# Patient Record
Sex: FEMALE | Race: WHITE | Employment: OTHER | URBAN - METROPOLITAN AREA
[De-identification: names, ages, dates, MRNs, and addresses within clinical notes are randomized per-mention and may not be internally consistent; named-entity substitution may affect disease eponyms.]

---

## 2017-02-08 PROBLEM — R19.7 DIARRHEA: Status: ACTIVE | Noted: 2017-02-08

## 2017-02-08 PROBLEM — K21.9 CHRONIC GERD: Status: ACTIVE | Noted: 2017-02-08

## 2017-06-21 PROBLEM — R19.7 DIARRHEA: Status: ACTIVE | Noted: 2017-06-21

## 2018-03-11 ENCOUNTER — OFFICE VISIT (OUTPATIENT)
Dept: FAMILY MEDICINE CLINIC | Age: 58
End: 2018-03-11
Payer: COMMERCIAL

## 2018-03-11 VITALS
HEART RATE: 63 BPM | TEMPERATURE: 98.1 F | DIASTOLIC BLOOD PRESSURE: 82 MMHG | SYSTOLIC BLOOD PRESSURE: 144 MMHG | BODY MASS INDEX: 26.22 KG/M2 | HEIGHT: 63 IN | WEIGHT: 148 LBS

## 2018-03-11 DIAGNOSIS — J32.9 SINOBRONCHITIS: Primary | ICD-10-CM

## 2018-03-11 DIAGNOSIS — J40 SINOBRONCHITIS: Primary | ICD-10-CM

## 2018-03-11 PROCEDURE — 99213 OFFICE O/P EST LOW 20 MIN: CPT | Performed by: NURSE PRACTITIONER

## 2018-03-11 RX ORDER — DOXYCYCLINE HYCLATE 100 MG
100 TABLET ORAL 2 TIMES DAILY
Qty: 20 TABLET | Refills: 0 | Status: SHIPPED | OUTPATIENT
Start: 2018-03-11 | End: 2018-03-21

## 2018-03-11 RX ORDER — PREDNISONE 20 MG/1
20 TABLET ORAL 2 TIMES DAILY
Qty: 14 TABLET | Refills: 0 | Status: SHIPPED | OUTPATIENT
Start: 2018-03-11 | End: 2018-03-18

## 2018-03-11 RX ORDER — FLUTICASONE PROPIONATE 50 MCG
1 SPRAY, SUSPENSION (ML) NASAL DAILY
Qty: 1 BOTTLE | Refills: 3 | Status: SHIPPED | OUTPATIENT
Start: 2018-03-11 | End: 2019-04-05

## 2018-04-10 RX ORDER — ATORVASTATIN CALCIUM 10 MG/1
10 TABLET, FILM COATED ORAL DAILY
Qty: 30 TABLET | Refills: 12 | Status: SHIPPED | OUTPATIENT
Start: 2018-04-10 | End: 2019-04-05 | Stop reason: SDUPTHER

## 2018-04-10 RX ORDER — HYDROCHLOROTHIAZIDE 12.5 MG/1
CAPSULE, GELATIN COATED ORAL
Qty: 30 CAPSULE | Refills: 11 | Status: SHIPPED | OUTPATIENT
Start: 2018-04-10 | End: 2019-04-05 | Stop reason: SDUPTHER

## 2018-04-17 ENCOUNTER — HOSPITAL ENCOUNTER (OUTPATIENT)
Dept: GENERAL RADIOLOGY | Age: 58
Discharge: HOME | End: 2018-04-19
Payer: COMMERCIAL

## 2018-04-17 ENCOUNTER — OFFICE VISIT (OUTPATIENT)
Dept: FAMILY MEDICINE CLINIC | Age: 58
End: 2018-04-17
Payer: COMMERCIAL

## 2018-04-17 ENCOUNTER — HOSPITAL ENCOUNTER (OUTPATIENT)
Age: 58
Discharge: HOME | End: 2018-04-19
Payer: COMMERCIAL

## 2018-04-17 VITALS
OXYGEN SATURATION: 97 % | TEMPERATURE: 98.5 F | HEIGHT: 63 IN | WEIGHT: 150 LBS | HEART RATE: 64 BPM | BODY MASS INDEX: 26.58 KG/M2 | RESPIRATION RATE: 16 BRPM | DIASTOLIC BLOOD PRESSURE: 80 MMHG | SYSTOLIC BLOOD PRESSURE: 124 MMHG

## 2018-04-17 DIAGNOSIS — J20.9 ACUTE BRONCHITIS, UNSPECIFIED ORGANISM: Primary | ICD-10-CM

## 2018-04-17 PROCEDURE — 4004F PT TOBACCO SCREEN RCVD TLK: CPT | Performed by: PHYSICIAN ASSISTANT

## 2018-04-17 PROCEDURE — 3014F SCREEN MAMMO DOC REV: CPT | Performed by: PHYSICIAN ASSISTANT

## 2018-04-17 PROCEDURE — G8419 CALC BMI OUT NRM PARAM NOF/U: HCPCS | Performed by: PHYSICIAN ASSISTANT

## 2018-04-17 PROCEDURE — G8427 DOCREV CUR MEDS BY ELIG CLIN: HCPCS | Performed by: PHYSICIAN ASSISTANT

## 2018-04-17 PROCEDURE — 71046 X-RAY EXAM CHEST 2 VIEWS: CPT

## 2018-04-17 PROCEDURE — 3017F COLORECTAL CA SCREEN DOC REV: CPT | Performed by: PHYSICIAN ASSISTANT

## 2018-04-17 PROCEDURE — 94640 AIRWAY INHALATION TREATMENT: CPT | Performed by: PHYSICIAN ASSISTANT

## 2018-04-17 PROCEDURE — 99213 OFFICE O/P EST LOW 20 MIN: CPT | Performed by: PHYSICIAN ASSISTANT

## 2018-04-17 RX ORDER — DOXYCYCLINE HYCLATE 100 MG
100 TABLET ORAL 2 TIMES DAILY
Qty: 20 TABLET | Refills: 0 | Status: SHIPPED | OUTPATIENT
Start: 2018-04-17 | End: 2018-04-27

## 2018-04-17 RX ORDER — PREDNISONE 10 MG/1
TABLET ORAL
Qty: 20 TABLET | Refills: 0 | Status: SHIPPED | OUTPATIENT
Start: 2018-04-17 | End: 2018-04-27

## 2018-04-17 RX ORDER — DEXAMETHASONE SODIUM PHOSPHATE 100 MG/10ML
10 INJECTION INTRAMUSCULAR; INTRAVENOUS ONCE
Status: COMPLETED | OUTPATIENT
Start: 2018-04-17 | End: 2018-04-17

## 2018-04-17 RX ORDER — ALBUTEROL SULFATE 90 MCG
2 HFA AEROSOL WITH ADAPTER (GRAM) INHALATION EVERY 6 HOURS PRN
Qty: 1 INHALER | Refills: 0 | Status: SHIPPED | OUTPATIENT
Start: 2018-04-17 | End: 2019-04-05

## 2018-04-17 RX ORDER — IPRATROPIUM BROMIDE AND ALBUTEROL SULFATE 2.5; .5 MG/3ML; MG/3ML
1 SOLUTION RESPIRATORY (INHALATION) ONCE
Status: COMPLETED | OUTPATIENT
Start: 2018-04-17 | End: 2018-04-17

## 2018-04-17 RX ORDER — BROMPHENIRAMINE MALEATE, PSEUDOEPHEDRINE HYDROCHLORIDE, AND DEXTROMETHORPHAN HYDROBROMIDE 2; 30; 10 MG/5ML; MG/5ML; MG/5ML
5 SYRUP ORAL 4 TIMES DAILY PRN
Qty: 100 ML | Refills: 0 | Status: SHIPPED | OUTPATIENT
Start: 2018-04-17 | End: 2019-04-05 | Stop reason: ALTCHOICE

## 2018-04-17 RX ORDER — AZITHROMYCIN 250 MG/1
TABLET, FILM COATED ORAL
Qty: 1 PACKET | Refills: 0 | Status: CANCELLED | OUTPATIENT
Start: 2018-04-17 | End: 2018-04-27

## 2018-04-17 RX ADMIN — DEXAMETHASONE SODIUM PHOSPHATE 10 MG: 100 INJECTION INTRAMUSCULAR; INTRAVENOUS at 17:11

## 2018-04-17 RX ADMIN — IPRATROPIUM BROMIDE AND ALBUTEROL SULFATE 1 AMPULE: 2.5; .5 SOLUTION RESPIRATORY (INHALATION) at 17:12

## 2018-09-04 RX ORDER — OMEPRAZOLE 40 MG/1
CAPSULE, DELAYED RELEASE ORAL
Qty: 30 CAPSULE | Refills: 5 | Status: SHIPPED | OUTPATIENT
Start: 2018-09-04 | End: 2019-03-07 | Stop reason: SDUPTHER

## 2019-02-06 ENCOUNTER — TELEPHONE (OUTPATIENT)
Dept: FAMILY MEDICINE CLINIC | Age: 59
End: 2019-02-06

## 2019-02-07 ENCOUNTER — TELEPHONE (OUTPATIENT)
Dept: ADMINISTRATIVE | Age: 59
End: 2019-02-07

## 2019-03-07 RX ORDER — OMEPRAZOLE 40 MG/1
CAPSULE, DELAYED RELEASE ORAL
Qty: 30 CAPSULE | Refills: 5 | Status: SHIPPED | OUTPATIENT
Start: 2019-03-07 | End: 2019-04-05 | Stop reason: SDUPTHER

## 2019-04-05 ENCOUNTER — OFFICE VISIT (OUTPATIENT)
Dept: FAMILY MEDICINE CLINIC | Age: 59
End: 2019-04-05
Payer: COMMERCIAL

## 2019-04-05 ENCOUNTER — HOSPITAL ENCOUNTER (OUTPATIENT)
Age: 59
Discharge: HOME | End: 2019-04-07
Payer: COMMERCIAL

## 2019-04-05 VITALS
OXYGEN SATURATION: 96 % | HEIGHT: 63 IN | SYSTOLIC BLOOD PRESSURE: 126 MMHG | WEIGHT: 158 LBS | RESPIRATION RATE: 18 BRPM | BODY MASS INDEX: 28 KG/M2 | DIASTOLIC BLOOD PRESSURE: 75 MMHG | HEART RATE: 67 BPM | TEMPERATURE: 99 F

## 2019-04-05 DIAGNOSIS — F33.9 RECURRENT MAJOR DEPRESSIVE DISORDER, REMISSION STATUS UNSPECIFIED (HCC): ICD-10-CM

## 2019-04-05 DIAGNOSIS — Z13.31 POSITIVE DEPRESSION SCREENING: ICD-10-CM

## 2019-04-05 DIAGNOSIS — E78.2 MIXED HYPERLIPIDEMIA: ICD-10-CM

## 2019-04-05 DIAGNOSIS — M25.511 CHRONIC RIGHT SHOULDER PAIN: ICD-10-CM

## 2019-04-05 DIAGNOSIS — I10 ESSENTIAL HYPERTENSION: ICD-10-CM

## 2019-04-05 DIAGNOSIS — G89.29 CHRONIC RIGHT SHOULDER PAIN: ICD-10-CM

## 2019-04-05 DIAGNOSIS — K21.9 CHRONIC GERD: ICD-10-CM

## 2019-04-05 PROCEDURE — G8419 CALC BMI OUT NRM PARAM NOF/U: HCPCS | Performed by: STUDENT IN AN ORGANIZED HEALTH CARE EDUCATION/TRAINING PROGRAM

## 2019-04-05 PROCEDURE — 4004F PT TOBACCO SCREEN RCVD TLK: CPT | Performed by: STUDENT IN AN ORGANIZED HEALTH CARE EDUCATION/TRAINING PROGRAM

## 2019-04-05 PROCEDURE — 3017F COLORECTAL CA SCREEN DOC REV: CPT | Performed by: STUDENT IN AN ORGANIZED HEALTH CARE EDUCATION/TRAINING PROGRAM

## 2019-04-05 PROCEDURE — 36415 COLL VENOUS BLD VENIPUNCTURE: CPT | Performed by: FAMILY MEDICINE

## 2019-04-05 PROCEDURE — G8431 POS CLIN DEPRES SCRN F/U DOC: HCPCS | Performed by: STUDENT IN AN ORGANIZED HEALTH CARE EDUCATION/TRAINING PROGRAM

## 2019-04-05 PROCEDURE — G8427 DOCREV CUR MEDS BY ELIG CLIN: HCPCS | Performed by: STUDENT IN AN ORGANIZED HEALTH CARE EDUCATION/TRAINING PROGRAM

## 2019-04-05 PROCEDURE — 99213 OFFICE O/P EST LOW 20 MIN: CPT | Performed by: STUDENT IN AN ORGANIZED HEALTH CARE EDUCATION/TRAINING PROGRAM

## 2019-04-05 PROCEDURE — 80053 COMPREHEN METABOLIC PANEL: CPT

## 2019-04-05 PROCEDURE — 99212 OFFICE O/P EST SF 10 MIN: CPT | Performed by: STUDENT IN AN ORGANIZED HEALTH CARE EDUCATION/TRAINING PROGRAM

## 2019-04-05 PROCEDURE — 36415 COLL VENOUS BLD VENIPUNCTURE: CPT

## 2019-04-05 RX ORDER — RANITIDINE HCL 75 MG
75 TABLET ORAL 2 TIMES DAILY
Qty: 60 TABLET | Refills: 3 | Status: SHIPPED | OUTPATIENT
Start: 2019-04-05 | End: 2019-09-16 | Stop reason: SDUPTHER

## 2019-04-05 RX ORDER — HYDROCHLOROTHIAZIDE 12.5 MG/1
CAPSULE, GELATIN COATED ORAL
Qty: 30 CAPSULE | Refills: 11 | Status: SHIPPED | OUTPATIENT
Start: 2019-04-05 | End: 2019-09-16 | Stop reason: SDUPTHER

## 2019-04-05 RX ORDER — OMEPRAZOLE 40 MG/1
40 CAPSULE, DELAYED RELEASE ORAL DAILY
Qty: 30 CAPSULE | Refills: 5 | Status: SHIPPED | OUTPATIENT
Start: 2019-04-05 | End: 2019-04-09

## 2019-04-05 RX ORDER — IBUPROFEN 800 MG/1
800 TABLET ORAL EVERY 6 HOURS PRN
Qty: 90 TABLET | Refills: 3 | Status: SHIPPED | OUTPATIENT
Start: 2019-04-05 | End: 2019-09-16 | Stop reason: ALTCHOICE

## 2019-04-05 RX ORDER — ATORVASTATIN CALCIUM 10 MG/1
10 TABLET, FILM COATED ORAL DAILY
Qty: 30 TABLET | Refills: 12 | Status: SHIPPED | OUTPATIENT
Start: 2019-04-05 | End: 2019-09-16 | Stop reason: SDUPTHER

## 2019-04-05 RX ORDER — SERTRALINE HYDROCHLORIDE 100 MG/1
TABLET, FILM COATED ORAL
Qty: 30 TABLET | Refills: 3 | Status: SHIPPED | OUTPATIENT
Start: 2019-04-05 | End: 2019-09-16 | Stop reason: SDUPTHER

## 2019-04-05 ASSESSMENT — PATIENT HEALTH QUESTIONNAIRE - PHQ9
3. TROUBLE FALLING OR STAYING ASLEEP: 0
10. IF YOU CHECKED OFF ANY PROBLEMS, HOW DIFFICULT HAVE THESE PROBLEMS MADE IT FOR YOU TO DO YOUR WORK, TAKE CARE OF THINGS AT HOME, OR GET ALONG WITH OTHER PEOPLE: 2
4. FEELING TIRED OR HAVING LITTLE ENERGY: 3
1. LITTLE INTEREST OR PLEASURE IN DOING THINGS: 3
5. POOR APPETITE OR OVEREATING: 3
SUM OF ALL RESPONSES TO PHQ9 QUESTIONS 1 & 2: 4
9. THOUGHTS THAT YOU WOULD BE BETTER OFF DEAD, OR OF HURTING YOURSELF: 0
6. FEELING BAD ABOUT YOURSELF - OR THAT YOU ARE A FAILURE OR HAVE LET YOURSELF OR YOUR FAMILY DOWN: 0
2. FEELING DOWN, DEPRESSED OR HOPELESS: 1
8. MOVING OR SPEAKING SO SLOWLY THAT OTHER PEOPLE COULD HAVE NOTICED. OR THE OPPOSITE, BEING SO FIGETY OR RESTLESS THAT YOU HAVE BEEN MOVING AROUND A LOT MORE THAN USUAL: 1
SUM OF ALL RESPONSES TO PHQ QUESTIONS 1-9: 12
7. TROUBLE CONCENTRATING ON THINGS, SUCH AS READING THE NEWSPAPER OR WATCHING TELEVISION: 1
SUM OF ALL RESPONSES TO PHQ QUESTIONS 1-9: 12

## 2019-04-05 NOTE — PROGRESS NOTES
Chief Complaint: Chronic right shoulder pain    HPI: Chronic right shoulder pain for many years. Centered right below right scapular spine, seems to come and go. Radiates into neck and shoulder at times. Not taking much for the pain.     Past Medical History:   Diagnosis Date    [facility]coholic ([facility]     Anxiety     Convulsions/seizures ([facility] 9/16/2011    Depression     GERD (gastroesophageal reflux disease)     Headache     HTN (hypertension) 6/16/2015    Mixed hyperlipidemia 2/17/2016    Osteoarthritis     Psychiatric problem     [facility]     methadone       Past Surgical History:   Procedure Laterality Date    BREAST SURGERY  1988    augmentation silicone    ECTOPIC PREGNANCY SURGERY      TUBAL LIGATION  01/01/1993       Family History   Problem Relation Age of Onset    Diabetes Mother     Kidney Disease Mother     Stroke Mother     High Cholesterol Mother     Arthritis Mother     High Blood Pressure Mother     Obesity Mother     Diabetes Father     Arthritis Father     High Blood Pressure Father     High Cholesterol Father     Obesity Father     Other Father     Substance Abuse Son        Social History     Socioeconomic History    Marital status:      Spouse name: Not on file    Number of children: Not on file    Years of education: Not on file    Highest education level: Not on file   Occupational History    Not on file   Social Needs    Financial resource strain: Not on file    Food insecurity:     Worry: Not on file     Inability: Not on file    Transportation needs:     Medical: Not on file     Non-medical: Not on file   Tobacco Use    Smoking status: Current Every Day Smoker     Packs/day: 0.50     Years: 15.00     Pack years: 7.50     Types: Cigarettes    Smokeless tobacco: Never Used   Substance and Sexual Activity    Alcohol use: No     Alcohol/week: 0.0 oz     Comment: recovering alcoholic    Drug use: No     Comment: recovering addict    Sexual activity: Not Currently   Lifestyle    Physical activity:     Days per week: Not on file     Minutes per session: Not on file    Stress: Not on file   Relationships    Social connections:     Talks on phone: Not on file     Gets together: Not on file     Attends Muslim service: Not on file     Active member of club or organization: Not on file     Attends meetings of clubs or organizations: Not on file     Relationship status: Not on file    Intimate partner violence:     Fear of current or ex partner: Not on file     Emotionally abused: Not on file     Physically abused: Not on file     Forced sexual activity: Not on file   Other Topics Concern    Not on file   Social History Narrative    Not on file       Current Outpatient Medications   Medication Sig Dispense Refill    omeprazole (PRILOSEC) 40 MG delayed release capsule TAKE ONE CAPSULE BY MOUTH DAILY WITH BREAKFAST 30 capsule 5    sertraline (ZOLOFT) 100 MG tablet TAKE 1 TABLET BY MOUTH ONCE EVERY DAY 30 tablet 7    atorvastatin (LIPITOR) 10 MG tablet Take 1 tablet by mouth daily 30 tablet 12    hydrochlorothiazide (MICROZIDE) 12.5 MG capsule TAKE ONE CAPSULE BY MOUTH ONCE A  DAY 30 capsule 11    methadone (DOLOPHINE) 10 MG/ML solution Take 80 mg by mouth daily       ibuprofen (ADVIL;MOTRIN) 800 MG tablet Take 1 tablet by mouth every 6 hours as needed for Pain 90 tablet 11     No current facility-administered medications for this visit. Allergies: Patient has no known allergies. Review of Systems   Constitutional: Positive for fever (intermittent, mild). Negative for chills, diaphoresis, fatigue and unexpected weight change. Musculoskeletal: Positive for arthralgias. Negative for back pain, gait problem, joint swelling, myalgias, neck pain and neck stiffness. Skin: Negative for rash.        /75 (Site: Right Upper Arm, Position: Sitting, Cuff Size: Medium Adult)   Pulse 67   Temp 99 °F (37.2 °C) (Oral)   Resp 18   Ht 5' 3\" (1.6 m)   Wt 158 lb (71.7 kg)   LMP 09/08/2011   SpO2 96%   BMI 27.99 kg/m²     Physical Exam   Constitutional: She appears well-developed and well-nourished. No distress. Cardiovascular: Normal rate, regular rhythm, normal heart sounds and intact distal pulses. Exam reveals no gallop and no friction rub. No murmur heard. Pulmonary/Chest: Effort normal and breath sounds normal. No stridor. She has no wheezes. Musculoskeletal:   Point area of tenderness and muscle spasm just below medial portion of right scapular spine. No cervical spinal tenderness. Full range of motion right shoulder. Neer's and Pauline Maddi' tests negative. No pain to palpation of right shoulder joint. Skin: She is not diaphoretic. Assessment and Plan:  Saray Schulz was seen today for shoulder pain. Diagnoses and all orders for this visit:    Chronic right shoulder pain  Appears related to trigger point below right scapular spine. Will trial NSAID therapy and heat to area, may consider trigger point injection in future if needed. -     ibuprofen (ADVIL;MOTRIN) 800 MG tablet; Take 1 tablet by mouth every 6 hours as needed for Pain    Mixed hyperlipidemia  Stable, continue present management. -     atorvastatin (LIPITOR) 10 MG tablet; Take 1 tablet by mouth daily    Essential hypertension  Stable, continue present management. Will get CMP for medication monitoring.  -     hydrochlorothiazide (MICROZIDE) 12.5 MG capsule; TAKE ONE CAPSULE BY MOUTH ONCE A  DAY  -     Comprehensive Metabolic Panel; Future    Positive depression screening  Patient already on therapy, patient believes this is helping tremendously, does not feel need to adjust at this time. Will monitor, repeat depression screen at next visit.   -     sertraline (ZOLOFT) 100 MG tablet; TAKE 1 TABLET BY MOUTH ONCE EVERY DAY  -     Positive Screen for Clinical Depression with a Documented Follow-up Plan     Recurrent major depressive disorder, remission status

## 2019-04-06 LAB
ALBUMIN SERPL-MCNC: 4.1 G/DL (ref 3.5–5.2)
ALP BLD-CCNC: 78 U/L (ref 35–104)
ALT SERPL-CCNC: 18 U/L (ref 0–32)
ANION GAP SERPL CALCULATED.3IONS-SCNC: 13 MMOL/L (ref 7–16)
AST SERPL-CCNC: 20 U/L (ref 0–31)
BILIRUB SERPL-MCNC: <0.2 MG/DL (ref 0–1.2)
BUN BLDV-MCNC: 9 MG/DL (ref 6–20)
CALCIUM SERPL-MCNC: 8.9 MG/DL (ref 8.6–10.2)
CHLORIDE BLD-SCNC: 100 MMOL/L (ref 98–107)
CO2: 28 MMOL/L (ref 22–29)
CREAT SERPL-MCNC: 0.7 MG/DL (ref 0.5–1)
GFR AFRICAN AMERICAN: >60
GFR NON-AFRICAN AMERICAN: >60 ML/MIN/1.73
GLUCOSE BLD-MCNC: 84 MG/DL (ref 74–99)
POTASSIUM SERPL-SCNC: 3.9 MMOL/L (ref 3.5–5)
SODIUM BLD-SCNC: 141 MMOL/L (ref 132–146)
TOTAL PROTEIN: 7.1 G/DL (ref 6.4–8.3)

## 2019-04-09 ENCOUNTER — HOSPITAL ENCOUNTER (EMERGENCY)
Age: 59
Discharge: HOME | End: 2019-04-09
Attending: EMERGENCY MEDICINE
Payer: COMMERCIAL

## 2019-04-09 ENCOUNTER — APPOINTMENT (OUTPATIENT)
Dept: GENERAL RADIOLOGY | Age: 59
End: 2019-04-09
Payer: COMMERCIAL

## 2019-04-09 VITALS
HEART RATE: 67 BPM | DIASTOLIC BLOOD PRESSURE: 66 MMHG | OXYGEN SATURATION: 99 % | WEIGHT: 150 LBS | BODY MASS INDEX: 26.58 KG/M2 | SYSTOLIC BLOOD PRESSURE: 116 MMHG | TEMPERATURE: 98.9 F | RESPIRATION RATE: 16 BRPM | HEIGHT: 63 IN

## 2019-04-09 DIAGNOSIS — R50.9 FUO (FEVER OF UNKNOWN ORIGIN): Primary | ICD-10-CM

## 2019-04-09 LAB
ALBUMIN SERPL-MCNC: 4 G/DL (ref 3.5–5.2)
ALP BLD-CCNC: 77 U/L (ref 35–104)
ALT SERPL-CCNC: 9 U/L (ref 0–32)
ANION GAP SERPL CALCULATED.3IONS-SCNC: 16 MMOL/L (ref 7–16)
AST SERPL-CCNC: 19 U/L (ref 0–31)
BASOPHILS ABSOLUTE: 0.02 E9/L (ref 0–0.2)
BASOPHILS RELATIVE PERCENT: 0.4 % (ref 0–2)
BILIRUB SERPL-MCNC: <0.2 MG/DL (ref 0–1.2)
BILIRUBIN URINE: NEGATIVE
BLOOD, URINE: NEGATIVE
BUN BLDV-MCNC: 6 MG/DL (ref 6–20)
CALCIUM SERPL-MCNC: 8.4 MG/DL (ref 8.6–10.2)
CHLORIDE BLD-SCNC: 100 MMOL/L (ref 98–107)
CLARITY: CLEAR
CO2: 24 MMOL/L (ref 22–29)
COLOR: NORMAL
CREAT SERPL-MCNC: 0.7 MG/DL (ref 0.5–1)
EOSINOPHILS ABSOLUTE: 0.06 E9/L (ref 0.05–0.5)
EOSINOPHILS RELATIVE PERCENT: 1.2 % (ref 0–6)
GFR AFRICAN AMERICAN: >60
GFR NON-AFRICAN AMERICAN: >60 ML/MIN/1.73
GLUCOSE BLD-MCNC: 98 MG/DL (ref 74–99)
GLUCOSE URINE: NEGATIVE MG/DL
HCT VFR BLD CALC: 38.6 % (ref 34–48)
HEMOGLOBIN: 12.8 G/DL (ref 11.5–15.5)
IMMATURE GRANULOCYTES #: 0.04 E9/L
IMMATURE GRANULOCYTES %: 0.8 % (ref 0–5)
KETONES, URINE: NEGATIVE MG/DL
LEUKOCYTE ESTERASE, URINE: NEGATIVE
LYMPHOCYTES ABSOLUTE: 1.4 E9/L (ref 1.5–4)
LYMPHOCYTES RELATIVE PERCENT: 28.1 % (ref 20–42)
MCH RBC QN AUTO: 33.1 PG (ref 26–35)
MCHC RBC AUTO-ENTMCNC: 33.2 % (ref 32–34.5)
MCV RBC AUTO: 99.7 FL (ref 80–99.9)
MONOCYTES ABSOLUTE: 0.55 E9/L (ref 0.1–0.95)
MONOCYTES RELATIVE PERCENT: 11 % (ref 2–12)
NEUTROPHILS ABSOLUTE: 2.92 E9/L (ref 1.8–7.3)
NEUTROPHILS RELATIVE PERCENT: 58.5 % (ref 43–80)
NITRITE, URINE: NEGATIVE
PDW BLD-RTO: 11.9 FL (ref 11.5–15)
PH UA: 6 (ref 5–9)
PLATELET # BLD: 219 E9/L (ref 130–450)
PMV BLD AUTO: 9.5 FL (ref 7–12)
POTASSIUM SERPL-SCNC: 3.3 MMOL/L (ref 3.5–5)
PROTEIN UA: NEGATIVE MG/DL
RBC # BLD: 3.87 E12/L (ref 3.5–5.5)
SEDIMENTATION RATE, ERYTHROCYTE: 46 MM/HR (ref 0–20)
SODIUM BLD-SCNC: 140 MMOL/L (ref 132–146)
SPECIFIC GRAVITY UA: <=1.005 (ref 1–1.03)
TOTAL PROTEIN: 7.2 G/DL (ref 6.4–8.3)
UROBILINOGEN, URINE: 0.2 E.U./DL
WBC # BLD: 5 E9/L (ref 4.5–11.5)

## 2019-04-09 PROCEDURE — 36415 COLL VENOUS BLD VENIPUNCTURE: CPT

## 2019-04-09 PROCEDURE — 80053 COMPREHEN METABOLIC PANEL: CPT

## 2019-04-09 PROCEDURE — 85025 COMPLETE CBC W/AUTO DIFF WBC: CPT

## 2019-04-09 PROCEDURE — 85651 RBC SED RATE NONAUTOMATED: CPT

## 2019-04-09 PROCEDURE — 87088 URINE BACTERIA CULTURE: CPT

## 2019-04-09 PROCEDURE — 86140 C-REACTIVE PROTEIN: CPT

## 2019-04-09 PROCEDURE — 87040 BLOOD CULTURE FOR BACTERIA: CPT

## 2019-04-09 PROCEDURE — 99283 EMERGENCY DEPT VISIT LOW MDM: CPT

## 2019-04-09 PROCEDURE — 81003 URINALYSIS AUTO W/O SCOPE: CPT

## 2019-04-09 PROCEDURE — 71046 X-RAY EXAM CHEST 2 VIEWS: CPT

## 2019-04-09 NOTE — ED PROVIDER NOTES
Patient was taken over from Dr. Sung Grace. Discussed results with the patient. CXR was ordered. Discussed results with the patient. Patient will be discharged home and is to follow up with her PCP    4/9/19, 7:52 PM.    This note is prepared by Paul Rossi, acting as Scribe for Fidel Ross DO. Fidel Ross DO:  The scribe's documentation has been prepared under my direction and personally reviewed by me in its entirety. I confirm that the note above accurately reflects all work, treatment, procedures, and medical decision making performed by me.

## 2019-04-09 NOTE — ED PROVIDER NOTES
HPI:  4/9/19,   Time: 5:26 PM         Celia Rey is a 63 y.o. female presenting to the ED for a persistent fever, beginning over one month ago. The complaint has been intermittent, moderate in severity, and worsened by nothing. The patient has had a chronic smoker's cough but she states it is nonproductive and she denies nausea or vomiting or urinary symptoms. She does have diarrhea but she states it is chronic from her Crohn's disease. She complains of generalized weakness . There has been no headache or rash. She saw her physician 2 days ago and had basic labs drawn which showed no leukocytosis but she is noticed to have chronic macrocytic indices on her red blood cell count. The patient has a history of alcoholism but she denies any history of IV drug abuse and she has not used alcohol for the last 2 years    ROS:   Pertinent positives and negatives are stated within HPI, all other systems reviewed and are negative.  --------------------------------------------- PAST HISTORY ---------------------------------------------  Past Medical History:  has a past medical history of Alcoholic ([facility] Anxiety, [facility] Depression, GERD (gastroesophageal reflux disease), Headache, HTN (hypertension), Mixed hyperlipidemia, Osteoarthritis, Psychiatric problem, and [facility] Past Surgical History:  has a past surgical history that includes Ectopic pregnancy surgery; Breast surgery (1988); and Tubal ligation (01/01/1993). Social History:  reports that she has been smoking cigarettes. She has a 7.50 pack-year smoking history. She has never used smokeless tobacco. She reports that she does not drink alcohol or use drugs.     Family History: family history includes Arthritis in her father and mother; Diabetes in her father and mother; High Blood Pressure in her father and mother; High Cholesterol in her father and mother; Kidney Disease in her mother; Obesity in her father and mother; Other in her father; Stroke in her mother; Substance Abuse in her son. The patients home medications have been reviewed. Allergies: Patient has no known allergies. -------------------------------------------------- RESULTS -------------------------------------------------  All laboratory and radiology results have been personally reviewed by myself   LABS:  No results found for this visit on 19. RADIOLOGY:  Interpreted by Radiologist.  No orders to display       ------------------------- NURSING NOTES AND VITALS REVIEWED ---------------------------   The nursing notes within the ED encounter and vital signs as below have been reviewed. /82   Pulse 80   Temp 98.4 °F (36.9 °C) (Oral)   Resp 16   Ht 5' 3\" (1.6 m)   Wt 150 lb (68 kg)   LMP 2011   SpO2 95%   BMI 26.57 kg/m²   Oxygen Saturation Interpretation: Normal      ---------------------------------------------------PHYSICAL EXAM--------------------------------------      Constitutional/General: Alert and oriented x3, well appearing, non toxic in NAD  Head: NC/AT  Eyes: PERRL, EOMI  Mouth: Oropharynx clear, handling secretions, no trismus  Neck: Supple, full ROM, no meningeal signs  Pulmonary: Lungs clear to auscultation bilaterally, no wheezes, rales, or rhonchi. Not in respiratory distress  Cardiovascular:  Regular rate and rhythm, no murmurs, gallops, or rubs. 2+ distal pulses  Abdomen: Soft, non tender, non distended,   Extremities: Moves all extremities x 4. Warm and well perfused  Skin: warm and dry without rash  Neurologic: GCS 15,  Psych: Normal Affect      ------------------------------ ED COURSE/MEDICAL DECISION MAKING----------------------  Medications - No data to display      Medical Decision Makin blood cultures basic labs will be repeated a chest x-ray urinalysis and urine cultures will be obtained and patient is to follow-up with family practice in 3 days' time.   A C-reactive protein and a sedimentation rate will also be obtained    Counseling: The emergency provider has spoken with the patient and family member patient and daughter and discussed todays results, in addition to providing specific details for the plan of care and counseling regarding the diagnosis and prognosis. Questions are answered at this time and they are agreeable with the plan.      --------------------------------- IMPRESSION AND DISPOSITION ---------------------------------    IMPRESSION  1. Fever, unspecified fever cause    2.  FUO (fever of unknown origin)        DISPOSITION  Disposition: Discharge to home  Patient condition is stable                  Alba Haddad MD  04/12/19 0081

## 2019-04-10 LAB — C-REACTIVE PROTEIN: 5.8 MG/DL (ref 0–0.4)

## 2019-04-12 ENCOUNTER — OFFICE VISIT (OUTPATIENT)
Dept: FAMILY MEDICINE CLINIC | Age: 59
End: 2019-04-12
Payer: COMMERCIAL

## 2019-04-12 VITALS
DIASTOLIC BLOOD PRESSURE: 73 MMHG | OXYGEN SATURATION: 96 % | TEMPERATURE: 98.4 F | HEART RATE: 65 BPM | SYSTOLIC BLOOD PRESSURE: 124 MMHG | BODY MASS INDEX: 27.64 KG/M2 | HEIGHT: 63 IN | WEIGHT: 156 LBS

## 2019-04-12 DIAGNOSIS — R50.9 INTERMITTENT FEVER OF UNKNOWN ORIGIN: ICD-10-CM

## 2019-04-12 DIAGNOSIS — M25.50 ARTHRALGIA OF MULTIPLE SITES, BILATERAL: ICD-10-CM

## 2019-04-12 DIAGNOSIS — R79.82 ELEVATED C-REACTIVE PROTEIN (CRP): ICD-10-CM

## 2019-04-12 DIAGNOSIS — R70.0 ELEVATED ERYTHROCYTE SEDIMENTATION RATE: ICD-10-CM

## 2019-04-12 LAB — URINE CULTURE, ROUTINE: NORMAL

## 2019-04-12 PROCEDURE — 4004F PT TOBACCO SCREEN RCVD TLK: CPT | Performed by: STUDENT IN AN ORGANIZED HEALTH CARE EDUCATION/TRAINING PROGRAM

## 2019-04-12 PROCEDURE — G8427 DOCREV CUR MEDS BY ELIG CLIN: HCPCS | Performed by: STUDENT IN AN ORGANIZED HEALTH CARE EDUCATION/TRAINING PROGRAM

## 2019-04-12 PROCEDURE — 3017F COLORECTAL CA SCREEN DOC REV: CPT | Performed by: STUDENT IN AN ORGANIZED HEALTH CARE EDUCATION/TRAINING PROGRAM

## 2019-04-12 PROCEDURE — 99212 OFFICE O/P EST SF 10 MIN: CPT | Performed by: STUDENT IN AN ORGANIZED HEALTH CARE EDUCATION/TRAINING PROGRAM

## 2019-04-12 PROCEDURE — 36415 COLL VENOUS BLD VENIPUNCTURE: CPT | Performed by: FAMILY MEDICINE

## 2019-04-12 PROCEDURE — G8419 CALC BMI OUT NRM PARAM NOF/U: HCPCS | Performed by: STUDENT IN AN ORGANIZED HEALTH CARE EDUCATION/TRAINING PROGRAM

## 2019-04-12 PROCEDURE — 99213 OFFICE O/P EST LOW 20 MIN: CPT | Performed by: STUDENT IN AN ORGANIZED HEALTH CARE EDUCATION/TRAINING PROGRAM

## 2019-04-12 ASSESSMENT — ENCOUNTER SYMPTOMS: BACK PAIN: 0

## 2019-04-12 NOTE — PROGRESS NOTES
Attending Physician Statement    S:   Chief Complaint   Patient presents with    Follow-Up from Hospital      Patient is a 63year old female here for ER follow up . Was in the ER for intermittent fevers of 102 for 5- 10 min. Patient has had this on and off for months. CBC was checked, urine , chest xray , blood cultures. All were negative. Patient did had elevated CRP and ESR. Joint pain is worse in the morning. Lasts for a while but gets better throughout the day. Primarily in knee, elbows , shoulders. Denies any hip pain. Last fever was Fri/Saturday. No rashes. O: Blood pressure 124/73, pulse 65, temperature 98.4 °F (36.9 °C), temperature source Oral, height 5' 3\" (1.6 m), weight 156 lb (70.8 kg), last menstrual period 09/08/2011, SpO2 96 %, not currently breastfeeding. Exam:   Heart - RRR   Lungs - clear   Abd- BS+, soft, NT/ND  A: intermittent fever , joint pain   P:  No signs of infection. Concern for possible vasculitis, autoimmune disorder such as lupus or rheumatoid arthritis , or less likely hematologic malignancy    Check labs - ADONIS, RF, peripheral smear, SPEP  May consider CT chest ,abdomen, pelvis    Check mammogram    Follow-up as ordered    Attending Attestation   I have discussed the case, including pertinent history and exam findings with the resident. I agree with the documented assessment and plan.

## 2019-04-12 NOTE — PROGRESS NOTES
Chief Complaint: ED Follow up fevers    HPI: Recurrent fevers of unknown origin, ED follow up. Patient states that she has periodic fevers up to 102, lasting 5 to 10 minutes at a time, which have been intermittently present for several months. Lab work in ED was negative, but patient did have elevated CRP and ESR. Continues to have joint pains as described at her recent office visit, worse in mornings, goes away as day goes on, predominantly in knees, elbows, and shoulders without hip, ankle, or hand pain. Denies rashes.     Past Medical History:   Diagnosis Date    [facility] ([facility]     Anxiety     Convulsions[facility]seizures ([facility] 9/16/2011    Depression     GERD (gastroesophageal reflux disease)     Headache     HTN (hypertension) 6/16/2015    Mixed hyperlipidemia 2/17/2016    Osteoarthritis     Psychiatric problem     [facility]     [facility]adone       Past Surgical History:   Procedure Laterality Date    BREAST SURGERY  1988    augmentation silicone    ECTOPIC PREGNANCY SURGERY      TUBAL LIGATION  01/01/1993       Family History   Problem Relation Age of Onset    Diabetes Mother     Kidney Disease Mother     Stroke Mother     High Cholesterol Mother     Arthritis Mother     High Blood Pressure Mother     Obesity Mother     Diabetes Father     Arthritis Father     High Blood Pressure Father     High Cholesterol Father     Obesity Father     Other Father     Substance Abuse Son        Social History     Socioeconomic History    Marital status:      Spouse name: Not on file    Number of children: Not on file    Years of education: Not on file    Highest education level: Not on file   Occupational History    Not on file   Social Needs    Financial resource strain: Not on file    Food insecurity:     Worry: Not on file     Inability: Not on file    Transportation needs:     Medical: Not on file     Non-medical: Not on file   Tobacco Use    Smoking status: Current Every Day Smoker     Packs/day: 0.50     Years: 15.00     Pack years: 7.50     Types: Cigarettes    Smokeless tobacco: Never Used   Substance and Sexual Activity    Alcohol use: No     Alcohol/week: 0.0 oz     Comment: recovering alcoholic    Drug use: No     Comment: recovering addict    Sexual activity: Not Currently   Lifestyle    Physical activity:     Days per week: Not on file     Minutes per session: Not on file    Stress: Not on file   Relationships    Social connections:     Talks on phone: Not on file     Gets together: Not on file     Attends Voodoo service: Not on file     Active member of club or organization: Not on file     Attends meetings of clubs or organizations: Not on file     Relationship status: Not on file    Intimate partner violence:     Fear of current or ex partner: Not on file     Emotionally abused: Not on file     Physically abused: Not on file     Forced sexual activity: Not on file   Other Topics Concern    Not on file   Social History Narrative    Not on file       Current Outpatient Medications   Medication Sig Dispense Refill    sertraline (ZOLOFT) 100 MG tablet TAKE 1 TABLET BY MOUTH ONCE EVERY DAY 30 tablet 3    atorvastatin (LIPITOR) 10 MG tablet Take 1 tablet by mouth daily 30 tablet 12    hydrochlorothiazide (MICROZIDE) 12.5 MG capsule TAKE ONE CAPSULE BY MOUTH ONCE A  DAY 30 capsule 11    ibuprofen (ADVIL;MOTRIN) 800 MG tablet Take 1 tablet by mouth every 6 hours as needed for Pain 90 tablet 3    ranitidine (ZANTAC) 75 MG tablet Take 1 tablet by mouth 2 times daily 60 tablet 3    methadone (DOLOPHINE) 10 MG/ML solution Take 65 mg by mouth daily. No current facility-administered medications for this visit. Allergies: Patient has no known allergies. Review of Systems   Constitutional: Positive for diaphoresis and fever. Negative for activity change, appetite change, chills, fatigue and unexpected weight change.    Respiratory: Negative for cough and shortness of breath. Cardiovascular: Negative for chest pain and palpitations. Gastrointestinal: Negative for constipation, diarrhea, nausea and vomiting. Endocrine: Negative for cold intolerance, heat intolerance, polydipsia and polyuria. Musculoskeletal: Positive for arthralgias. Negative for joint swelling and myalgias. Skin: Negative for color change and rash. Neurological: Negative for dizziness, syncope and light-headedness. Hematological: Negative for adenopathy. Psychiatric/Behavioral: The patient is not nervous/anxious. /73 (Site: Right Upper Arm, Position: Sitting, Cuff Size: Medium Adult)   Pulse 65   Temp 98.4 °F (36.9 °C) (Oral)   Ht 5' 3\" (1.6 m)   Wt 156 lb (70.8 kg)   LMP 09/08/2011   SpO2 96%   BMI 27.63 kg/m²     Physical Exam   Constitutional: She appears well-developed and well-nourished. No distress. Cardiovascular: Normal rate, regular rhythm, normal heart sounds and intact distal pulses. Exam reveals no gallop and no friction rub. No murmur heard. Pulmonary/Chest: Effort normal and breath sounds normal. She has no wheezes. She has no rales. Abdominal: Soft. Bowel sounds are normal. She exhibits no distension and no mass. There is no tenderness. Musculoskeletal: She exhibits no edema. Skin: She is not diaphoretic. Assessment and Plan:  Jhoan Coe was seen today for follow-up from hospital.    Diagnoses and all orders for this visit:    Intermittent fever of unknown origin  -     RHEUMATOID FACTOR; Future  -     ADONIS; Future  -     PROTEIN ELECTROPHORESIS, SERUM; Future  -     PERIPHERAL BLOOD SMEAR, PATH REVIEW; Future    Arthralgia of multiple sites, bilateral  Elevated erythrocyte sedimentation rate  Elevated C-reactive protein (CRP)  Concern for rheumatologic etiology versus vasculitis versus malignancy. Will check ADONIS and RF, will obtain peripheral smear, and will obtain serum protein electrophoresis.   May consider CT of chest and abdomen if blood work negative.  -     RHEUMATOID FACTOR; Future  -     ADONIS; Future  -     PROTEIN ELECTROPHORESIS, SERUM; Future  -     PERIPHERAL BLOOD SMEAR, PATH REVIEW; Future      Call or go to ED immediately if symptoms worsen or persist.  Return in about 2 weeks (around 4/26/2019) for Exam following recurrent fevers and arthralgias. , or sooner if necessary. All questions answered. Shashank Troy MD  Family Medicine Resident, PGY-3

## 2019-04-14 ENCOUNTER — HOSPITAL ENCOUNTER (OUTPATIENT)
Age: 59
Discharge: HOME | End: 2019-04-14
Payer: COMMERCIAL

## 2019-04-14 DIAGNOSIS — R70.0 ELEVATED ERYTHROCYTE SEDIMENTATION RATE: ICD-10-CM

## 2019-04-14 DIAGNOSIS — M25.50 ARTHRALGIA OF MULTIPLE SITES, BILATERAL: ICD-10-CM

## 2019-04-14 DIAGNOSIS — R50.9 INTERMITTENT FEVER OF UNKNOWN ORIGIN: ICD-10-CM

## 2019-04-14 DIAGNOSIS — R79.82 ELEVATED C-REACTIVE PROTEIN (CRP): ICD-10-CM

## 2019-04-14 LAB — RHEUMATOID FACTOR: 10 IU/ML (ref 0–13)

## 2019-04-14 PROCEDURE — 86038 ANTINUCLEAR ANTIBODIES: CPT

## 2019-04-14 PROCEDURE — 86431 RHEUMATOID FACTOR QUANT: CPT

## 2019-04-14 PROCEDURE — 36415 COLL VENOUS BLD VENIPUNCTURE: CPT

## 2019-04-14 PROCEDURE — 84165 PROTEIN E-PHORESIS SERUM: CPT

## 2019-04-15 ENCOUNTER — HOSPITAL ENCOUNTER (EMERGENCY)
Age: 59
Discharge: HOME | End: 2019-04-15
Attending: EMERGENCY MEDICINE
Payer: COMMERCIAL

## 2019-04-15 VITALS
TEMPERATURE: 98.8 F | RESPIRATION RATE: 14 BRPM | BODY MASS INDEX: 26.58 KG/M2 | HEIGHT: 63 IN | SYSTOLIC BLOOD PRESSURE: 140 MMHG | DIASTOLIC BLOOD PRESSURE: 88 MMHG | WEIGHT: 150 LBS | OXYGEN SATURATION: 94 % | HEART RATE: 80 BPM

## 2019-04-15 DIAGNOSIS — K04.7 DENTAL ABSCESS: Primary | ICD-10-CM

## 2019-04-15 LAB
ALBUMIN SERPL-MCNC: 3.4 G/DL (ref 3.5–4.7)
ALPHA-1-GLOBULIN: 0.3 G/DL (ref 0.2–0.4)
ALPHA-2-GLOBULIN: 0.9 G/FL (ref 0.5–1)
ANTI-NUCLEAR ANTIBODY (ANA): NEGATIVE
BETA GLOBULIN: 1.1 G/DL (ref 0.8–1.3)
BLOOD CULTURE, ROUTINE: NORMAL
CULTURE, BLOOD 2: NORMAL
ELECTROPHORESIS: ABNORMAL
GAMMA GLOBULIN: 1.2 G/DL (ref 0.7–1.6)
TOTAL PROTEIN: 7 G/DL (ref 6.4–8.3)

## 2019-04-15 PROCEDURE — 96375 TX/PRO/DX INJ NEW DRUG ADDON: CPT

## 2019-04-15 PROCEDURE — 99282 EMERGENCY DEPT VISIT SF MDM: CPT

## 2019-04-15 PROCEDURE — 6360000002 HC RX W HCPCS: Performed by: EMERGENCY MEDICINE

## 2019-04-15 PROCEDURE — 96365 THER/PROPH/DIAG IV INF INIT: CPT

## 2019-04-15 PROCEDURE — 2580000003 HC RX 258: Performed by: EMERGENCY MEDICINE

## 2019-04-15 RX ORDER — KETOROLAC TROMETHAMINE 10 MG/1
10 TABLET, FILM COATED ORAL EVERY 6 HOURS PRN
Qty: 20 TABLET | Refills: 0 | Status: SHIPPED | OUTPATIENT
Start: 2019-04-15 | End: 2019-09-16 | Stop reason: ALTCHOICE

## 2019-04-15 RX ORDER — AMOXICILLIN AND CLAVULANATE POTASSIUM 875; 125 MG/1; MG/1
1 TABLET, FILM COATED ORAL 2 TIMES DAILY
Qty: 20 TABLET | Refills: 0 | Status: SHIPPED | OUTPATIENT
Start: 2019-04-15 | End: 2019-04-25

## 2019-04-15 RX ORDER — KETOROLAC TROMETHAMINE 30 MG/ML
30 INJECTION, SOLUTION INTRAMUSCULAR; INTRAVENOUS EVERY 6 HOURS PRN
Status: DISCONTINUED | OUTPATIENT
Start: 2019-04-15 | End: 2019-04-15 | Stop reason: HOSPADM

## 2019-04-15 RX ADMIN — KETOROLAC TROMETHAMINE 30 MG: 30 INJECTION, SOLUTION INTRAMUSCULAR; INTRAVENOUS at 17:20

## 2019-04-15 RX ADMIN — AMPICILLIN AND SULBACTAM 3 G: 2; 1 INJECTION, POWDER, FOR SOLUTION INTRAVENOUS at 17:24

## 2019-04-15 ASSESSMENT — PAIN DESCRIPTION - DESCRIPTORS
DESCRIPTORS: PRESSURE
DESCRIPTORS: THROBBING

## 2019-04-15 ASSESSMENT — PAIN SCALES - GENERAL
PAINLEVEL_OUTOF10: 10
PAINLEVEL_OUTOF10: 3
PAINLEVEL_OUTOF10: 10

## 2019-04-15 ASSESSMENT — PAIN DESCRIPTION - FREQUENCY: FREQUENCY: CONTINUOUS

## 2019-04-15 ASSESSMENT — PAIN DESCRIPTION - LOCATION
LOCATION: FACE;TEETH
LOCATION: FACE;TEETH

## 2019-04-15 ASSESSMENT — PAIN DESCRIPTION - ORIENTATION
ORIENTATION: LEFT
ORIENTATION: UPPER

## 2019-04-15 NOTE — ED NOTES
Swelling and redness and warmth left face from lower orbit to jaw line.      Ivanna Parham RN  04/15/19 [id number]

## 2019-04-15 NOTE — ED PROVIDER NOTES
HPI:  4/15/19,   Time: 4:58 PM         Debbie Putnam is a 63 y.o. female presenting to the ED for  Left-sided cheek swelling from  An abscessed tooth, beginning   when she woke up this morning several hours   ago. The complaint has been constant, severe in severity, and worsened by nothing. The patient states that she has several carious teeth    ROS:   Pertinent positives and negatives are stated within HPI, all other systems reviewed and are negative.  --------------------------------------------- PAST HISTORY ---------------------------------------------  Past Medical History:  has a past medical history of Alcoholic ([facility] Anxiety, Convulsions/seizures ([facility] Depression, GERD (gastroesophageal reflux disease), Headache, HTN (hypertension), Mixed hyperlipidemia, Osteoarthritis, Psychiatric problem, and Substance abuse ([facility] Past Surgical History:  has a past surgical history that includes Ectopic pregnancy surgery; Breast surgery (1988); and Tubal ligation (01/01/1993). Social History:  reports that she has been smoking cigarettes. She has a 7.50 pack-year smoking history. She has never used smokeless tobacco. She reports that she does not drink alcohol or use drugs. Family History: family history includes Arthritis in her father and mother; Diabetes in her father and mother; High Blood Pressure in her father and mother; High Cholesterol in her father and mother; Kidney Disease in her mother; Obesity in her father and mother; Other in her father; Stroke in her mother; Substance Abuse in her son. The patients home medications have been reviewed. Allergies: Patient has no known allergies. -------------------------------------------------- RESULTS -------------------------------------------------  All laboratory and radiology results have been personally reviewed by myself   LABS:  No results found for this visit on 04/15/19.     RADIOLOGY:  Interpreted by Radiologist.  No orders to display ------------------------- NURSING NOTES AND VITALS REVIEWED ---------------------------   The nursing notes within the ED encounter and vital signs as below have been reviewed. BP (!) 140/88   Pulse 80   Temp 98.8 °F (37.1 °C) (Oral)   Resp 14   Ht 5' 3\" (1.6 m)   Wt 150 lb (68 kg)   LMP 09/08/2011   SpO2 94%   BMI 26.57 kg/m²   Oxygen Saturation Interpretation: Normal      ---------------------------------------------------PHYSICAL EXAM--------------------------------------      Constitutional/General: Alert and oriented x3, well appearing, non toxic in NAD  Head: NC/AT  Eyes: PERRL, EOMI  Mouth:  #13 and 15 are both carious all the way down to the gumline and there is significant swelling with tenderness of the left cheek. There is no pointing or drainable abscess is palpable inside the mouth  Neck: Supple, full ROM, no meningeal signs  Pulmonary: Lungs clear to auscultation bilaterally, no wheezes, rales, or rhonchi. Not in respiratory distress  Cardiovascular:  Regular rate and rhythm, no murmurs, gallops, or rubs. 2+ distal pulses    Extremities: Moves all extremities x 4. Warm and well perfused  Skin: warm and dry without rash  Neurologic: GCS 15,  Psych: Normal Affect      ------------------------------ ED COURSE/MEDICAL DECISION MAKING----------------------  Medications   ampicillin-sulbactam (UNASYN) 3 g ivpb minibag (has no administration in time range)   ketorolac (TORADOL) injection 30 mg (has no administration in time range)         Medical Decision Making:          Counseling: The emergency provider has spoken with the patient and spouse/SO and discussed todays results, in addition to providing specific details for the plan of care and counseling regarding the diagnosis and prognosis. Questions are answered at this time and they are agreeable with the plan.      --------------------------------- IMPRESSION AND DISPOSITION ---------------------------------    IMPRESSION  1.  Dental abscess        DISPOSITION  Disposition: Discharge to home  Patient condition is stable                  Peyman Adan MD  04/15/19 [id number]

## 2019-04-16 LAB — PATHOLOGIST REVIEW: NORMAL

## 2019-04-17 ASSESSMENT — ENCOUNTER SYMPTOMS
SHORTNESS OF BREATH: 0
COLOR CHANGE: 0
VOMITING: 0
NAUSEA: 0
COUGH: 0
CONSTIPATION: 0
DIARRHEA: 0

## 2019-04-18 ENCOUNTER — CARE COORDINATION (OUTPATIENT)
Dept: CARE COORDINATION | Age: 59
End: 2019-04-18

## 2019-05-02 ENCOUNTER — TELEPHONE (OUTPATIENT)
Dept: ADMINISTRATIVE | Age: 59
End: 2019-05-02

## 2019-05-02 NOTE — TELEPHONE ENCOUNTER
Cancelled because she isn't feeling well and don't want to leave the house. She will call back to reschedule.

## 2019-06-19 ENCOUNTER — APPOINTMENT (OUTPATIENT)
Dept: GENERAL RADIOLOGY | Age: 59
End: 2019-06-19
Payer: COMMERCIAL

## 2019-06-19 ENCOUNTER — HOSPITAL ENCOUNTER (EMERGENCY)
Age: 59
Discharge: HOME | End: 2019-06-19
Payer: COMMERCIAL

## 2019-06-19 VITALS
SYSTOLIC BLOOD PRESSURE: 128 MMHG | TEMPERATURE: 99.3 F | OXYGEN SATURATION: 99 % | DIASTOLIC BLOOD PRESSURE: 78 MMHG | WEIGHT: 145 LBS | BODY MASS INDEX: 25.69 KG/M2 | HEIGHT: 63 IN | RESPIRATION RATE: 16 BRPM | HEART RATE: 68 BPM

## 2019-06-19 DIAGNOSIS — S90.31XA CONTUSION OF RIGHT FOOT, INITIAL ENCOUNTER: Primary | ICD-10-CM

## 2019-06-19 PROCEDURE — 73630 X-RAY EXAM OF FOOT: CPT

## 2019-06-19 PROCEDURE — 73610 X-RAY EXAM OF ANKLE: CPT

## 2019-06-19 PROCEDURE — 99283 EMERGENCY DEPT VISIT LOW MDM: CPT

## 2019-06-19 RX ORDER — IBUPROFEN 800 MG/1
800 TABLET ORAL EVERY 6 HOURS PRN
Qty: 16 TABLET | Refills: 0 | Status: SHIPPED | OUTPATIENT
Start: 2019-06-19 | End: 2019-09-16 | Stop reason: ALTCHOICE

## 2019-06-19 RX ORDER — OMEPRAZOLE 20 MG/1
20 CAPSULE, DELAYED RELEASE ORAL DAILY
COMMUNITY
End: 2020-05-27 | Stop reason: SDUPTHER

## 2019-06-19 ASSESSMENT — PAIN DESCRIPTION - ORIENTATION: ORIENTATION: RIGHT

## 2019-06-19 ASSESSMENT — PAIN DESCRIPTION - PAIN TYPE: TYPE: ACUTE PAIN

## 2019-06-19 ASSESSMENT — PAIN DESCRIPTION - FREQUENCY: FREQUENCY: CONTINUOUS

## 2019-06-19 ASSESSMENT — PAIN DESCRIPTION - DESCRIPTORS: DESCRIPTORS: DISCOMFORT

## 2019-06-19 ASSESSMENT — PAIN SCALES - GENERAL: PAINLEVEL_OUTOF10: 7

## 2019-06-19 ASSESSMENT — PAIN DESCRIPTION - LOCATION: LOCATION: FOOT

## 2019-06-19 NOTE — ED PROVIDER NOTES
06/19/19 1136   128/78 99.3 °F (37.4 °C) Oral 68 16 99 % 5' 3\" (1.6 m) 145 lb (65.8 kg)      Oxygen Saturation Interpretation: Normal.    Constitutional:  Alert, development consistent with age. Neck:  Normal ROM. Supple. Foot: Right dorsal 2nd, 3rd, 4th metatarsal(s). Tenderness:  mild. Swelling: Mild. Deformity: no.              ROM: full range with pain. Skin:  erythema measuring 4 cm by 4 cm, tenderness and swelling. Neurovascular: Motor deficit: none. Sensory deficit:   none. Pulse deficit: none. Capillary refill: normal.  Ankle:               Tenderness:  none. Swelling: None. Deformity: no.             ROM: full range of motion. Skin:  no erythema, rash or wounds noted. Gait:  normal.  Lymphatics: No lymphangitis or adenopathy noted. Neurological:  Oriented. Motor functions intact. Lab / Imaging Results   (All laboratory and radiology results have been personally reviewed by myself)  Labs:  No results found for this visit on 06/19/19. Imaging: All Radiology results interpreted by Radiologist unless otherwise noted. XR FOOT RIGHT (MIN 3 VIEWS)   Final Result   Negative. XR ANKLE RIGHT (MIN 3 VIEWS)   Final Result   Negative. ED Course / Medical Decision Making   Medications - No data to display     Consult(s):   none. Procedure(s):   none    Medical Decision Making:    Films were obtained based on moderate  suspicion for bony injury as per history/physical findings . Plan is subsequently for symptom control, limited use and  immobilization with appropriate outpatient follow-up. Marcia Huynh Counseling: The emergency provider has spoken with the patient and discussed todays results, in addition to providing specific details for the plan of care and counseling regarding the diagnosis and prognosis.   Questions are answered at this time and they are agreeable with the plan. Assessment      1. Contusion of right foot, initial encounter      Plan   Discharge to home  Patient condition is good    New Medications     Discharge Medication List as of 6/19/2019  1:00 PM        Electronically signed by NAYLA Grijalva CNP   DD: 6/19/19  **This report was transcribed using voice recognition software. Every effort was made to ensure accuracy; however, inadvertent computerized transcription errors may be present.   END OF ED PROVIDER NOTE        NAYLA Grijalva CNP  06/19/19 2995

## 2019-06-19 NOTE — ED NOTES
Discharge instructions given, patient verbalized their understanding, no other noted or stated problems at this time. Patient will follow up with primary doctor for care.      Marcelina eHredia RN  06/19/19 9748

## 2019-06-21 ENCOUNTER — CARE COORDINATION (OUTPATIENT)
Dept: CARE COORDINATION | Age: 59
End: 2019-06-21

## 2019-06-21 NOTE — CARE COORDINATION
Ambulatory Care Coordination ED Follow up Call  Left voice message for patient at [phone] (H)(M) received a recorded message stating the mail box is full unable to leave a message.    Reason for ED Visit:  Foot Pain  Diagnosis:  Contusion of right foot, initial encounter  Care Management Risk Score:  8

## 2019-09-16 ENCOUNTER — OFFICE VISIT (OUTPATIENT)
Dept: FAMILY MEDICINE CLINIC | Age: 59
End: 2019-09-16
Payer: COMMERCIAL

## 2019-09-16 ENCOUNTER — HOSPITAL ENCOUNTER (OUTPATIENT)
Dept: GENERAL RADIOLOGY | Age: 59
Discharge: HOME | End: 2019-09-18
Payer: COMMERCIAL

## 2019-09-16 ENCOUNTER — HOSPITAL ENCOUNTER (OUTPATIENT)
Age: 59
Discharge: HOME | End: 2019-09-18
Payer: COMMERCIAL

## 2019-09-16 VITALS
BODY MASS INDEX: 26.93 KG/M2 | OXYGEN SATURATION: 98 % | DIASTOLIC BLOOD PRESSURE: 91 MMHG | HEART RATE: 70 BPM | SYSTOLIC BLOOD PRESSURE: 160 MMHG | HEIGHT: 63 IN | WEIGHT: 152 LBS

## 2019-09-16 DIAGNOSIS — G89.29 CHRONIC RIGHT SHOULDER PAIN: Primary | ICD-10-CM

## 2019-09-16 DIAGNOSIS — G89.29 CHRONIC RIGHT SHOULDER PAIN: ICD-10-CM

## 2019-09-16 DIAGNOSIS — M25.511 CHRONIC RIGHT SHOULDER PAIN: Primary | ICD-10-CM

## 2019-09-16 DIAGNOSIS — E78.2 MIXED HYPERLIPIDEMIA: ICD-10-CM

## 2019-09-16 DIAGNOSIS — F33.9 RECURRENT MAJOR DEPRESSIVE DISORDER, REMISSION STATUS UNSPECIFIED (HCC): ICD-10-CM

## 2019-09-16 DIAGNOSIS — K21.9 CHRONIC GERD: ICD-10-CM

## 2019-09-16 DIAGNOSIS — Z13.31 POSITIVE DEPRESSION SCREENING: ICD-10-CM

## 2019-09-16 DIAGNOSIS — M25.511 CHRONIC RIGHT SHOULDER PAIN: ICD-10-CM

## 2019-09-16 DIAGNOSIS — I10 ESSENTIAL HYPERTENSION: ICD-10-CM

## 2019-09-16 PROCEDURE — 73030 X-RAY EXAM OF SHOULDER: CPT

## 2019-09-16 PROCEDURE — 99213 OFFICE O/P EST LOW 20 MIN: CPT | Performed by: STUDENT IN AN ORGANIZED HEALTH CARE EDUCATION/TRAINING PROGRAM

## 2019-09-16 PROCEDURE — 99212 OFFICE O/P EST SF 10 MIN: CPT | Performed by: STUDENT IN AN ORGANIZED HEALTH CARE EDUCATION/TRAINING PROGRAM

## 2019-09-16 RX ORDER — IBUPROFEN 800 MG/1
800 TABLET ORAL EVERY 6 HOURS PRN
Qty: 16 TABLET | Refills: 0 | Status: SHIPPED | OUTPATIENT
Start: 2019-09-16 | End: 2020-01-23 | Stop reason: SDUPTHER

## 2019-09-16 RX ORDER — ATORVASTATIN CALCIUM 10 MG/1
10 TABLET, FILM COATED ORAL DAILY
Qty: 30 TABLET | Refills: 12 | Status: SHIPPED
Start: 2019-09-16 | End: 2020-10-06 | Stop reason: SDUPTHER

## 2019-09-16 RX ORDER — HYDROCHLOROTHIAZIDE 12.5 MG/1
CAPSULE, GELATIN COATED ORAL
Qty: 30 CAPSULE | Refills: 11 | Status: SHIPPED
Start: 2019-09-16 | End: 2020-10-06 | Stop reason: SDUPTHER

## 2019-09-16 RX ORDER — RANITIDINE HCL 75 MG
75 TABLET ORAL 2 TIMES DAILY
Qty: 60 TABLET | Refills: 3 | Status: SHIPPED | OUTPATIENT
Start: 2019-09-16 | End: 2020-01-23 | Stop reason: ALTCHOICE

## 2019-09-16 RX ORDER — SERTRALINE HYDROCHLORIDE 100 MG/1
TABLET, FILM COATED ORAL
Qty: 30 TABLET | Refills: 3 | Status: SHIPPED | OUTPATIENT
Start: 2019-09-16 | End: 2020-02-04 | Stop reason: SDUPTHER

## 2019-09-16 ASSESSMENT — ENCOUNTER SYMPTOMS
SHORTNESS OF BREATH: 0
CONSTIPATION: 0
COUGH: 0
VOMITING: 0
ABDOMINAL DISTENTION: 0
ABDOMINAL PAIN: 0
NAUSEA: 0
DIARRHEA: 0

## 2019-09-16 NOTE — PROGRESS NOTES
S: 63 y.o. female presents today for Shoulder Pain (right)    R shoulder pain: chronic for years. Never got xray in past. Getting worse. ibuprofen does help at time. Wondering about steroid injection. No decreased strength. Hurts with above head. No paresthesia. O: VS: BP (!) 160/91 (Site: Right Upper Arm, Position: Sitting, Cuff Size: Medium Adult)   Pulse 70   Ht 5' 3\" (1.6 m)   Wt 152 lb (68.9 kg)   LMP 09/08/2011   SpO2 98%   BMI 26.93 kg/m²   AAO/NAD, appropriate affect for mood  CV:  RRR, no murmur  Resp: CTAB  MSK: neers, drop arm neg; painful with ROM above head - R side; no external deformity; no palpable tenderness; neg empty can;   Neck Spurling negative    Assessment/Plan:   1) R sided Shoulder impingement syndrome v OA - xray. Continue NSAIDs prn. F/u 21 month for possible steroid injection  2) Medication Refill  RTO: 1 month    Attending Physician Statement  I have discussed the case, including pertinent history and exam findings with the resident. I agree with the documented assessment and plan.       Electronically signed by Sarahi Naylor MD on 9/16/2019 at 1:23 PM

## 2019-09-16 NOTE — PROGRESS NOTES
DATE OF VISIT : 2019    Patient : Elina Millan   Sex : female   Age : 63 y.o.  : 1960   MRN : [id number]       Chief Complaint   Patient presents with    Shoulder Pain     right     Present Illness : Here for chronic right shoulder pain that has been getting worse over the last month or 2. She has been taking ibuprofen or Toradol different days to help the pain. States that she has been told in the past that she has osteoarthritis. States that it is just painful to move her shoulder around but has not noticed any decreased strength. Denies any paresthesias or other issues. Denies any neck pain. Denies any issues with her . Is agreeable to a steroid injection. Is also here for refill on her chronic medications. Denies any chest pain, shortness of breath, fevers, chills, nausea, vomiting, abdominal pain. Denies any SI/HI. Her depression is well controlled and it has been quite sometime since she states since she has had depression symptoms.       Past Medical History:   Diagnosis Date    Alcoholic ([address]     Anxiety     Convulsions/seizures ([address] 2011    Depression     GERD (gastroesophageal reflux disease)     Headache     HTN (hypertension) 2015    Mixed hyperlipidemia 2016    Osteoarthritis     Psychiatric problem     Substance abuse ([facility]     methadone     Family History   Problem Relation Age of Onset    Diabetes Mother     Kidney Disease Mother     Stroke Mother     High Cholesterol Mother     Arthritis Mother     High Blood Pressure Mother     Obesity Mother     Diabetes Father     Arthritis Father     High Blood Pressure Father     High Cholesterol Father     Obesity Father     Other Father     Substance Abuse Son      Social History     Tobacco Use   Smoking Status Current Every Day Smoker    Packs/day: 0.50    Years: 15.00    Pack years: 7.50    Types: Cigarettes   Smokeless Tobacco Never Used     Review of Systems :  Review of DAY    Positive depression screening  -     sertraline (ZOLOFT) 100 MG tablet; TAKE 1 TABLET BY MOUTH ONCE EVERY DAY    Chronic GERD  -     ranitidine (ZANTAC) 75 MG tablet; Take 1 tablet by mouth 2 times daily    PLAN: Will refill her meds as above. Likely has shoulder impingement syndrome and/or OA. Advised patient to take ibuprofen in combination with Tylenol for pain. Will consider a steroid injection next month if the pain has not helped and after she gets x-rays done on her shoulder. Counseled regarding the possible side effects, risks, benefits and alternatives to treatment; patient and/or guardian verbalizes understanding, agrees, feels comfortable with and wishes to proceed with above treatment plan. Advised patient to call with any new medication issues, and read all Rx info from pharmacy to assure aware of all possible risks and side effects of medication before taking. Patient and/or guardian verbalizes understanding and agrees with above counseling, assessment and plan. All questions answered. Call or go to ED immediately if symptoms worsen or persist.  Return in about 4 weeks (around 10/14/2019). , or sooner if necessary  ----------------------------------------------------------------  Donny Capone M.D.      Discussed with: Dr. Avril Zarco

## 2019-09-26 RX ORDER — OMEPRAZOLE 20 MG/1
20 CAPSULE, DELAYED RELEASE ORAL DAILY
Qty: 30 CAPSULE | Refills: 3 | OUTPATIENT
Start: 2019-09-26

## 2020-01-23 ENCOUNTER — OFFICE VISIT (OUTPATIENT)
Dept: FAMILY MEDICINE CLINIC | Age: 60
End: 2020-01-23
Payer: COMMERCIAL

## 2020-01-23 VITALS
HEIGHT: 63 IN | DIASTOLIC BLOOD PRESSURE: 70 MMHG | WEIGHT: 153 LBS | RESPIRATION RATE: 16 BRPM | HEART RATE: 70 BPM | SYSTOLIC BLOOD PRESSURE: 106 MMHG | TEMPERATURE: 98.8 F | OXYGEN SATURATION: 98 % | BODY MASS INDEX: 27.11 KG/M2

## 2020-01-23 PROCEDURE — 99212 OFFICE O/P EST SF 10 MIN: CPT | Performed by: STUDENT IN AN ORGANIZED HEALTH CARE EDUCATION/TRAINING PROGRAM

## 2020-01-23 PROCEDURE — 3017F COLORECTAL CA SCREEN DOC REV: CPT | Performed by: FAMILY MEDICINE

## 2020-01-23 PROCEDURE — 99214 OFFICE O/P EST MOD 30 MIN: CPT | Performed by: STUDENT IN AN ORGANIZED HEALTH CARE EDUCATION/TRAINING PROGRAM

## 2020-01-23 PROCEDURE — G8427 DOCREV CUR MEDS BY ELIG CLIN: HCPCS | Performed by: FAMILY MEDICINE

## 2020-01-23 PROCEDURE — G8484 FLU IMMUNIZE NO ADMIN: HCPCS | Performed by: FAMILY MEDICINE

## 2020-01-23 PROCEDURE — 20610 DRAIN/INJ JOINT/BURSA W/O US: CPT | Performed by: STUDENT IN AN ORGANIZED HEALTH CARE EDUCATION/TRAINING PROGRAM

## 2020-01-23 PROCEDURE — 2500000003 HC RX 250 WO HCPCS

## 2020-01-23 PROCEDURE — 4004F PT TOBACCO SCREEN RCVD TLK: CPT | Performed by: FAMILY MEDICINE

## 2020-01-23 PROCEDURE — 6360000002 HC RX W HCPCS

## 2020-01-23 PROCEDURE — 20605 DRAIN/INJ JOINT/BURSA W/O US: CPT | Performed by: STUDENT IN AN ORGANIZED HEALTH CARE EDUCATION/TRAINING PROGRAM

## 2020-01-23 PROCEDURE — G8419 CALC BMI OUT NRM PARAM NOF/U: HCPCS | Performed by: FAMILY MEDICINE

## 2020-01-23 RX ORDER — IBUPROFEN 800 MG/1
800 TABLET ORAL EVERY 6 HOURS PRN
Qty: 60 TABLET | Refills: 3 | Status: SHIPPED
Start: 2020-01-23 | End: 2020-12-28

## 2020-01-23 RX ORDER — LIDOCAINE HYDROCHLORIDE 10 MG/ML
6 INJECTION, SOLUTION INFILTRATION; PERINEURAL ONCE
Status: COMPLETED | OUTPATIENT
Start: 2020-01-23 | End: 2020-01-23

## 2020-01-23 RX ORDER — FLUTICASONE PROPIONATE 50 MCG
2 SPRAY, SUSPENSION (ML) NASAL DAILY
Qty: 1 BOTTLE | Refills: 5 | Status: SHIPPED
Start: 2020-01-23 | End: 2020-12-28

## 2020-01-23 RX ORDER — TRIAMCINOLONE ACETONIDE 40 MG/ML
40 INJECTION, SUSPENSION INTRA-ARTICULAR; INTRAMUSCULAR ONCE
Status: COMPLETED | OUTPATIENT
Start: 2020-01-23 | End: 2020-01-23

## 2020-01-23 RX ADMIN — TRIAMCINOLONE ACETONIDE 40 MG: 40 INJECTION, SUSPENSION INTRA-ARTICULAR; INTRAMUSCULAR at 11:48

## 2020-01-23 RX ADMIN — LIDOCAINE HYDROCHLORIDE 6 ML: 10 INJECTION, SOLUTION INFILTRATION; PERINEURAL at 11:46

## 2020-01-23 ASSESSMENT — ENCOUNTER SYMPTOMS
VOMITING: 0
NAUSEA: 0
ABDOMINAL PAIN: 0
SORE THROAT: 0
STRIDOR: 0
ABDOMINAL DISTENTION: 0
DIARRHEA: 0
TROUBLE SWALLOWING: 0
COUGH: 0
SINUS PRESSURE: 1
VOICE CHANGE: 0
SHORTNESS OF BREATH: 0
RHINORRHEA: 1
SINUS PAIN: 0
CONSTIPATION: 0

## 2020-01-23 ASSESSMENT — PATIENT HEALTH QUESTIONNAIRE - PHQ9
1. LITTLE INTEREST OR PLEASURE IN DOING THINGS: 0
SUM OF ALL RESPONSES TO PHQ9 QUESTIONS 1 & 2: 0
SUM OF ALL RESPONSES TO PHQ QUESTIONS 1-9: 0
SUM OF ALL RESPONSES TO PHQ QUESTIONS 1-9: 0
2. FEELING DOWN, DEPRESSED OR HOPELESS: 0

## 2020-01-23 NOTE — PROGRESS NOTES
hyperlipidemia 2/17/2016    Osteoarthritis     Psychiatric problem     Substance abuse ([facility]     methadone     Family History   Problem Relation Age of Onset    Diabetes Mother     Kidney Disease Mother     Stroke Mother     High Cholesterol Mother     Arthritis Mother     High Blood Pressure Mother     Obesity Mother     Diabetes Father     Arthritis Father     High Blood Pressure Father     High Cholesterol Father     Obesity Father     Other Father     Substance Abuse Son      Social History     Tobacco Use   Smoking Status Current Every Day Smoker    Packs/day: 0.50    Years: 15.00    Pack years: 7.50    Types: Cigarettes   Smokeless Tobacco Never Used     Review of Systems :  Review of Systems   Constitutional: Negative for activity change, appetite change, chills, fatigue and fever. HENT: Positive for postnasal drip, rhinorrhea and sinus pressure. Negative for congestion, sinus pain, sore throat, trouble swallowing and voice change. Respiratory: Negative for cough, shortness of breath and stridor. Cardiovascular: Negative for chest pain, palpitations and leg swelling. Gastrointestinal: Negative for abdominal distention, abdominal pain, constipation, diarrhea, nausea and vomiting. Endocrine: Negative for polydipsia, polyphagia and polyuria. Musculoskeletal: Negative for neck pain and neck stiffness. Skin: Negative for rash and wound. Neurological: Positive for numbness. Negative for dizziness, seizures and facial asymmetry.      Physical Exam :    VITAL SIGNS :   Vitals:    01/23/20 1054   BP: 106/70   Pulse: 70   Resp: 16   Temp: 98.8 °F (37.1 °C)   TempSrc: Oral   SpO2: 98%   Weight: 153 lb (69.4 kg)   Height: 5' 3\" (1.6 m)     General Appearance: alert and oriented to person, place and time and in no acute distress  Skin: warm and dry  Head: normocephalic and atraumatic  Nose: No congestion, normal turbinates  Eyes: pupils equal, round, and reactive to light, extraocular eye movements intact, conjunctivae normal  Neck: neck supple and non tender without mass   Extremities: no cyanosis, no clubbing and no edema   Shoulder Exam:  On evaluation of her bilaterally upper extremities, her bilateral shoulder has no deformity. There is tenderness upon palpation of the [facility] [place] joint on R shoulder. None on L shoulder. There is not evidence of scapular dyskinesis. There is not muscle atrophy in shoulder girdle. The range of motion for the Right Shoulder is normal with pain and for the Left shoulder is normal without pain. Right shoulder Motor strength is 5/5 in the supraspinatus, 5/5 internal rotation and 5/5 in external rotation, and Left shoulder motor strength 5/5 in supraspinatus, 5/5 in internal rotation, 5/5 in external rotation. Right shoulder:  positive Impingement , positive Villegas ,negative  Speeds,negative   Negative belly push     Left shoulder:  negative Impingement , negative Villegas ,negative  Speeds,negative  Negative belly push     Bilateral Hands: Negative Grimsley's bilaterally, positive Phalen's bilaterally on wrist, negative Phalen's at medial cubital fossa, 5 out of 5 strength in wrist strength and metacarpals, full ROM active and passive of the hands bilaterally    Assessment & Plan :    Jessenia Adkins was seen today for shoulder pain, wrist pain, allergies and health maintenance. Diagnoses and all orders for this visit:    Chronic right shoulder pain  -     ibuprofen (ADVIL;MOTRIN) 800 MG tablet; Take 1 tablet by mouth every 6 hours as needed for Pain  -     lidocaine 1 % injection 6 mL  -     triamcinolone acetonide (KENALOG-40) injection 40 mg    Seasonal allergic rhinitis, unspecified trigger  -     fluticasone (FLONASE) 50 MCG/ACT nasal spray; 2 sprays by Each Nostril route daily    Bilateral carpal tunnel syndrome    Plan: Steroid injection. Procedure note below. Patient advised to stop taking Afrin completely. We will try Flonase. Will assess response.

## 2020-01-23 NOTE — PATIENT INSTRUCTIONS
1. Shoulder and chest stretch  2. While sitting, relax your upper body so you slump slightly in your chair. 3. As you breathe in, straighten your back and open your arms out to the sides. 4. Gently pull your shoulder blades back and downward. 5. Hold for 15 to 30 seconds as your breathe normally. 6. Repeat 2 to 4 times. Overhead stretch   1. Reach up over your head with both arms. 2. Hold for 15 to 30 seconds. 3. Repeat 2 to 4 times. Follow-up care is a key part of your treatment and safety. Be sure to make and go to all appointments, and call your doctor if you are having problems. It's also a good idea to know your test results and keep a list of the medicines you take. Where can you learn more? Go to https://Applimationrae.[email or web address] org and sign in to your [employer] account. Enter S254 in the TriCipher box to learn more about \"Shoulder Stretches: Exercises. \"     If you do not have an account, please click on the \"Sign Up Now\" link. Current as of: June 26, 2019  Content Version: 12.3  © 6870-3321 Healthwise, Incorporated. Care instructions adapted under license by [facility] ([facility]). If you have questions about a medical condition or this instruction, always ask your healthcare professional. [address] any warranty or liability for your use of this information. Patient Education        Neck Spasm: Exercises  Introduction  Here are some examples of exercises for you to try. The exercises may be suggested for a condition or for rehabilitation. Start each exercise slowly. Ease off the exercises if you start to have pain. You will be told when to start these exercises and which ones will work best for you. How to do the exercises  Levator scapula stretch   1. Sit in a firm chair, or stand up straight. 2. Gently tilt your head toward your left shoulder. 3. Turn your head to look down into your armpit, bending your head slightly forward.  Let the weight of your head stretch your neck muscles. 4. Hold for 15 to 30 seconds. 5. Return to your starting position. 6. Follow the same instructions above, but tilt your head toward your right shoulder. 7. Repeat 2 to 4 times toward each shoulder. Upper trapezius stretch   1. Sit in a firm chair, or stand up straight. 2. This stretch works best if you keep your shoulder down as you lean away from it. To help you remember to do this, start by relaxing your shoulders and lightly holding on to your thighs or your chair. 3. Tilt your head toward your shoulder and hold for 15 to 30 seconds. Let the weight of your head stretch your muscles. 4. If you would like a little added stretch, place your arm behind your back. Use the arm opposite of the direction you are tilting your head. For example, if you are tilting your head to the left, place your right arm behind your back. 5. Repeat 2 to 4 times toward each shoulder. Neck rotation   1. Sit in a firm chair, or stand up straight. 2. Keeping your chin level, turn your head to the right, and hold for 15 to 30 seconds. 3. Turn your head to the left, and hold for 15 to 30 seconds. 4. Repeat 2 to 4 times to each side. Chin tuck   1. Lie on the floor with a rolled-up towel under your neck. Your head should be touching the floor. 2. Slowly bring your chin toward the front of your neck. 3. Hold for a count of 6, and then relax for up to 10 seconds. 4. Repeat 8 to 12 times. Forward neck flexion   1. Sit in a firm chair, or stand up straight. 2. Bend your head forward. 3. Hold for 15 to 30 seconds, then return to your starting position. 4. Repeat 2 to 4 times. Follow-up care is a key part of your treatment and safety. Be sure to make and go to all appointments, and call your doctor if you are having problems. It's also a good idea to know your test results and keep a list of the medicines you take. Where can you learn more?   Go to https://chpepiceweb.[email or web address] org and sign in to your [employer] account. Enter P962 in the [employer] box to learn more about \"Neck Spasm: Exercises. \"     If you do not have an account, please click on the \"Sign Up Now\" link. Current as of: June 26, 2019  Content Version: 12.3  © 8195-0733 Healthwise, [employer]. Care instructions adapted under license by [facility] ([facility]). If you have questions about a medical condition or this instruction, always ask your healthcare professional. [address] any warranty or liability for your use of this information.

## 2020-01-23 NOTE — PROGRESS NOTES
Camilla Home 63 y.o. female  here for E/M right shoulder pain, bilateral numbness of hands, requests for allergy work up  Shoulder pain: right side. Longstanding, worse at night, ROM is painful. No weakness or paresthesias, no neck pain. Trial of NSAIDS unsuccessful  Numbness of hands: bilateral, occurs only with writing. No pain, no nighttime symptoms  Chronic congestion, itchy eyes/nose/palate. Had been using Afrin regularly for 1 year and stopped 2 weeks ago with no symptom relief. This is only medication trial  O: VS: /70   Pulse 70   Temp 98.8 °F (37.1 °C) (Oral)   Resp 16   Ht 5' 3\" (1.6 m)   Wt 153 lb (69.4 kg)   LMP 09/08/2011   SpO2 98%   BMI 27.10 kg/m²    General: NAD              HEENT: unremarakble    Ext:  no C/C/E. Bilateral wrists: Phalen's posotove; Tinel's negative. Strength intact              Right Shoulder:  ROM painful but complete. Villegas' positive; Neer's negative; drop arm negative. Strength and sensation intact    Assessment / Plan:      Jarrod Carvalho was seen today for shoulder pain, wrist pain, allergies and health maintenance. Diagnoses and all orders for this visit:    Chronic right shoulder pain  -     ibuprofen (ADVIL;MOTRIN) 800 MG tablet; Take 1 tablet by mouth every 6 hours as needed for Pain  -     lidocaine 1 % injection 6 mL  -     triamcinolone acetonide (KENALOG-40) injection 40 mg     Seasonal allergic rhinitis, unspecified trigger  -     fluticasone (FLONASE) 50 MCG/ACT nasal spray; 2 sprays by Each Nostril route daily     Bilateral carpal tunnel syndrome     Plan: Steroid injection. Procedure note below.     Patient advised to stop taking Afrin completely. We will try Flonase. Will assess response. Most likely has seasonal allergic rhinitis or rebound rhinitis from chronic Afrin use.     Patient likely has bilateral carpal tunnel syndrome. Advised to use wrist splints. Patient is not in pain at this time.   Would advise physical therapy at next visit if does not improve.     Patient likely has right shoulder impingement and this was discussed. Steroid injection as mentioned. Home exercises at this time. Patient is reluctant to do physical therapy. Will follow-up with patient in 1 month to assess response to the aforementioned.     Procedure Note Cortisone Injection to Shoulder     The right shoulder was identified as the injection site. The risk and benefits of a cortisone injection were explained and the patient consented to the injection. Under sterile conditions, the shoulder  was injected with a mixture of 40mg of Kenelog and 6 mL of 1% Lidocaine without complication. A sterile bandage was applied. Dr. Letty Vanegas was present for the entire procedure. Chronic shoulder pain: DDx include impingement. IA injection today  Bilateral wrist numbness: DDx includes temporary entrapment  Allergic Rhinitis with element of [employer]amentosum: trial Flonase for symptom relief         No follow-ups on file. FU 4 weeks    Attending Physician Statement  I have discussed the case, including pertinent history and exam findings with the resident. I also have seen the patient and performed key portions of the examination. I agree with the documented assessment and plan.          Dashawn Nevarez MD

## 2020-02-04 RX ORDER — SERTRALINE HYDROCHLORIDE 100 MG/1
TABLET, FILM COATED ORAL
Qty: 30 TABLET | Refills: 3 | Status: SHIPPED
Start: 2020-02-04 | End: 2020-06-29

## 2020-02-04 NOTE — TELEPHONE ENCOUNTER
Last Appointment:  12/27/2017  Future Appointments   Date Time Provider Dandre Onofre   2/25/2020  1:20 PM Lamont Lopes, MD [facility]

## 2020-03-25 PROBLEM — R19.7 DIARRHEA: Status: RESOLVED | Noted: 2017-02-08 | Resolved: 2020-03-24

## 2020-04-23 ENCOUNTER — TELEPHONE (OUTPATIENT)
Dept: ADMINISTRATIVE | Age: 60
End: 2020-04-23

## 2020-05-27 RX ORDER — OMEPRAZOLE 20 MG/1
20 CAPSULE, DELAYED RELEASE ORAL DAILY
Qty: 30 CAPSULE | Refills: 3 | Status: SHIPPED
Start: 2020-05-27 | End: 2020-10-14 | Stop reason: SDUPTHER

## 2020-06-16 ENCOUNTER — APPOINTMENT (OUTPATIENT)
Dept: GENERAL RADIOLOGY | Age: 60
End: 2020-06-16
Payer: COMMERCIAL

## 2020-06-16 ENCOUNTER — HOSPITAL ENCOUNTER (EMERGENCY)
Age: 60
Discharge: HOME | End: 2020-06-16
Attending: EMERGENCY MEDICINE
Payer: COMMERCIAL

## 2020-06-16 ENCOUNTER — APPOINTMENT (OUTPATIENT)
Dept: CT IMAGING | Age: 60
End: 2020-06-16
Payer: COMMERCIAL

## 2020-06-16 VITALS
BODY MASS INDEX: 25.61 KG/M2 | WEIGHT: 150 LBS | RESPIRATION RATE: 16 BRPM | DIASTOLIC BLOOD PRESSURE: 69 MMHG | OXYGEN SATURATION: 98 % | TEMPERATURE: 98 F | HEIGHT: 64 IN | SYSTOLIC BLOOD PRESSURE: 131 MMHG | HEART RATE: 74 BPM

## 2020-06-16 PROCEDURE — 96374 THER/PROPH/DIAG INJ IV PUSH: CPT

## 2020-06-16 PROCEDURE — 73610 X-RAY EXAM OF ANKLE: CPT

## 2020-06-16 PROCEDURE — 70450 CT HEAD/BRAIN W/O DYE: CPT

## 2020-06-16 PROCEDURE — 6360000002 HC RX W HCPCS: Performed by: EMERGENCY MEDICINE

## 2020-06-16 PROCEDURE — 99284 EMERGENCY DEPT VISIT MOD MDM: CPT

## 2020-06-16 PROCEDURE — 72125 CT NECK SPINE W/O DYE: CPT

## 2020-06-16 PROCEDURE — 73080 X-RAY EXAM OF ELBOW: CPT

## 2020-06-16 PROCEDURE — 73562 X-RAY EXAM OF KNEE 3: CPT

## 2020-06-16 PROCEDURE — 6370000000 HC RX 637 (ALT 250 FOR IP): Performed by: EMERGENCY MEDICINE

## 2020-06-16 RX ORDER — KETOROLAC TROMETHAMINE 30 MG/ML
30 INJECTION, SOLUTION INTRAMUSCULAR; INTRAVENOUS ONCE
Status: COMPLETED | OUTPATIENT
Start: 2020-06-16 | End: 2020-06-16

## 2020-06-16 RX ORDER — DIAPER,BRIEF,INFANT-TODD,DISP
EACH MISCELLANEOUS ONCE
Status: COMPLETED | OUTPATIENT
Start: 2020-06-16 | End: 2020-06-16

## 2020-06-16 RX ADMIN — KETOROLAC TROMETHAMINE 30 MG: 30 INJECTION, SOLUTION INTRAMUSCULAR at 17:31

## 2020-06-16 RX ADMIN — BACITRACIN ZINC: 500 OINTMENT TOPICAL at 17:37

## 2020-06-16 ASSESSMENT — ENCOUNTER SYMPTOMS
CONSTIPATION: 0
EYE REDNESS: 0
SHORTNESS OF BREATH: 0
ABDOMINAL DISTENTION: 0
FACIAL SWELLING: 0
RHINORRHEA: 0
EYE PAIN: 0
NAUSEA: 0
DIARRHEA: 0
PHOTOPHOBIA: 0
COUGH: 0
COLOR CHANGE: 0
CHEST TIGHTNESS: 0

## 2020-06-16 ASSESSMENT — PAIN SCALES - GENERAL
PAINLEVEL_OUTOF10: 6
PAINLEVEL_OUTOF10: 9

## 2020-06-16 ASSESSMENT — PAIN DESCRIPTION - PAIN TYPE: TYPE: ACUTE PAIN

## 2020-06-16 ASSESSMENT — PAIN DESCRIPTION - ORIENTATION: ORIENTATION: LEFT;RIGHT
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
## 2020-06-16 NOTE — ED NOTES
Bed: 01  Expected date:   Expected time:   Means of arrival:   Comments:  lanes Verdis Adam, RN  06/16/20 8166

## 2020-06-29 RX ORDER — SERTRALINE HYDROCHLORIDE 100 MG/1
TABLET, FILM COATED ORAL
Qty: 30 TABLET | Refills: 3 | Status: SHIPPED
Start: 2020-06-29 | End: 2020-11-06 | Stop reason: SDUPTHER

## 2020-10-06 RX ORDER — ATORVASTATIN CALCIUM 10 MG/1
10 TABLET, FILM COATED ORAL DAILY
Qty: 30 TABLET | Refills: 12 | Status: SHIPPED | OUTPATIENT
Start: 2020-10-06 | End: 2021-10-11

## 2020-10-06 RX ORDER — HYDROCHLOROTHIAZIDE 12.5 MG/1
CAPSULE, GELATIN COATED ORAL
Qty: 30 CAPSULE | Refills: 11 | Status: SHIPPED | OUTPATIENT
Start: 2020-10-06 | End: 2021-08-10

## 2020-10-14 RX ORDER — OMEPRAZOLE 20 MG/1
20 CAPSULE, DELAYED RELEASE ORAL DAILY
Qty: 30 CAPSULE | Refills: 3 | Status: SHIPPED
Start: 2020-10-14 | End: 2021-02-19 | Stop reason: SDUPTHER

## 2020-11-06 RX ORDER — SERTRALINE HYDROCHLORIDE 100 MG/1
TABLET, FILM COATED ORAL
Qty: 30 TABLET | Refills: 3 | Status: SHIPPED
Start: 2020-11-06 | End: 2020-12-02 | Stop reason: SDUPTHER

## 2020-11-06 NOTE — TELEPHONE ENCOUNTER
Last Appointment:  3/13/2019  Future Appointments   Date Time Provider Dandre Onofre   11/12/2020  9:40 AM MD Patti Mishra [facility]

## 2020-12-02 RX ORDER — SERTRALINE HYDROCHLORIDE 100 MG/1
TABLET, FILM COATED ORAL
Qty: 60 TABLET | Refills: 3 | OUTPATIENT
Start: 2020-12-02

## 2020-12-02 RX ORDER — SERTRALINE HYDROCHLORIDE 100 MG/1
TABLET, FILM COATED ORAL
Qty: 30 TABLET | Refills: 3 | Status: SHIPPED
Start: 2020-12-02 | End: 2021-03-04 | Stop reason: SDUPTHER

## 2020-12-14 ENCOUNTER — OFFICE VISIT (OUTPATIENT)
Dept: FAMILY MEDICINE CLINIC | Age: 60
End: 2020-12-14
Payer: COMMERCIAL

## 2020-12-14 VITALS
SYSTOLIC BLOOD PRESSURE: 133 MMHG | RESPIRATION RATE: 16 BRPM | TEMPERATURE: 98.5 F | HEIGHT: 63 IN | HEART RATE: 76 BPM | DIASTOLIC BLOOD PRESSURE: 83 MMHG | WEIGHT: 157 LBS | BODY MASS INDEX: 27.82 KG/M2 | OXYGEN SATURATION: 98 %

## 2020-12-14 DIAGNOSIS — I10 HYPERTENSION, UNSPECIFIED TYPE: ICD-10-CM

## 2020-12-14 PROBLEM — F32.A DEPRESSION: Status: ACTIVE | Noted: 2020-12-14

## 2020-12-14 PROBLEM — M25.511 RIGHT SHOULDER PAIN: Status: ACTIVE | Noted: 2020-12-14

## 2020-12-14 LAB
ALBUMIN SERPL-MCNC: 4.8 G/DL (ref 3.5–5.2)
ALP BLD-CCNC: 113 U/L (ref 35–104)
ALT SERPL-CCNC: 15 U/L (ref 0–32)
ANION GAP SERPL CALCULATED.3IONS-SCNC: 15 MMOL/L (ref 7–16)
AST SERPL-CCNC: 19 U/L (ref 0–31)
BILIRUB SERPL-MCNC: 0.3 MG/DL (ref 0–1.2)
BUN BLDV-MCNC: 16 MG/DL (ref 6–20)
CALCIUM SERPL-MCNC: 9.9 MG/DL (ref 8.6–10.2)
CHLORIDE BLD-SCNC: 101 MMOL/L (ref 98–107)
CHOLESTEROL, TOTAL: 216 MG/DL (ref 0–199)
CO2: 23 MMOL/L (ref 22–29)
CREAT SERPL-MCNC: 0.6 MG/DL (ref 0.5–1)
GFR AFRICAN AMERICAN: >60
GFR NON-AFRICAN AMERICAN: >60 ML/MIN/1.73
GLUCOSE BLD-MCNC: 94 MG/DL (ref 74–99)
HDLC SERPL-MCNC: 62 MG/DL
LDL CHOLESTEROL CALCULATED: 117 MG/DL (ref 0–99)
POTASSIUM SERPL-SCNC: 3.5 MMOL/L (ref 3.5–5)
SODIUM BLD-SCNC: 139 MMOL/L (ref 132–146)
TOTAL PROTEIN: 7.8 G/DL (ref 6.4–8.3)
TRIGL SERPL-MCNC: 187 MG/DL (ref 0–149)
VLDLC SERPL CALC-MCNC: 37 MG/DL

## 2020-12-14 PROCEDURE — 36415 COLL VENOUS BLD VENIPUNCTURE: CPT | Performed by: FAMILY MEDICINE

## 2020-12-14 PROCEDURE — 6360000002 HC RX W HCPCS

## 2020-12-14 PROCEDURE — G8427 DOCREV CUR MEDS BY ELIG CLIN: HCPCS | Performed by: STUDENT IN AN ORGANIZED HEALTH CARE EDUCATION/TRAINING PROGRAM

## 2020-12-14 PROCEDURE — G0008 ADMIN INFLUENZA VIRUS VAC: HCPCS

## 2020-12-14 PROCEDURE — G8482 FLU IMMUNIZE ORDER/ADMIN: HCPCS | Performed by: STUDENT IN AN ORGANIZED HEALTH CARE EDUCATION/TRAINING PROGRAM

## 2020-12-14 PROCEDURE — 99213 OFFICE O/P EST LOW 20 MIN: CPT | Performed by: STUDENT IN AN ORGANIZED HEALTH CARE EDUCATION/TRAINING PROGRAM

## 2020-12-14 PROCEDURE — 2500000003 HC RX 250 WO HCPCS

## 2020-12-14 PROCEDURE — 90686 IIV4 VACC NO PRSV 0.5 ML IM: CPT

## 2020-12-14 PROCEDURE — 4004F PT TOBACCO SCREEN RCVD TLK: CPT | Performed by: STUDENT IN AN ORGANIZED HEALTH CARE EDUCATION/TRAINING PROGRAM

## 2020-12-14 PROCEDURE — G8419 CALC BMI OUT NRM PARAM NOF/U: HCPCS | Performed by: STUDENT IN AN ORGANIZED HEALTH CARE EDUCATION/TRAINING PROGRAM

## 2020-12-14 PROCEDURE — 20610 DRAIN/INJ JOINT/BURSA W/O US: CPT | Performed by: STUDENT IN AN ORGANIZED HEALTH CARE EDUCATION/TRAINING PROGRAM

## 2020-12-14 PROCEDURE — 99212 OFFICE O/P EST SF 10 MIN: CPT | Performed by: STUDENT IN AN ORGANIZED HEALTH CARE EDUCATION/TRAINING PROGRAM

## 2020-12-14 PROCEDURE — 3017F COLORECTAL CA SCREEN DOC REV: CPT | Performed by: STUDENT IN AN ORGANIZED HEALTH CARE EDUCATION/TRAINING PROGRAM

## 2020-12-14 RX ORDER — SERTRALINE HYDROCHLORIDE 100 MG/1
TABLET, FILM COATED ORAL
Qty: 90 TABLET | Refills: 3 | Status: CANCELLED | OUTPATIENT
Start: 2020-12-14

## 2020-12-14 RX ORDER — LIDOCAINE HYDROCHLORIDE 10 MG/ML
4 INJECTION, SOLUTION INFILTRATION; PERINEURAL ONCE
Status: COMPLETED | OUTPATIENT
Start: 2020-12-14 | End: 2020-12-14

## 2020-12-14 RX ORDER — TRIAMCINOLONE ACETONIDE 40 MG/ML
40 INJECTION, SUSPENSION INTRA-ARTICULAR; INTRAMUSCULAR ONCE
Status: COMPLETED | OUTPATIENT
Start: 2020-12-14 | End: 2020-12-14

## 2020-12-14 RX ADMIN — LIDOCAINE HYDROCHLORIDE 4 ML: 10 INJECTION, SOLUTION INFILTRATION; PERINEURAL at 12:04

## 2020-12-14 RX ADMIN — TRIAMCINOLONE ACETONIDE 40 MG: 40 INJECTION, SUSPENSION INTRA-ARTICULAR; INTRAMUSCULAR at 12:06

## 2020-12-14 ASSESSMENT — ENCOUNTER SYMPTOMS
SORE THROAT: 0
BACK PAIN: 0
SHORTNESS OF BREATH: 0
ABDOMINAL PAIN: 0
DIARRHEA: 0
VOMITING: 0
EYE PAIN: 0
CHEST TIGHTNESS: 0
EYE ITCHING: 0

## 2020-12-14 ASSESSMENT — PATIENT HEALTH QUESTIONNAIRE - PHQ9
SUM OF ALL RESPONSES TO PHQ QUESTIONS 1-9: 0
1. LITTLE INTEREST OR PLEASURE IN DOING THINGS: 0
SUM OF ALL RESPONSES TO PHQ QUESTIONS 1-9: 0
SUM OF ALL RESPONSES TO PHQ QUESTIONS 1-9: 0
SUM OF ALL RESPONSES TO PHQ9 QUESTIONS 1 & 2: 0
2. FEELING DOWN, DEPRESSED OR HOPELESS: 0

## 2020-12-14 NOTE — PATIENT INSTRUCTIONS
Patient Education        Learning About Benefits From Quitting Smoking  How does quitting smoking make you healthier? If you're thinking about quitting smoking, you may have a few reasons to be smoke-free. Your health may be one of them. · When you quit smoking, you lower your risks for cancer, lung disease, heart attack, stroke, blood vessel disease, and blindness from macular degeneration. · When you're smoke-free, you get sick less often, and you heal faster. You are less likely to get colds, flu, bronchitis, and pneumonia. · As a nonsmoker, you may find that your mood is better and you are less stressed. When and how will you feel healthier? Quitting has real health benefits that start from day 1 of being smoke-free. And the longer you stay smoke-free, the healthier you get and the better you feel. The first hours  · After just 20 minutes, your blood pressure and heart rate go down. That means there's less stress on your heart and blood vessels. · Within 12 hours, the level of carbon monoxide in your blood drops back to normal. That makes room for more oxygen. With more oxygen in your body, you may notice that you have more energy than when you smoked. After 2 weeks  · Your lungs start to work better. · Your risk of heart attack starts to drop. After 1 month  · When your lungs are clear, you cough less and breathe deeper, so it's easier to be active. · Your sense of taste and smell return. That means you can enjoy food more than you have since you started smoking. Over the years  · Over the years, your risks of heart disease, heart attack, and stroke are lower. · After 10 years, your risk of dying from lung cancer is cut by about half. And your risk for many other types of cancer is lower too. How would quitting help others in your life? When you quit smoking, you improve the health of everyone who now breathes in your smoke. · Their heart, lung, and cancer risks drop, much like yours. · They are sick less. For babies and small children, living smoke-free means they're less likely to have ear infections, pneumonia, and bronchitis. · If you're a woman who is or will be pregnant someday, quitting smoking means a healthier . · Children who are close to you are less likely to become adult smokers. Where can you learn more? Go to https://chpepiceweb.DentLight. org and sign in to your [employer] account. Enter [id number] in the [facility] box to learn more about \"Learning About Benefits From Quitting Smoking. \"     If you do not have an account, please click on the \"Sign Up Now\" link. Current as of: 2020               Content Version: 12.6  © 2839-5517 [employer]. Care instructions adapted under license by [facility] ([facility]). If you have questions about a medical condition or this instruction, always ask your healthcare professional. [place] [address] any warranty or liability for your use of this information. Patient Education        Rotator Cuff: Exercises  Introduction  Here are some examples of exercises for you to try. The exercises may be suggested for a condition or for rehabilitation. Start each exercise slowly. Ease off the exercises if you start to have pain. You will be told when to start these exercises and which ones will work best for you. How to do the exercises  Pendulum swing   If you have pain in your back, do not do this exercise. 1. Hold on to a table or the back of a chair with your good arm. Then bend forward a little and let your sore arm hang straight down. This exercise does not use the arm muscles. Rather, use your legs and your hips to create movement that makes your arm swing freely. 2. Use the movement from your hips and legs to guide the slightly swinging arm back and forth like a pendulum (or elephant trunk). Then guide it in circles that start small (about the size of a dinner plate). Make the circles a bit larger each day, as your pain allows. 3. Do this exercise for 5 minutes, 5 to 7 times each day. 4. As you have less pain, try bending over a little farther to do this exercise. This will increase the amount of movement at your shoulder. Posterior stretching exercise   1. Hold the elbow of your injured arm with your other hand. 2. Use your hand to pull your injured arm gently up and across your body. You will feel a gentle stretch across the back of your injured shoulder. 3. Hold for at least 15 to 30 seconds. Then slowly lower your arm. 4. Repeat 2 to 4 times. Up-the-back stretch   Your doctor or physical therapist may want you to wait to do this stretch until you have regained most of your range of motion and strength. You can do this stretch in different ways. Hold any of these stretches for at least 15 to 30 seconds. Repeat them 2 to 4 times. 1. Light stretch: Put your hand in your back pocket. Let it rest there to stretch your shoulder. 2. Moderate stretch: With your other hand, hold your injured arm (palm outward) behind your back by the wrist. Pull your arm up gently to stretch your shoulder. 3. Advanced stretch: Put a towel over your other shoulder. Put the hand of your injured arm behind your back. Now hold the back end of the towel. With the other hand, hold the front end of the towel in front of your body. Pull gently on the front end of the towel. This will bring your hand farther up your back to stretch your shoulder. Overhead stretch   1. Standing about an arm's length away, grasp onto a solid surface. You could use a countertop, a doorknob, or the back of a sturdy chair. 5. Repeat at least 2 to 4 times. Try to reach higher each time. Wall climbing (to the front)   During this stretching exercise, be careful not to arch your back. 1. Face a wall, and stand so your fingers can just touch it. 2. Keeping your shoulder down, walk the fingers of your injured arm up the wall as high as pain permits. (Don't shrug your shoulder up toward your ear.)  3. Hold your arm in that position for at least 15 to 30 seconds. 4. Slowly walk your fingers back down to where you started. 5. Repeat at least 2 to 4 times. Try to reach higher each time. Shoulder blade squeeze   1. Stand with your arms at your sides, and squeeze your shoulder blades together. Do not raise your shoulders up as you squeeze. 2. Hold 6 seconds. 3. Repeat 8 to 12 times. Scapular exercise: Arm reach   1. Lie flat on your back. This exercise is a very slight motion that starts with your arms raised (elbows straight, arms straight). 2. From this position, reach higher toward the gil or ceiling. Keep your elbows straight. All motion should be from your shoulder blade only. 3. Relax your arms back to where you started. 4. Repeat 8 to 12 times. Arm raise to the side   During this strengthening exercise, your arm should stay about 30 degrees to the front of your side. 1. Slowly raise your injured arm to the side, with your thumb facing up. Raise your arm 60 degrees at the most (shoulder level is 90 degrees). 2. Hold the position for 3 to 5 seconds. Then lower your arm back to your side. If you need to, bring your \"good\" arm across your body and place it under the elbow as you lower your injured arm. Use your good arm to keep your injured arm from dropping down too fast.  3. Repeat 8 to 12 times. 4. When you first start out, don't hold any extra weight in your hand. As you get stronger, you may use a 1-pound to 2-pound dumbbell or a small can of food.     Shoulder flexor and extensor exercise These are isometric exercises. That means you contract your muscles without actually moving. 1. Push forward (flex): Stand facing a wall or doorjamb, about 6 inches or less back. Hold your injured arm against your body. Make a closed fist with your thumb on top. Then gently push your hand forward into the wall with about 25% to 50% of your strength. Don't let your body move backward as you push. Hold for about 6 seconds. Relax for a few seconds. Repeat 8 to 12 times. 2. Push backward (extend): Stand with your back flat against a wall. Your upper arm should be against the wall, with your elbow bent 90 degrees (your hand straight ahead). Push your elbow gently back against the wall with about 25% to 50% of your strength. Don't let your body move forward as you push. Hold for about 6 seconds. Relax for a few seconds. Repeat 8 to 12 times. Scapular exercise: Wall push-ups   This exercise is best done with your fingers somewhat turned out, rather than straight up and down. 1. Stand facing a wall, about 12 inches to 18 inches away. 2. Place your hands on the wall at shoulder height. 3. Slowly bend your elbows and bring your face to the wall. Keep your back and hips straight. 4. Push back to where you started. 5. Repeat 8 to 12 times. 6. When you can do this exercise against a wall comfortably, you can try it against a counter. You can then slowly progress to the end of a couch, then to a sturdy chair, and finally to the floor. Scapular exercise: Retraction   For this exercise, you will need elastic exercise material, such as surgical tubing or Thera-Band. 1. Put the band around a solid object at about waist level. (A bedpost will work well.) Each hand should hold an end of the band. 2. With your elbows at your sides and bent to 90 degrees, pull the band back. Your shoulder blades should move toward each other. Then move your arms back where you started. 3. Repeat 8 to 12 times. 4. If you have good range of motion in your shoulders, try this exercise with your arms lifted out to the sides. Keep your elbows at a 90-degree angle. Raise the elastic band up to about shoulder level. Pull the band back to move your shoulder blades toward each other. Then move your arms back where you started. Internal rotator strengthening exercise   1. Start by tying a piece of elastic exercise material to a doorknob. You can use surgical tubing or Thera-Band. 2. Stand or sit with your shoulder relaxed and your elbow bent 90 degrees. Your upper arm should rest comfortably against your side. Squeeze a rolled towel between your elbow and your body for comfort. This will help keep your arm at your side. 3. Hold one end of the elastic band in the hand of the painful arm. 4. Slowly rotate your forearm toward your body until it touches your belly. Slowly move it back to where you started. 5. Keep your elbow and upper arm firmly tucked against the towel roll or at your side. 6. Repeat 8 to 12 times. External rotator strengthening exercise   1. Start by tying a piece of elastic exercise material to a doorknob. You can use surgical tubing or Thera-Band. (You may also hold one end of the band in each hand.)  2. Stand or sit with your shoulder relaxed and your elbow bent 90 degrees. Your upper arm should rest comfortably against your side. Squeeze a rolled towel between your elbow and your body for comfort. This will help keep your arm at your side. 3. Hold one end of the elastic band with the hand of the painful arm. 4. Start with your forearm across your belly. Slowly rotate the forearm out away from your body. Keep your elbow and upper arm tucked against the towel roll or the side of your body until you begin to feel tightness in your shoulder. Slowly move your arm back to where you started. 5. Repeat 8 to 12 times. Follow-up care is a key part of your treatment and safety. Be sure to make and go to all appointments, and call your doctor if you are having problems. It's also a good idea to know your test results and keep a list of the medicines you take. Where can you learn more? Go to https://chpepiceweb.Careerise. org and sign in to your Resultly account. Enter Carlos Lori in the [employer] box to learn more about \"Rotator Cuff: Exercises. \"     If you do not have an account, please click on the \"Sign Up Now\" link. Current as of: March 2, 2020               Content Version: 12.6  © 2834-0737 [employer]. Care instructions adapted under license by [facility] ([facility]). If you have questions about a medical condition or this instruction, always ask your healthcare professional. [address] any warranty or liability for your use of this information. Patient Education        Shoulder Stretches: Exercises  Introduction  Here are some examples of exercises for you to try. The exercises may be suggested for a condition or for rehabilitation. Start each exercise slowly. Ease off the exercises if you start to have pain. You will be told when to start these exercises and which ones will work best for you. How to do the exercises  Shoulder stretch   1.  a doorway and place one arm against the door frame. Your elbow should be a little higher than your shoulder. 2. Relax your shoulders as you lean forward, allowing your chest and shoulder muscles to stretch. You can also turn your body slightly away from your arm to stretch the muscles even more. 3. Hold for 15 to 30 seconds. 4. Repeat 2 to 4 times with each arm. Shoulder and chest stretch   1. Shoulder and chest stretch  2. While sitting, relax your upper body so you slump slightly in your chair. 3. As you breathe in, straighten your back and open your arms out to the sides. 4. Gently pull your shoulder blades back and downward. 5. Hold for 15 to 30 seconds as your breathe normally. 6. Repeat 2 to 4 times. Overhead stretch   1. Reach up over your head with both arms. 2. Hold for 15 to 30 seconds. 3. Repeat 2 to 4 times. Follow-up care is a key part of your treatment and safety. Be sure to make and go to all appointments, and call your doctor if you are having problems. It's also a good idea to know your test results and keep a list of the medicines you take. Where can you learn more? Go to https://chpepiceweb.[email or web address]. org and sign in to your [employer] account. Enter S254 in the [employer] box to learn more about \"Shoulder Stretches: Exercises. \"     If you do not have an account, please click on the \"Sign Up Now\" link. Current as of: March 2, 2020               Content Version: 12.6  © 2006-2020 [employer]. Care instructions adapted under license by [facility] ([facility]). If you have questions about a medical condition or this instruction, always ask your healthcare professional. [place] [address] any warranty or liability for your use of this information. Patient Education        Frozen Shoulder: Exercises  Introduction  Here are some examples of exercises for you to try. The exercises may be suggested for a condition or for rehabilitation. Start each exercise slowly. Ease off the exercises if you start to have pain. You will be told when to start these exercises and which ones will work best for you. How to do the exercises  Neck stretches   1. Look straight ahead, and tip your right ear to your right shoulder. Do not let your left shoulder rise up as you tip your head to the right. 2. Hold 15 to 30 seconds. 3. Tilt your head to the left. Do not let your right shoulder rise up as you tip your head to the left. 4. Hold for 15 to 30 seconds. 5. Repeat 2 to 4 times to each side.     Shoulder rolls 1. Sit comfortably with your feet shoulder-width apart. You can also do this exercise while standing. 2. Roll your shoulders up, then back, and then down in a smooth, circular motion. 3. Repeat 2 to 4 times. Shoulder flexion (lying down)   For this exercise, you will need a wand. To make a wand, use a piece of PVC pipe or a broom handle with the broom removed. Make the wand about a foot wider than your shoulders. 1. Lie on your back, holding a wand with your hands. Your palms should face down as you hold the wand. Place your hands slightly wider than your shoulders. 2. Keeping your elbows straight, slowly raise your arms over your head until you feel a stretch in your shoulders, upper back, and chest.  3. Hold 15 to 30 seconds. 4. Repeat 2 to 4 times. Shoulder rotation (lying down)   To make a wand, use a piece of PVC pipe or a broom handle with the broom removed. Make the wand about a foot wider than your shoulders. 1. Lie on your back and hold a wand in both hands with your elbows bent and your palms up. 2. Keeping your elbows close to your body, move the wand across your body toward the arm that has pain. 3. Hold for 15 to 30 seconds. 4. Repeat 2 to 4 times. Shoulder internal rotation with towel   1. Roll up a towel lengthwise. Hold the towel above and behind your head with the arm that is not sore. 2. With your sore arm, reach behind your back and grasp the towel. 3. Using the arm above your head, pull the towel upward until you feel a stretch on the front and outside of your sore shoulder. 4. Hold 15 to 30 seconds. 5. Relax and move the towel back down to the starting position. 6. Repeat 2 to 4 times. Shoulder blade squeeze   1. While standing with your arms at your sides, squeeze your shoulder blades together. Do not raise your shoulders up as you are squeezing. 2. Hold for 6 seconds. 3. Repeat 8 to 12 times.

## 2020-12-14 NOTE — PROGRESS NOTES
S: 62 y.o. female presents today for Shoulder Pain (right chronic ) and Drug / Alcohol Assessment (score is 8 in a program already )    R should pain f/u:  Also has l should pain  Pain in the R shoulder is chronic, achy pain  Failed NSAIDs and tylenol in the past  Xray in past overall normal  Had a steroid injection 1/20 with improvement in symptoms for 5 months  Pain 2/10 at rest; 8/10 with arm elevation or cross body  1 month ago with numbness and tingling in R hand  Interested in stretches and exercises but not currently due to PT  No swelling or redness; trauma or inciting event    L shoulder pain  Started 3 months ago  Achy pain; much better than R shoulder  No decreased ROM or limitations to activities from the L shoulder    Depression: on zoloft; controlled; PHQ 2; would like to cut back on zoloft    O: VS: /83   Pulse 76   Temp 98.5 °F (36.9 °C) (Temporal)   Resp 16   Ht 5' 3\" (1.6 m)   Wt 157 lb (71.2 kg)   LMP 09/08/2011   SpO2 98%   BMI 27.81 kg/m²   AAO/NAD, appropriate affect for mood  CV:  RRR, no murmur  Resp: CTAB  MSK: R shoulder with +cassidy and neers; exam on the R limited by pain; L shoulder exam normal;  Neg tinels    R shoulder injection performed today. I was present during entire procedure. Assessment/Plan:   1) Chronic R shoulder pain - joint injection today; shoulder exercises  2) L shoulder pain - shoulder exercises   3) Depression - reassess weaning zoloft in future. 4) HM - as ordered; labs as ordered    RTO: Return in about 2 weeks (around 12/28/2020) for well woman exam.    Attending Physician Statement  I have discussed the case, including pertinent history and exam findings with the resident. I also have seen the patient and performed key portions of the examination. I agree with the documented assessment and plan.       Electronically signed by Christiane Mariee MD on 12/15/2020 at 8:24 AM

## 2020-12-14 NOTE — PROGRESS NOTES
CC: Shoulder pain. HPI:  62 y.o. woman presents for shoulder pain. Right Shoulder Pain-chronic issue. Worsening over the last 5 months. 2/10 at rest. Achy. With movement pain increases to 8/10. Evading movements include stirring a pot with right hand, or lifting right arm over her head to style her hair. 1 month ago noticed numbness/tingling on the lateral aspect of her right arm when she sleeps. Shaking the hand makes it feel better. Writes often. Is right-handed. Trial of acetaminophen, ibuprofen in the past.  No relief. External rotation like hand on head styling it, and also with stirring a pot. Associated symptoms decreased ROM, stiffness. Denies fevers, inciting event, trauma. Last seen January 2020 for right-sided shoulder pain, was given a Kenalog shot at this time and this lasted for approximately 5 months with full relief of shoulder pain. She is willing to go to physical therapy, but at this time due to COVID-19 is not going to go, willing to do home stretches. Left shoulder pain3 months onset. Nature of pain achy, similar to right shoulder. Feels ROM is good in left shoulder, pain is more bearable and less. She is able to move her left arm with ease and without pain during the day. No trauma, falls, inciting event    She has history of major depressive disorder, and is compliant with taking Zoloft daily. Requests that if she can stop taking Zoloft today, is willing to rediscuss this at a future visit. Did go to counseling in the past, is not willing to try counseling again.       Patient Active Problem List    Diagnosis Date Noted    Diarrhea 06/21/2017    Antral gastritis     Diarrhea due to drug     Chronic GERD 02/08/2017    Mixed hyperlipidemia 02/17/2016    HTN (hypertension) 06/16/2015    Smoking 06/16/2015    Seizure ([facility] [address] 09/18/2011    Hypokalemia 09/18/2011    Common bile duct dilatation 09/18/2011    Seizures ([facility] [address] 09/17/2011 Comments: Right shoulderpositive Villegas, positive Neer. Unable to perform MARTHA test due to pain. Left shoulder-negative Villegas, Neer, MARTHA, no tenderness to palpation, ROM normal.    Bilateral handsnegative Tinel sign, negative Phalen sign.  strength normal   Neurological: She is alert. Skin: Skin is warm and dry. No rash noted. Psychiatric: She has a normal mood and affect. Her behavior is normal.   Vitals reviewed. Injection Procedure Note    Pre-procedure Diagnosis: Right shoulder pain, likely rotator cuff related. Post-procedure Diagnosis: same    Indications: symptomatic relief    Anesthesia: Topical cooling spray    Procedure Details     Informed consent was obtained for the procedure. The joint was prepped with alcohol swabs, iodine . A 25 gauge needle was inserted 4  ml of 2 % lidocaine injected and 1 ml of triamcinolone (KENALOG) 40mg/ml was then injected into the joint through the same needle. The needle was removed and the area cleansed and dressed. Complications:  None; patient tolerated the procedure well. Sweetie Palacio  12/14/2020        Assessment:     Diagnosis Orders   1. Chronic right shoulder pain  LIPID PANEL    COMPREHENSIVE METABOLIC PANEL    lidocaine 1 % injection 4 mL    triamcinolone acetonide (KENALOG-40) injection 40 mg   2. Left shoulder pain, unspecified chronicity   shoulder stretches, she is advised to also do the same stretches given on the handout for her right shoulder pain. Ice, elevation   3. Needs flu shot  INFLUENZA, QUADV, 3 YRS AND OLDER, IM PF, PREFILL SYR OR SDV, 0.5ML (AFLURIA QUADV, PF)   4. Encounter for screening for malignant neoplasm of breast, unspecified screening modality  [facility] CAD SCREENING         Plans:  As above. RTO in 2 weeks for cervical cancer screening, discuss Pneumococcal shot. Please see Patient Instructions for further counseling and information given. Advised to please be adherent to the treatment plans discussed today, and please call with any questions or concerns, letting the office know of any reasons that the plans may not be followed. The risks of untreated conditions include worsening illness, injury, disability, and possibly, death. Please call if symptoms change in any way, worsen, or fail to completely resolve, as this could necessitate a change to treatment plans. Patient and/or caregiver expressed understanding. Indications and proper use of medication(s) reviewed. Potential side-effects and risks of medication(s) also explained. Patient and/or caregiver was instructed to call if any new symptoms develop prior to next visit. Health risk factors discussed and addressed.

## 2020-12-14 NOTE — PROGRESS NOTES
Vaccine Information Sheet, \"Influenza - Inactivated\"  given to Gillian Molina, or parent/legal guardian of  Gillian Molian and verbalized understanding. Patient responses:    Have you ever had a reaction to a flu vaccine? No  Do you have any current illness? No  Have you ever had Guillian Topping Syndrome? No  Do you have a serious allergy to any of the follow: Neomycin, Polymyxin, Thimerosal, eggs or egg products? No    Flu vaccine given per order. Please see immunization tab. Risks and benefits explained. Current VIS given.

## 2020-12-28 ENCOUNTER — OFFICE VISIT (OUTPATIENT)
Dept: FAMILY MEDICINE CLINIC | Age: 60
End: 2020-12-28
Payer: COMMERCIAL

## 2020-12-28 VITALS
DIASTOLIC BLOOD PRESSURE: 68 MMHG | BODY MASS INDEX: 27.11 KG/M2 | RESPIRATION RATE: 12 BRPM | HEART RATE: 77 BPM | WEIGHT: 153 LBS | OXYGEN SATURATION: 98 % | HEIGHT: 63 IN | TEMPERATURE: 98 F | SYSTOLIC BLOOD PRESSURE: 139 MMHG

## 2020-12-28 PROCEDURE — 99386 PREV VISIT NEW AGE 40-64: CPT | Performed by: STUDENT IN AN ORGANIZED HEALTH CARE EDUCATION/TRAINING PROGRAM

## 2020-12-28 PROCEDURE — 99213 OFFICE O/P EST LOW 20 MIN: CPT | Performed by: STUDENT IN AN ORGANIZED HEALTH CARE EDUCATION/TRAINING PROGRAM

## 2020-12-28 PROCEDURE — G8482 FLU IMMUNIZE ORDER/ADMIN: HCPCS | Performed by: STUDENT IN AN ORGANIZED HEALTH CARE EDUCATION/TRAINING PROGRAM

## 2020-12-28 NOTE — PROGRESS NOTES
pCC:  Well Woman Exam    HPI:  62 y.o. female presents for well woman exam    Age of menarche 16years old  Duration of menses was 5 days  No menses currently, stopped around age 62  No history of heavy bleeds, prolonged bleeds  She has no spotting, vaginal discharge, pelvic pain currently. Vaginal dryness, tries a lubricant OTC does help  +decreased libido    2 live births. Ectopic pregnancy . Surgicaltubal ligation    Surgicalbreast implants [id number]    Sexually active with one partner, does not use condoms. Patient's . Remote history of chlamydia in the 1980s. Mammogram more than 3 years ago. Concerned about getting it done again, feels it may alter the shape of her implants. No known family history of breast cancer, cervical cancer or ovarian cancer.     Patient Active Problem List    Diagnosis Date Noted    Right shoulder pain 2020    Depression 2020    Diarrhea 2017    Antral gastritis     Diarrhea due to drug     Chronic GERD 2017    Mixed hyperlipidemia 2016    HTN (hypertension) 2015    Smoking 2015    Seizure ([address] 2011    Hypokalemia 2011    Common bile duct dilatation 2011    Seizures ([address] 2011    Convulsions/seizures ([address] 2011    Alcohol withdrawal ([address] 2011    Alcoholism ([address] 2011       Past Medical History:   Diagnosis Date    Alcoholic ([address]     Anxiety     Chronic right shoulder pain     Convulsions/seizures ([address] 2011    Depression     Depression 2020    GERD (gastroesophageal reflux disease)     Headache     HTN (hypertension) 2015    Mixed hyperlipidemia 2016    Osteoarthritis     Psychiatric problem     Substance abuse ([address]     methadone       Current Outpatient Medications on File Prior to Visit   Medication Sig Dispense Refill    sertraline (ZOLOFT) 100 MG tablet TAKE 1 TABLET BY MOUTH ONCE EVERY DAY 30 tablet 3  omeprazole (PRILOSEC) 20 MG delayed release capsule Take 1 capsule by mouth daily 30 capsule 3    atorvastatin (LIPITOR) 10 MG tablet Take 1 tablet by mouth daily 30 tablet 12    hydroCHLOROthiazide (MICROZIDE) 12.5 MG capsule TAKE ONE CAPSULE BY MOUTH ONCE A  DAY 30 capsule 11     No current facility-administered medications on file prior to visit. No Known Allergies    Family History   Problem Relation Age of Onset    Diabetes Mother     Kidney Disease Mother     Stroke Mother     High Cholesterol Mother     Arthritis Mother     High Blood Pressure Mother     Obesity Mother     Diabetes Father     Arthritis Father     High Blood Pressure Father     High Cholesterol Father     Obesity Father     Other Father     Substance Abuse Son        Past Surgical History:   Procedure Laterality Date    BREAST SURGERY  1988    augmentation silicone    ECTOPIC PREGNANCY SURGERY      TUBAL LIGATION  01/01/1993       Social History     Tobacco Use    Smoking status: Current Every Day Smoker     Packs/day: 0.50     Years: 15.00     Pack years: 7.50     Types: Cigarettes     Start date: 1991    Smokeless tobacco: Never Used   Substance Use Topics    Alcohol use: No     Alcohol/week: 0.0 standard drinks     Comment: recovering alcoholic    Drug use: No     Comment: recovering addict       ROS:    Review of Systems   Constitutional: Negative for activity change, appetite change, chills and fatigue. HENT: Negative for congestion and sore throat. Respiratory: Negative for choking and chest tightness. Cardiovascular: Negative for chest pain, palpitations and leg swelling. Gastrointestinal: Negative for abdominal distention and abdominal pain. Genitourinary: Negative for difficulty urinating and dysuria. Musculoskeletal: Negative for arthralgias and back pain. Skin: Negative for color change and pallor. Neurological: Negative for facial asymmetry and headaches. Psychiatric/Behavioral: Negative for behavioral problems. The patient is not nervous/anxious. Objective:    VS:  Blood pressure 139/68, pulse 77, temperature 98 °F (36.7 °C), temperature source Temporal, resp. rate 12, height 5' 3\" (1.6 m), weight 153 lb (69.4 kg), last menstrual period 09/08/2011, SpO2 98 %, not currently breastfeeding. Physical Exam   Constitutional: She is oriented to person, place, and time. She appears well-developed and well-nourished. Cardiovascular: Normal rate and regular rhythm. Exam reveals no friction rub. No murmur heard. Pulmonary/Chest: Effort normal and breath sounds normal. She has no wheezes. She has no rales. No breast swelling, tenderness, discharge or bleeding. Abdominal: Soft. Bowel sounds are normal. She exhibits no distension. There is no abdominal tenderness. Genitourinary:    Uterus normal.   There is no rash, tenderness or lesion on the right labia. There is no rash, tenderness or lesion on the left labia. Uterus is not fixed and not tender. Cervix exhibits no motion tenderness, no discharge and no friability. Right adnexum displays no mass. Left adnexum displays no mass. No vaginal discharge, erythema, tenderness or bleeding. No erythema, tenderness or bleeding in the vagina. No foreign body in the vagina. No signs of injury in the vagina. Genitourinary Comments: Silicone breast implants palpated. Neurological: She is alert and oriented to person, place, and time. Skin: Skin is warm and dry. No rash noted. No pallor. Assessment:     Diagnosis Orders   1. Well woman exam  PAP SMEAR    CECY DIGITAL DIAGNOSTIC W OR WO CAD BILATERAL    C.trachomatis N.gonorrhoeae DNA, Thin Prep         Plans:  As above. RTO sooner PRN    Please see Patient Instructions for further counseling and information given. Advised to please be adherent to the treatment plans discussed today, and please call with any questions or concerns, letting the office know of any reasons that the plans may not be followed. The risks of untreated conditions include worsening illness, injury, disability, and possibly, death. Please call if symptoms change in any way, worsen, or fail to completely resolve, as this could necessitate a change to treatment plans. Patient and/or caregiver expressed understanding. Indications and proper use of medication(s) reviewed. Potential side-effects and risks of medication(s) also explained. Patient and/or caregiver was instructed to call if any new symptoms develop prior to next visit. Health risk factors discussed and addressed.

## 2020-12-31 LAB
CHLAMYDIA BY THIN PREP: NEGATIVE
N. GONORRHOEAE DNA, THIN PREP: NEGATIVE
SOURCE: NORMAL

## 2021-02-19 RX ORDER — OMEPRAZOLE 20 MG/1
20 CAPSULE, DELAYED RELEASE ORAL DAILY
Qty: 30 CAPSULE | Refills: 3 | Status: SHIPPED
Start: 2021-02-19 | End: 2021-06-09 | Stop reason: SDUPTHER

## 2021-02-19 NOTE — TELEPHONE ENCOUNTER
Last Appointment:  3/13/2019  Future Appointments   Date Time Provider Dandre Onofre   3/31/2021 11:00 AM MD Edy Truong [facility]

## 2021-03-04 DIAGNOSIS — Z13.31 POSITIVE DEPRESSION SCREENING: ICD-10-CM

## 2021-03-04 DIAGNOSIS — F33.9 RECURRENT MAJOR DEPRESSIVE DISORDER, REMISSION STATUS UNSPECIFIED (HCC): ICD-10-CM

## 2021-03-04 RX ORDER — SERTRALINE HYDROCHLORIDE 100 MG/1
TABLET, FILM COATED ORAL
Qty: 30 TABLET | Refills: 3 | Status: SHIPPED
Start: 2021-03-04 | End: 2021-06-09 | Stop reason: SDUPTHER

## 2021-03-04 NOTE — TELEPHONE ENCOUNTER
Last Appointment:  3/13/2019  Future Appointments   Date Time Provider Dandre Onofre   3/31/2021 11:00 AM MD Sonu Sanford [facility]

## 2021-03-05 ENCOUNTER — TELEPHONE (OUTPATIENT)
Dept: FAMILY MEDICINE CLINIC | Age: 61
End: 2021-03-05

## 2021-03-05 DIAGNOSIS — H91.91 DECREASED HEARING, RIGHT: Primary | ICD-10-CM

## 2021-03-05 NOTE — TELEPHONE ENCOUNTER
Nataliia Beltran called in and is stating that she has been having demmished hearing in her right ear lately. She is asking if you could possibly send her to audiology for a hearing evaluation.   Thank you

## 2021-03-05 NOTE — TELEPHONE ENCOUNTER
Please tell her we made a referral, and to let us know if she does not get a call within a few days. Thanks.

## 2021-03-22 ENCOUNTER — PROCEDURE VISIT (OUTPATIENT)
Dept: AUDIOLOGY | Age: 61
End: 2021-03-22
Payer: COMMERCIAL

## 2021-03-22 DIAGNOSIS — H90.A21 SENSORINEURAL HEARING LOSS (SNHL) OF RIGHT EAR WITH RESTRICTED HEARING OF LEFT EAR: Primary | ICD-10-CM

## 2021-03-22 DIAGNOSIS — H92.09 OTALGIA, UNSPECIFIED LATERALITY: ICD-10-CM

## 2021-03-22 DIAGNOSIS — H93.13 TINNITUS OF BOTH EARS: ICD-10-CM

## 2021-03-22 PROCEDURE — 92567 TYMPANOMETRY: CPT | Performed by: AUDIOLOGIST

## 2021-03-22 PROCEDURE — 92557 COMPREHENSIVE HEARING TEST: CPT | Performed by: AUDIOLOGIST

## 2021-03-22 NOTE — PROGRESS NOTES
This patient was referred for audiometric/tympanometric testing by Dr. Melinda Tong due to hearing loss and tinnitus, worse on the right. She reported gradual onset hearing loss and tinnitus for several years. She also reported infrequent ear pain. She denied any dizziness. Audiometry revealed a mild sloping to severe sensorineural hearing loss, in the right ear and hearing sensitivity within normal limits through 4000 Hz sloping to a mild sensorineural hearing loss, in the left ear. Reliability was good. Speech reception thresholds were in good agreement with the pure tone averages, bilaterally. Speech discrimination scores were excellent, bilaterally. Asymmetries noted throughout frequency range, right ear worse. Tympanometry revealed normal middle ear peak pressure and compliance, bilaterally. The results were reviewed with the patient. Referral with ENT recommended for cerumen removal and to discuss asymmetrical hearing loss. Recommendations for follow up will be made pending physician consult.     Lyndon Izquierdo [facility]  [address] [id number]  Electronically signed by Lyndon Izquierdo on 3/23/2021 at 7:32 AM

## 2021-04-01 ENCOUNTER — TELEPHONE (OUTPATIENT)
Dept: ENT CLINIC | Age: 61
End: 2021-04-01

## 2021-04-13 ENCOUNTER — OFFICE VISIT (OUTPATIENT)
Dept: ENT CLINIC | Age: 61
End: 2021-04-13
Payer: COMMERCIAL

## 2021-04-13 VITALS
HEIGHT: 63 IN | SYSTOLIC BLOOD PRESSURE: 135 MMHG | DIASTOLIC BLOOD PRESSURE: 78 MMHG | WEIGHT: 153 LBS | BODY MASS INDEX: 27.11 KG/M2 | HEART RATE: 68 BPM

## 2021-04-13 DIAGNOSIS — H61.22 IMPACTED CERUMEN OF LEFT EAR: Primary | ICD-10-CM

## 2021-04-13 DIAGNOSIS — H93.11 TINNITUS OF RIGHT EAR: ICD-10-CM

## 2021-04-13 DIAGNOSIS — R42 DISEQUILIBRIUM: ICD-10-CM

## 2021-04-13 DIAGNOSIS — H90.3 SENSORINEURAL HEARING LOSS (SNHL) OF BOTH EARS: ICD-10-CM

## 2021-04-13 PROCEDURE — 69210 REMOVE IMPACTED EAR WAX UNI: CPT | Performed by: NURSE PRACTITIONER

## 2021-04-13 PROCEDURE — 3017F COLORECTAL CA SCREEN DOC REV: CPT | Performed by: NURSE PRACTITIONER

## 2021-04-13 PROCEDURE — 99203 OFFICE O/P NEW LOW 30 MIN: CPT | Performed by: NURSE PRACTITIONER

## 2021-04-13 PROCEDURE — G8427 DOCREV CUR MEDS BY ELIG CLIN: HCPCS | Performed by: NURSE PRACTITIONER

## 2021-04-13 PROCEDURE — 4004F PT TOBACCO SCREEN RCVD TLK: CPT | Performed by: NURSE PRACTITIONER

## 2021-04-13 PROCEDURE — G8419 CALC BMI OUT NRM PARAM NOF/U: HCPCS | Performed by: NURSE PRACTITIONER

## 2021-04-13 RX ORDER — M-VIT,TX,IRON,MINS/CALC/FOLIC 27MG-0.4MG
1 TABLET ORAL DAILY
COMMUNITY
End: 2021-06-16

## 2021-04-13 NOTE — PROGRESS NOTES
[facility] Otolaryngology  Dr. Tommy Erickson. MIK Kaminski Ms.Ed. New Consult       Patient Name:  Jesus Limon  :  1960     CHIEF C/O:    Chief Complaint   Patient presents with    Hearing Loss     had a hearing test and right ear is different than the other       HISTORY OBTAINED FROM:  patient    HISTORY OF PRESENT ILLNESS:       Yong Cody is a 62y.o. year old female, here today for hearing loss and cerumen impaction. She states that her right ear has felt clogged for approximately 3 months there was a gradual problem. She denies any current pain or drainage from the ear. She denies any significant history of previous cerumen impactions. She denies any previously noticed hearing loss. She does have a family history of hearing loss with no significant noise exposure. She does complain of a constant high-frequency nonpulsatile tinnitus in the right ear. She also complains of mild off-balance sensation that can last for 1 to 2 minutes if she sleeps on her right side. She denies any room spinning vertigo. She denies any significant symptoms when she is not laying in bed.           Past Medical History:   Diagnosis Date    Alcoholic ([address]     Anxiety     Chronic right shoulder pain     [facility]ulsions[facility]seizures [facility] 2011    Depression     Depression 2020    GERD (gastroesophageal reflux disease)     Headache     HTN (hypertension) 2015    Mixed hyperlipidemia 2016    Osteoarthritis     Psychiatric problem     Substance abuse ([facility]     methadone     Past Surgical History:   Procedure Laterality Date    BREAST SURGERY      augmentation silicone    ECTOPIC PREGNANCY SURGERY      TUBAL LIGATION  1993       Current Outpatient Medications:     Multiple Vitamins-Minerals (THERAPEUTIC MULTIVITAMIN-MINERALS) tablet, Take 1 tablet by mouth daily, Disp: , Rfl:     sertraline (ZOLOFT) 100 MG tablet, TAKE 1 TABLET BY MOUTH ONCE EVERY DAY, Disp: 30 tablet, Rfl: 3   omeprazole (PRILOSEC) 20 MG delayed release capsule, Take 1 capsule by mouth daily, Disp: 30 capsule, Rfl: 3    atorvastatin (LIPITOR) 10 MG tablet, Take 1 tablet by mouth daily, Disp: 30 tablet, Rfl: 12    hydroCHLOROthiazide (MICROZIDE) 12.5 MG capsule, TAKE ONE CAPSULE BY MOUTH ONCE A  DAY, Disp: 30 capsule, Rfl: 11  Patient has no known allergies. Social History     Tobacco Use    Smoking status: Current Every Day Smoker     Packs/day: 0.50     Years: 15.00     Pack years: 7.50     Types: Cigarettes     Start date: 12    Smokeless tobacco: Never Used   Substance Use Topics    Alcohol use: No     Alcohol/week: 0.0 standard drinks     Comment: recovering alcoholic    Drug use: No     Comment: recovering addict     Family History   Problem Relation Age of Onset    Diabetes Mother     Kidney Disease Mother     Stroke Mother     High Cholesterol Mother     Arthritis Mother     High Blood Pressure Mother     Obesity Mother     Diabetes Father     Arthritis Father     High Blood Pressure Father     High Cholesterol Father     Obesity Father     Other Father     Substance Abuse Son        Review of Systems   Constitutional: Negative. Negative for activity change and appetite change. HENT: Positive for hearing loss and tinnitus. Eyes: Negative. Respiratory: Negative. Negative for shortness of breath and stridor. Cardiovascular: Negative. Negative for chest pain and palpitations. Endocrine: Negative. Musculoskeletal: Negative. Skin: Negative. Neurological: Positive for dizziness. Hematological: Negative. Psychiatric/Behavioral: Negative. /78 (Site: Right Upper Arm, Position: Sitting, Cuff Size: Medium Adult)   Pulse 68   Ht 5' 3\" (1.6 m)   Wt 153 lb (69.4 kg)   LMP 09/08/2011   BMI 27.10 kg/m²   Physical Exam  Constitutional:       Appearance: Normal appearance. HENT:      Head: Normocephalic.       Right Ear: Tympanic membrane, ear canal and external ear normal. Decreased hearing noted. Left Ear: Tympanic membrane, ear canal and external ear normal. Decreased hearing noted. There is impacted cerumen. Nose: Nose normal. No rhinorrhea. Right Turbinates: Not pale. Left Turbinates: Not pale. Mouth/Throat:      Lips: Pink. Mouth: Mucous membranes are moist.      Pharynx: Oropharynx is clear. Eyes:      Conjunctiva/sclera: Conjunctivae normal.      Pupils: Pupils are equal, round, and reactive to light. Neck:      Musculoskeletal: Normal range of motion. No neck rigidity or muscular tenderness. Cardiovascular:      Rate and Rhythm: Normal rate and regular rhythm. Pulses: Normal pulses. Pulmonary:      Effort: Pulmonary effort is normal. No respiratory distress. Breath sounds: No stridor. Musculoskeletal: Normal range of motion. Skin:     General: Skin is warm and dry. Neurological:      General: No focal deficit present. Mental Status: She is alert and oriented to person, place, and time. Psychiatric:         Mood and Affect: Mood normal.         Behavior: Behavior normal.         Thought Content: Thought content normal.         Judgment: Judgment normal.           Audiogram and tympanogram reviewed with patient. Audiogram reveals 40 dB hearing loss in the right ear with 100% discrimination at 75 dB, 25 dB hearing loss in the left ear with 100% discrimination at 65 dB. Tympanogram reveals type a curves bilaterally. There is mild separation between the right and left ear with increased loss in the right ear. The right ear does maintain a similar pattern to the left. IMPRESSION/PLAN:    Cerumen removal     Auditory canal(s) left ear partially obstructed with cerumen. Cerumen was gently removed using soft plastic curette, gentle irrigation. Tympanic membranes are intact following the procedure.  Auditory canals appear normal.        [place] hallpike:  LEFT: (negative)   RIGHT: (negative)    [place] was not

## 2021-05-03 ASSESSMENT — ENCOUNTER SYMPTOMS
SHORTNESS OF BREATH: 0
RESPIRATORY NEGATIVE: 1
EYES NEGATIVE: 1
STRIDOR: 0

## 2021-06-09 ENCOUNTER — OFFICE VISIT (OUTPATIENT)
Dept: FAMILY MEDICINE CLINIC | Age: 61
End: 2021-06-09
Payer: COMMERCIAL

## 2021-06-09 VITALS
HEART RATE: 80 BPM | OXYGEN SATURATION: 98 % | HEIGHT: 64 IN | WEIGHT: 157 LBS | TEMPERATURE: 98 F | BODY MASS INDEX: 26.8 KG/M2 | SYSTOLIC BLOOD PRESSURE: 119 MMHG | DIASTOLIC BLOOD PRESSURE: 72 MMHG

## 2021-06-09 DIAGNOSIS — R56.9 SEIZURE (HCC): ICD-10-CM

## 2021-06-09 DIAGNOSIS — R19.7 DIARRHEA, UNSPECIFIED TYPE: ICD-10-CM

## 2021-06-09 DIAGNOSIS — F33.9 RECURRENT MAJOR DEPRESSIVE DISORDER, REMISSION STATUS UNSPECIFIED (HCC): ICD-10-CM

## 2021-06-09 DIAGNOSIS — R19.7 DIARRHEA, UNSPECIFIED TYPE: Primary | ICD-10-CM

## 2021-06-09 DIAGNOSIS — R10.9 FLANK PAIN: ICD-10-CM

## 2021-06-09 DIAGNOSIS — Z13.31 POSITIVE DEPRESSION SCREENING: ICD-10-CM

## 2021-06-09 LAB
ALBUMIN SERPL-MCNC: 4.4 G/DL (ref 3.5–5.2)
ALP BLD-CCNC: 178 U/L (ref 35–104)
ALT SERPL-CCNC: 17 U/L (ref 0–32)
ANION GAP SERPL CALCULATED.3IONS-SCNC: 19 MMOL/L (ref 7–16)
AST SERPL-CCNC: 33 U/L (ref 0–31)
BASOPHILS ABSOLUTE: 0.05 E9/L (ref 0–0.2)
BASOPHILS RELATIVE PERCENT: 0.5 % (ref 0–2)
BILIRUB SERPL-MCNC: 0.3 MG/DL (ref 0–1.2)
BUN BLDV-MCNC: 11 MG/DL (ref 6–20)
CALCIUM SERPL-MCNC: 10 MG/DL (ref 8.6–10.2)
CHLORIDE BLD-SCNC: 99 MMOL/L (ref 98–107)
CO2: 23 MMOL/L (ref 22–29)
CREAT SERPL-MCNC: 0.6 MG/DL (ref 0.5–1)
EOSINOPHILS ABSOLUTE: 0.05 E9/L (ref 0.05–0.5)
EOSINOPHILS RELATIVE PERCENT: 0.5 % (ref 0–6)
GFR AFRICAN AMERICAN: >60
GFR NON-AFRICAN AMERICAN: >60 ML/MIN/1.73
GLUCOSE BLD-MCNC: 86 MG/DL (ref 74–99)
HCT VFR BLD CALC: 41.4 % (ref 34–48)
HEMOGLOBIN: 13.3 G/DL (ref 11.5–15.5)
IMMATURE GRANULOCYTES #: 0.05 E9/L
IMMATURE GRANULOCYTES %: 0.5 % (ref 0–5)
LYMPHOCYTES ABSOLUTE: 2.31 E9/L (ref 1.5–4)
LYMPHOCYTES RELATIVE PERCENT: 25.3 % (ref 20–42)
MCH RBC QN AUTO: 31.6 PG (ref 26–35)
MCHC RBC AUTO-ENTMCNC: 32.1 % (ref 32–34.5)
MCV RBC AUTO: 98.3 FL (ref 80–99.9)
MONOCYTES ABSOLUTE: 0.71 E9/L (ref 0.1–0.95)
MONOCYTES RELATIVE PERCENT: 7.8 % (ref 2–12)
NEUTROPHILS ABSOLUTE: 5.95 E9/L (ref 1.8–7.3)
NEUTROPHILS RELATIVE PERCENT: 65.4 % (ref 43–80)
PDW BLD-RTO: 13.1 FL (ref 11.5–15)
PLATELET # BLD: 343 E9/L (ref 130–450)
PMV BLD AUTO: 10.1 FL (ref 7–12)
POTASSIUM SERPL-SCNC: 3.7 MMOL/L (ref 3.5–5)
RBC # BLD: 4.21 E12/L (ref 3.5–5.5)
SODIUM BLD-SCNC: 141 MMOL/L (ref 132–146)
TOTAL PROTEIN: 7.8 G/DL (ref 6.4–8.3)
TSH SERPL DL<=0.05 MIU/L-ACNC: 0.92 UIU/ML (ref 0.27–4.2)
WBC # BLD: 9.1 E9/L (ref 4.5–11.5)

## 2021-06-09 PROCEDURE — G8427 DOCREV CUR MEDS BY ELIG CLIN: HCPCS | Performed by: FAMILY MEDICINE

## 2021-06-09 PROCEDURE — G8419 CALC BMI OUT NRM PARAM NOF/U: HCPCS | Performed by: FAMILY MEDICINE

## 2021-06-09 PROCEDURE — 3017F COLORECTAL CA SCREEN DOC REV: CPT | Performed by: FAMILY MEDICINE

## 2021-06-09 PROCEDURE — 36415 COLL VENOUS BLD VENIPUNCTURE: CPT | Performed by: FAMILY MEDICINE

## 2021-06-09 PROCEDURE — 99214 OFFICE O/P EST MOD 30 MIN: CPT | Performed by: FAMILY MEDICINE

## 2021-06-09 PROCEDURE — 4004F PT TOBACCO SCREEN RCVD TLK: CPT | Performed by: FAMILY MEDICINE

## 2021-06-09 PROCEDURE — 99212 OFFICE O/P EST SF 10 MIN: CPT | Performed by: FAMILY MEDICINE

## 2021-06-09 RX ORDER — FERROUS SULFATE 325(65) MG
325 TABLET ORAL
COMMUNITY
End: 2021-08-06

## 2021-06-09 RX ORDER — SERTRALINE HYDROCHLORIDE 100 MG/1
TABLET, FILM COATED ORAL
Qty: 30 TABLET | Refills: 3 | Status: SHIPPED | OUTPATIENT
Start: 2021-06-09 | End: 2022-01-18

## 2021-06-09 RX ORDER — OMEPRAZOLE 20 MG/1
20 CAPSULE, DELAYED RELEASE ORAL DAILY
Qty: 30 CAPSULE | Refills: 3 | Status: SHIPPED | OUTPATIENT
Start: 2021-06-09 | End: 2021-11-10 | Stop reason: SDUPTHER

## 2021-06-09 RX ORDER — DICYCLOMINE HYDROCHLORIDE 10 MG/1
10 CAPSULE ORAL 4 TIMES DAILY
Qty: 120 CAPSULE | Refills: 3 | Status: SHIPPED
Start: 2021-06-09 | End: 2021-06-16

## 2021-06-09 NOTE — PATIENT INSTRUCTIONS
Avoid preservatives in yogurt, such as sorbic acid, potassium sorbate, and sorbitol. Use Bentyl and probiotics, including yogurt.

## 2021-06-09 NOTE — PROGRESS NOTES
(IRON 325) 325 (65 Fe) MG tablet Take 325 mg by mouth daily (with breakfast)      sertraline (ZOLOFT) 100 MG tablet TAKE 1 TABLET BY MOUTH ONCE EVERY DAY 30 tablet 3    omeprazole (PRILOSEC) 20 MG delayed release capsule Take 1 capsule by mouth daily 30 capsule 3    dicyclomine (BENTYL) 10 MG capsule Take 1 capsule by mouth 4 times daily 120 capsule 3    Multiple Vitamins-Minerals (THERAPEUTIC MULTIVITAMIN-MINERALS) tablet Take 1 tablet by mouth daily      atorvastatin (LIPITOR) 10 MG tablet Take 1 tablet by mouth daily 30 tablet 12    hydroCHLOROthiazide (MICROZIDE) 12.5 MG capsule TAKE ONE CAPSULE BY MOUTH ONCE A  DAY 30 capsule 11     No current facility-administered medications for this visit. Jeniffer Oropeza was seen today for flank pain and diarrhea. Diagnoses and all orders for this visit:    Diarrhea, unspecified type  -     COMPREHENSIVE METABOLIC PANEL; Future  -     CBC WITH AUTO DIFFERENTIAL; Future  -     TSH without Reflex; Future  -     US ABDOMEN COMPLETE; Future  -     US RETROPERITONEAL LIMITED; Future  -     Jacqueline Morales MD, General Surgery, [place]    Recurrent major depressive disorder, remission status unspecified ([facility]  -     sertraline (ZOLOFT) 100 MG tablet; TAKE 1 TABLET BY MOUTH ONCE EVERY DAY  -     US ABDOMEN COMPLETE; Future  -     US RETROPERITONEAL LIMITED; Future    Positive depression screening  -     sertraline (ZOLOFT) 100 MG tablet; TAKE 1 TABLET BY MOUTH ONCE EVERY DAY  -     US ABDOMEN COMPLETE; Future  -     US RETROPERITONEAL LIMITED; Future    Seizure ([facility]  -     US RETROPERITONEAL LIMITED; Future    Flank pain  -     US RETROPERITONEAL LIMITED; Future    Other orders  -     omeprazole (PRILOSEC) 20 MG delayed release capsule; Take 1 capsule by mouth daily  -     dicyclomine (BENTYL) 10 MG capsule;  Take 1 capsule by mouth 4 times daily

## 2021-06-14 ENCOUNTER — TELEPHONE (OUTPATIENT)
Dept: SURGERY | Age: 61
End: 2021-06-14

## 2021-06-16 ENCOUNTER — TELEPHONE (OUTPATIENT)
Dept: FAMILY MEDICINE CLINIC | Age: 61
End: 2021-06-16

## 2021-06-16 ENCOUNTER — APPOINTMENT (OUTPATIENT)
Dept: GENERAL RADIOLOGY | Age: 61
End: 2021-06-16
Payer: COMMERCIAL

## 2021-06-16 ENCOUNTER — APPOINTMENT (OUTPATIENT)
Dept: CT IMAGING | Age: 61
End: 2021-06-16
Payer: COMMERCIAL

## 2021-06-16 ENCOUNTER — HOSPITAL ENCOUNTER (EMERGENCY)
Age: 61
Discharge: HOME | End: 2021-06-16
Payer: COMMERCIAL

## 2021-06-16 VITALS
WEIGHT: 157 LBS | HEART RATE: 70 BPM | OXYGEN SATURATION: 97 % | RESPIRATION RATE: 16 BRPM | SYSTOLIC BLOOD PRESSURE: 118 MMHG | TEMPERATURE: 97.9 F | BODY MASS INDEX: 26.8 KG/M2 | HEIGHT: 64 IN | DIASTOLIC BLOOD PRESSURE: 70 MMHG

## 2021-06-16 DIAGNOSIS — K62.89 RECTAL MASS: ICD-10-CM

## 2021-06-16 DIAGNOSIS — R07.89 CHEST WALL PAIN: Primary | ICD-10-CM

## 2021-06-16 DIAGNOSIS — R91.8 LUNG MASS: ICD-10-CM

## 2021-06-16 DIAGNOSIS — R16.0 LIVER MASS: ICD-10-CM

## 2021-06-16 LAB
ALBUMIN SERPL-MCNC: 4.4 G/DL (ref 3.5–5.2)
ALP BLD-CCNC: 201 U/L (ref 35–104)
ALT SERPL-CCNC: 16 U/L (ref 0–32)
ANION GAP SERPL CALCULATED.3IONS-SCNC: 17 MMOL/L (ref 7–16)
AST SERPL-CCNC: 33 U/L (ref 0–31)
BASOPHILS ABSOLUTE: 0.05 E9/L (ref 0–0.2)
BASOPHILS RELATIVE PERCENT: 0.5 % (ref 0–2)
BILIRUB SERPL-MCNC: 0.2 MG/DL (ref 0–1.2)
BILIRUBIN URINE: NEGATIVE
BLOOD, URINE: NEGATIVE
BUN BLDV-MCNC: 8 MG/DL (ref 6–20)
CALCIUM SERPL-MCNC: 10.4 MG/DL (ref 8.6–10.2)
CHLORIDE BLD-SCNC: 97 MMOL/L (ref 98–107)
CLARITY: CLEAR
CO2: 25 MMOL/L (ref 22–29)
COLOR: YELLOW
CREAT SERPL-MCNC: 0.6 MG/DL (ref 0.5–1)
D DIMER: 621 NG/ML DDU
EKG ATRIAL RATE: 65 BPM
EKG P AXIS: 71 DEGREES
EKG P-R INTERVAL: 134 MS
EKG Q-T INTERVAL: 416 MS
EKG QRS DURATION: 80 MS
EKG QTC CALCULATION (BAZETT): 432 MS
EKG R AXIS: 15 DEGREES
EKG T AXIS: 43 DEGREES
EKG VENTRICULAR RATE: 65 BPM
EOSINOPHILS ABSOLUTE: 0.07 E9/L (ref 0.05–0.5)
EOSINOPHILS RELATIVE PERCENT: 0.6 % (ref 0–6)
GFR AFRICAN AMERICAN: >60
GFR NON-AFRICAN AMERICAN: >60 ML/MIN/1.73
GLUCOSE BLD-MCNC: 97 MG/DL (ref 74–99)
GLUCOSE URINE: NEGATIVE MG/DL
HCG(URINE) PREGNANCY TEST: NEGATIVE
HCT VFR BLD CALC: 37.5 % (ref 34–48)
HEMOGLOBIN: 13.1 G/DL (ref 11.5–15.5)
IMMATURE GRANULOCYTES #: 0.06 E9/L
IMMATURE GRANULOCYTES %: 0.6 % (ref 0–5)
KETONES, URINE: NEGATIVE MG/DL
LACTIC ACID: 2.4 MMOL/L (ref 0.5–2.2)
LEUKOCYTE ESTERASE, URINE: NEGATIVE
LIPASE: 42 U/L (ref 13–60)
LYMPHOCYTES ABSOLUTE: 3 E9/L (ref 1.5–4)
LYMPHOCYTES RELATIVE PERCENT: 27.8 % (ref 20–42)
MCH RBC QN AUTO: 32.6 PG (ref 26–35)
MCHC RBC AUTO-ENTMCNC: 34.9 % (ref 32–34.5)
MCV RBC AUTO: 93.3 FL (ref 80–99.9)
MONOCYTES ABSOLUTE: 0.88 E9/L (ref 0.1–0.95)
MONOCYTES RELATIVE PERCENT: 8.1 % (ref 2–12)
NEUTROPHILS ABSOLUTE: 6.74 E9/L (ref 1.8–7.3)
NEUTROPHILS RELATIVE PERCENT: 62.4 % (ref 43–80)
NITRITE, URINE: NEGATIVE
PDW BLD-RTO: 12.3 FL (ref 11.5–15)
PH UA: 6 (ref 5–9)
PLATELET # BLD: 263 E9/L (ref 130–450)
PMV BLD AUTO: 10 FL (ref 7–12)
POTASSIUM SERPL-SCNC: 3.4 MMOL/L (ref 3.5–5)
PROTEIN UA: NEGATIVE MG/DL
RBC # BLD: 4.02 E12/L (ref 3.5–5.5)
REASON FOR REJECTION: NORMAL
REJECTED TEST: NORMAL
SODIUM BLD-SCNC: 139 MMOL/L (ref 132–146)
SPECIFIC GRAVITY UA: <=1.005 (ref 1–1.03)
TOTAL PROTEIN: 7.7 G/DL (ref 6.4–8.3)
TROPONIN, HIGH SENSITIVITY: <6 NG/L (ref 0–9)
UROBILINOGEN, URINE: 0.2 E.U./DL
WBC # BLD: 10.8 E9/L (ref 4.5–11.5)

## 2021-06-16 PROCEDURE — 93005 ELECTROCARDIOGRAM TRACING: CPT | Performed by: NURSE PRACTITIONER

## 2021-06-16 PROCEDURE — 81003 URINALYSIS AUTO W/O SCOPE: CPT

## 2021-06-16 PROCEDURE — 6360000004 HC RX CONTRAST MEDICATION: Performed by: RADIOLOGY

## 2021-06-16 PROCEDURE — 84484 ASSAY OF TROPONIN QUANT: CPT

## 2021-06-16 PROCEDURE — 80053 COMPREHEN METABOLIC PANEL: CPT

## 2021-06-16 PROCEDURE — 6360000002 HC RX W HCPCS: Performed by: NURSE PRACTITIONER

## 2021-06-16 PROCEDURE — 74177 CT ABD & PELVIS W/CONTRAST: CPT

## 2021-06-16 PROCEDURE — 85378 FIBRIN DEGRADE SEMIQUANT: CPT

## 2021-06-16 PROCEDURE — 71275 CT ANGIOGRAPHY CHEST: CPT

## 2021-06-16 PROCEDURE — 83605 ASSAY OF LACTIC ACID: CPT

## 2021-06-16 PROCEDURE — 83690 ASSAY OF LIPASE: CPT

## 2021-06-16 PROCEDURE — 81025 URINE PREGNANCY TEST: CPT

## 2021-06-16 PROCEDURE — 2580000003 HC RX 258: Performed by: NURSE PRACTITIONER

## 2021-06-16 PROCEDURE — 85025 COMPLETE CBC W/AUTO DIFF WBC: CPT

## 2021-06-16 PROCEDURE — 99283 EMERGENCY DEPT VISIT LOW MDM: CPT

## 2021-06-16 PROCEDURE — 36415 COLL VENOUS BLD VENIPUNCTURE: CPT

## 2021-06-16 RX ORDER — 0.9 % SODIUM CHLORIDE 0.9 %
1000 INTRAVENOUS SOLUTION INTRAVENOUS ONCE
Status: COMPLETED | OUTPATIENT
Start: 2021-06-16 | End: 2021-06-16

## 2021-06-16 RX ORDER — KETOROLAC TROMETHAMINE 30 MG/ML
15 INJECTION, SOLUTION INTRAMUSCULAR; INTRAVENOUS ONCE
Status: COMPLETED | OUTPATIENT
Start: 2021-06-16 | End: 2021-06-16

## 2021-06-16 RX ORDER — HYDROCODONE BITARTRATE AND ACETAMINOPHEN 5; 325 MG/1; MG/1
1 TABLET ORAL EVERY 6 HOURS PRN
Qty: 12 TABLET | Refills: 0 | Status: SHIPPED | OUTPATIENT
Start: 2021-06-16 | End: 2021-06-19

## 2021-06-16 RX ADMIN — SODIUM CHLORIDE 1000 ML: 9 INJECTION, SOLUTION INTRAVENOUS at 14:58

## 2021-06-16 RX ADMIN — IOPAMIDOL 75 ML: 755 INJECTION, SOLUTION INTRAVENOUS at 15:39

## 2021-06-16 RX ADMIN — KETOROLAC TROMETHAMINE 15 MG: 30 INJECTION, SOLUTION INTRAMUSCULAR; INTRAVENOUS at 13:48

## 2021-06-16 ASSESSMENT — PAIN DESCRIPTION - PROGRESSION
CLINICAL_PROGRESSION: RESOLVED
CLINICAL_PROGRESSION: GRADUALLY WORSENING
CLINICAL_PROGRESSION: GRADUALLY IMPROVING

## 2021-06-16 ASSESSMENT — PAIN DESCRIPTION - LOCATION
LOCATION: FLANK
LOCATION: FLANK

## 2021-06-16 ASSESSMENT — PAIN DESCRIPTION - FREQUENCY
FREQUENCY: CONTINUOUS
FREQUENCY: CONTINUOUS

## 2021-06-16 ASSESSMENT — PAIN SCALES - GENERAL
PAINLEVEL_OUTOF10: 6
PAINLEVEL_OUTOF10: 2
PAINLEVEL_OUTOF10: 6

## 2021-06-16 ASSESSMENT — PAIN DESCRIPTION - DESCRIPTORS
DESCRIPTORS: ACHING
DESCRIPTORS: SHARP

## 2021-06-16 ASSESSMENT — PAIN DESCRIPTION - PAIN TYPE
TYPE: ACUTE PAIN
TYPE: ACUTE PAIN

## 2021-06-16 ASSESSMENT — PAIN DESCRIPTION - ONSET
ONSET: ON-GOING
ONSET: ON-GOING

## 2021-06-16 ASSESSMENT — PAIN DESCRIPTION - ORIENTATION: ORIENTATION: RIGHT

## 2021-06-16 NOTE — ED PROVIDER NOTES
[facility]     HPI:  6/16/21, Time: 1:42 PM EDT         Cheryle Calvin is a 62 y.o. female presenting to the ED for right lateral chest wall pain, beginning 8 days ago. The complaint has been persistent, moderate in severity, and worsened by nothing. Complaining of pain to the right posterior lateral chest wall area which she states began approximately 8 days ago. States the pain initially began on the eighth. She was seen by Dr. Dread Zavala her PCP on the ninth she did have outpatient labs done was also ordered an ultrasound of the abdomen and pelvis to be done on the 24th. She states the pain has become more severe and more persistent which prompted her visit today. States she has increased pain with deep inspiration, movement and when she coughs or sneezes. She denies any known fall or known injury. She denies any alcohol or substance abuse however does admit to tobacco abuse. ROS:   Pertinent positives and negatives are stated within HPI, all other systems reviewed and are negative.  --------------------------------------------- PAST HISTORY ---------------------------------------------  Past Medical History:  has a past medical history of Alcoholic ([facility] Anxiety, Chronic right shoulder pain, Convulsions/seizures ([facility] Depression, Depression, GERD (gastroesophageal reflux disease), Headache, HTN (hypertension), Mixed hyperlipidemia, Osteoarthritis, Psychiatric problem, and Substance abuse ([facility] Past Surgical History:  has a past surgical history that includes Ectopic pregnancy surgery; Breast surgery (1988); and Tubal ligation (01/01/1993). Social History:  reports that she has been smoking cigarettes. She started smoking about 30 years ago. She has a 7.50 pack-year smoking history. She has never used smokeless tobacco. She reports that she does not drink alcohol and does not use drugs.     Family History: family history includes Arthritis in her father and mother; Diabetes in her father and mother; High Blood Pressure in her father and mother; High Cholesterol in her father and mother; Kidney Disease in her mother; Obesity in her father and mother; Other in her father; Stroke in her mother; Substance Abuse in her son. The patients home medications have been reviewed. Allergies: Patient has no known allergies. ---------------------------------------------------PHYSICAL EXAM--------------------------------------    Constitutional/General: Alert and oriented x3, well appearing, non toxic in NAD  Head: Normocephalic and atraumatic  Eyes: PERRL, EOMI  Mouth: Oropharynx clear, handling secretions, no trismus  Neck: Supple, full ROM, non tender to palpation in the midline, no stridor, no crepitus, no meningeal signs  Pulmonary: Lungs clear to auscultation bilaterally, no wheezes, rales, or rhonchi. Not in respiratory distress. She has tenderness on palpation to the posterior lateral aspect of the right lateral chest wall area. No crepitus on exam.  No erythema, rash or abnormality noted. Cardiovascular:  Regular rate. Regular rhythm. No murmurs, gallops, or rubs. 2+ distal pulses  Chest: no chest wall tenderness  Abdomen: Soft. Non tender. Non distended. +BS. No rebound, guarding, or rigidity. No pulsatile masses appreciated. Musculoskeletal: Moves all extremities x 4. Warm and well perfused, no clubbing, cyanosis, or edema. Capillary refill <3 seconds  Skin: warm and dry. No rashes. Neurologic: GCS 15, CN 2-12 grossly intact, no focal deficits, symmetric strength 5/5 in the upper and lower extremities bilaterally  Psych: Normal Affect    -------------------------------------------------- RESULTS -------------------------------------------------  I have personally reviewed all laboratory and imaging results for this patient. Results are listed below.      LABS:  Results for orders placed or performed during the hospital encounter of 06/16/21   Pregnancy, Urine   Result Value Ref Range HCG(Urine) Pregnancy Test NEGATIVE NEGATIVE   Urinalysis   Result Value Ref Range    Color, UA Yellow Straw/Yellow    Clarity, UA Clear Clear    Glucose, Ur Negative Negative mg/dL    Bilirubin Urine Negative Negative    Ketones, Urine Negative Negative mg/dL    Specific Gravity, UA <=1.005 1.005 - 1.030    Blood, Urine Negative Negative    pH, UA 6.0 5.0 - 9.0    Protein, UA Negative Negative mg/dL    Urobilinogen, Urine 0.2 <2.0 E.U./dL    Nitrite, Urine Negative Negative    Leukocyte Esterase, Urine Negative Negative   Troponin   Result Value Ref Range    Troponin, High Sensitivity <6 0 - 9 ng/L   CBC Auto Differential   Result Value Ref Range    WBC 10.8 4.5 - 11.5 E9/L    RBC 4.02 3.50 - 5.50 E12/L    Hemoglobin 13.1 11.5 - 15.5 g/dL    Hematocrit 37.5 34.0 - 48.0 %    MCV 93.3 80.0 - 99.9 fL    MCH 32.6 26.0 - 35.0 pg    MCHC 34.9 (H) 32.0 - 34.5 %    RDW 12.3 11.5 - 15.0 fL    Platelets 737 098 - 699 E9/L    MPV 10.0 7.0 - 12.0 fL    Neutrophils % 62.4 43.0 - 80.0 %    Immature Granulocytes % 0.6 0.0 - 5.0 %    Lymphocytes % 27.8 20.0 - 42.0 %    Monocytes % 8.1 2.0 - 12.0 %    Eosinophils % 0.6 0.0 - 6.0 %    Basophils % 0.5 0.0 - 2.0 %    Neutrophils Absolute 6.74 1.80 - 7.30 E9/L    Immature Granulocytes # 0.06 E9/L    Lymphocytes Absolute 3.00 1.50 - 4.00 E9/L    Monocytes Absolute 0.88 0.10 - 0.95 E9/L    Eosinophils Absolute 0.07 0.05 - 0.50 E9/L    Basophils Absolute 0.05 0.00 - 0.20 E9/L   Comprehensive Metabolic Panel   Result Value Ref Range    Sodium 139 132 - 146 mmol/L    Potassium 3.4 (L) 3.5 - 5.0 mmol/L    Chloride 97 (L) 98 - 107 mmol/L    CO2 25 22 - 29 mmol/L    Anion Gap 17 (H) 7 - 16 mmol/L    Glucose 97 74 - 99 mg/dL    BUN 8 6 - 20 mg/dL    CREATININE 0.6 0.5 - 1.0 mg/dL    GFR Non-African American >60 >=60 mL/min/1.73    GFR African American >60     Calcium 10.4 (H) 8.6 - 10.2 mg/dL    Total Protein 7.7 6.4 - 8.3 g/dL    Albumin 4.4 3.5 - 5.2 g/dL    Total Bilirubin 0.2 0.0 - 1.2 mg/dL Alkaline Phosphatase 201 (H) 35 - 104 U/L    ALT 16 0 - 32 U/L    AST 33 (H) 0 - 31 U/L   Lipase   Result Value Ref Range    Lipase 42 13 - 60 U/L   Lactic Acid, Plasma   Result Value Ref Range    Lactic Acid 2.4 (H) 0.5 - 2.2 mmol/L   SPECIMEN REJECTION   Result Value Ref Range    Rejected Test dimer     Reason for Rejection see below    D-Dimer, Quantitative   Result Value Ref Range    D-Dimer, Quant 621 ng/mL DDU   EKG 12 Lead   Result Value Ref Range    Ventricular Rate 65 BPM    Atrial Rate 65 BPM    P-R Interval 134 ms    QRS Duration 80 ms    Q-T Interval 416 ms    QTc Calculation (Bazett) 432 ms    P Axis 71 degrees    R Axis 15 degrees    T Axis 43 degrees       RADIOLOGY:  Interpreted by Radiologist.  CT ABDOMEN PELVIS W IV CONTRAST Additional Contrast? None   Final Result   1. Circumferentially infiltrative mass in the upper rectum, consistent with   malignancy. 2. Metastatic lymph nodes in the adjacent mesorectum, as well as the precaval   region of the upper abdomen. 3. Multiple prominent liver metastases. The         CTA CHEST W CONTRAST   Final Result   1. There is no evidence of a pulmonary embolus. 2. There is no chest wall mass. 3. There are multiple low attenuated mildly enhancing lesions seen throughout   the right and left hepatic lobes most consistent with metastatic disease. Please see the dedicated CT scan of the abdomen and pelvis report regarding   further findings within the abdomen and pelvis. 4. 7 mm nodule seen within the right lower lobe posteriorly worrisome for   metastatic disease.                EKG Interpretation  Interpreted by emergency department physician    Rhythm: normal sinus   Rate: normal  Axis: normal  Conduction: normal  ST Segments: no acute change  T Waves: no acute change    Clinical Impression: no acute changes  Comparison to prior EKG: stable as compared to patient's most recent EKG      ------------------------- NURSING NOTES AND VITALS REVIEWED ---------------------------   The nursing notes within the ED encounter and vital signs as below have been reviewed by myself. /70   Pulse 70   Temp 97.9 °F (36.6 °C) (Oral)   Resp 16   Ht 5' 4\" (1.626 m)   Wt 157 lb (71.2 kg)   LMP 09/08/2011   SpO2 97%   BMI 26.95 kg/m²   Oxygen Saturation Interpretation: Normal    The patients available past medical records and past encounters were reviewed. ------------------------------ ED COURSE/MEDICAL DECISION MAKING----------------------  Medications   ketorolac (TORADOL) injection 15 mg (15 mg Intravenous Given 6/16/21 1348)   0.9 % sodium chloride bolus (0 mLs Intravenous Stopped 6/16/21 1637)   iopamidol (ISOVUE-370) 76 % injection 75 mL (75 mLs Intravenous Given 6/16/21 1539)             Medical Decision Making:    Time: 6921  Re-evaluation. Patients symptoms are improving  Repeat physical examination is improved, patient symptoms are improved after pain medication was given. Her labs and CAT scans were all reviewed at length. The CAT scan results were discussed with the patient and her  including the concern for malignancy with metastasis with mass at the lung, liver and rectal area. Emotional support was provided for both she and her . PCP will be contacted. 1650-call to PCP Dr. Arcos, update on patient's clinical presentation physical exam and abnormal findings on the CAT scan concerning for malignancy with metastasis. The patient does not want to be admitted to the hospital and states that she would prefer to follow-up. PCP is okay with the patient being discharged from this department and he will call her tomorrow morning to arrange for follow-up and further evaluation of the abnormal CAT scan results. This was discussed again with the patient and her  who both agree with the plan. She was provided with pain medication and once again emotional support was provided.     Re-Evaluations: Re-evaluation. Patients symptoms show no change      Consultations:                 Critical Care: This patient's ED course included: a personal history and physicial examination, re-evaluation prior to disposition, multiple bedside re-evaluations and a personal history and physicial eaxmination    This patient has remained hemodynamically stable and improved during their ED course. Counseling: The emergency provider has spoken with the patient and spouse/SO and discussed todays results, in addition to providing specific details for the plan of care and counseling regarding the diagnosis and prognosis. Questions are answered at this time and they are agreeable with the plan.       --------------------------------- IMPRESSION AND DISPOSITION ---------------------------------    IMPRESSION  1. Chest wall pain    2. Lung mass    3. Liver mass    4. Rectal mass        DISPOSITION  Disposition: Discharge to home  Patient condition is good        NOTE: This report was transcribed using voice recognition software.  Every effort was made to ensure accuracy; however, inadvertent computerized transcription errors may be present         NAYLA Street - CNP  06/16/21 [id number]

## 2021-06-16 NOTE — TELEPHONE ENCOUNTER
Radha Willett called in and is having a lot of discomfort in her right side, she is taking short breaths. She thinks maybe she broke a rib. She is going to go down to [facility] to have it checked.     Thank you

## 2021-06-17 NOTE — TELEPHONE ENCOUNTER
Yong Cody called in and is stating that she now knows the reason for her diarrhea. She is going to cancel her appointment with Dr. Ben Reese since he will not be able to help her.

## 2021-06-21 ENCOUNTER — TELEPHONE (OUTPATIENT)
Dept: FAMILY MEDICINE CLINIC | Age: 61
End: 2021-06-21

## 2021-06-21 DIAGNOSIS — R91.8 LUNG MASS: ICD-10-CM

## 2021-06-21 DIAGNOSIS — R16.0 LIVER MASS: ICD-10-CM

## 2021-06-21 DIAGNOSIS — K62.89 RECTAL MASS: ICD-10-CM

## 2021-06-21 RX ORDER — HYDROCODONE BITARTRATE AND ACETAMINOPHEN 5; 325 MG/1; MG/1
1 TABLET ORAL EVERY 6 HOURS PRN
Qty: 120 TABLET | Refills: 0 | Status: SHIPPED
Start: 2021-06-21 | End: 2021-06-23 | Stop reason: SDUPTHER

## 2021-06-21 NOTE — TELEPHONE ENCOUNTER
The patient has an appointment with you on Wednesday. She is requesting pain medication to help her with the pain due to her recent results.   Patient states she has not seen oncology yet so she does not have any treatment plan set up yet  Please advise

## 2021-06-22 ENCOUNTER — TELEPHONE (OUTPATIENT)
Dept: SURGERY | Age: 61
End: 2021-06-22

## 2021-06-22 NOTE — TELEPHONE ENCOUNTER
MA made attempt to contact patient to schedule appointment based on referral by Dr Karissa Rosales for diarrhea consult. Patient had cancelled appointment with Dr Leatha Kilgore last week. Patient did not answer so MA left message requesting return call to schedule.      Electronically signed by Eliazar Poole on 6/22/21 at 10:51 AM EDT

## 2021-06-23 ENCOUNTER — OFFICE VISIT (OUTPATIENT)
Dept: FAMILY MEDICINE CLINIC | Age: 61
End: 2021-06-23
Payer: COMMERCIAL

## 2021-06-23 VITALS
BODY MASS INDEX: 27.14 KG/M2 | HEIGHT: 64 IN | TEMPERATURE: 97.6 F | SYSTOLIC BLOOD PRESSURE: 128 MMHG | DIASTOLIC BLOOD PRESSURE: 75 MMHG | HEART RATE: 72 BPM | WEIGHT: 159 LBS | OXYGEN SATURATION: 97 %

## 2021-06-23 DIAGNOSIS — K62.89 RECTAL MASS: ICD-10-CM

## 2021-06-23 PROCEDURE — 4004F PT TOBACCO SCREEN RCVD TLK: CPT | Performed by: FAMILY MEDICINE

## 2021-06-23 PROCEDURE — G8427 DOCREV CUR MEDS BY ELIG CLIN: HCPCS | Performed by: FAMILY MEDICINE

## 2021-06-23 PROCEDURE — G8419 CALC BMI OUT NRM PARAM NOF/U: HCPCS | Performed by: FAMILY MEDICINE

## 2021-06-23 PROCEDURE — 99213 OFFICE O/P EST LOW 20 MIN: CPT | Performed by: FAMILY MEDICINE

## 2021-06-23 PROCEDURE — 3017F COLORECTAL CA SCREEN DOC REV: CPT | Performed by: FAMILY MEDICINE

## 2021-06-23 PROCEDURE — 99212 OFFICE O/P EST SF 10 MIN: CPT | Performed by: FAMILY MEDICINE

## 2021-06-23 RX ORDER — HYDROCODONE BITARTRATE AND ACETAMINOPHEN 5; 325 MG/1; MG/1
1 TABLET ORAL EVERY 6 HOURS PRN
Qty: 120 TABLET | Refills: 0 | Status: SHIPPED
Start: 2021-06-23 | End: 2021-07-01 | Stop reason: SDUPTHER

## 2021-06-23 SDOH — ECONOMIC STABILITY: FOOD INSECURITY: WITHIN THE PAST 12 MONTHS, THE FOOD YOU BOUGHT JUST DIDN'T LAST AND YOU DIDN'T HAVE MONEY TO GET MORE.: NEVER TRUE

## 2021-06-23 SDOH — ECONOMIC STABILITY: FOOD INSECURITY: WITHIN THE PAST 12 MONTHS, YOU WORRIED THAT YOUR FOOD WOULD RUN OUT BEFORE YOU GOT MONEY TO BUY MORE.: NEVER TRUE

## 2021-06-23 ASSESSMENT — SOCIAL DETERMINANTS OF HEALTH (SDOH): HOW HARD IS IT FOR YOU TO PAY FOR THE VERY BASICS LIKE FOOD, HOUSING, MEDICAL CARE, AND HEATING?: NOT HARD AT ALL

## 2021-06-23 NOTE — PROGRESS NOTES
Chief Complaint   Patient presents with   [address] from [place] [address] for follow-up of rectal mass and apparent metastases. Refer to CT results  Her main symptom has been right flank pain, especially with coughing. Also had some rectal bleeding, which has subsided with the avoidance of roughage foods. We have been generous with analgesia, while anticipating a likely surgical and oncology evaluation and treatment. She came here to discuss the evaluation. She wants to be seen and treated in the [facility] system or satellite. /75 (Site: Left Upper Arm, Position: Sitting, Cuff Size: Medium Adult)   Pulse 72   Temp 97.6 °F (36.4 °C) (Temporal)   Ht 5' 3.5\" (1.613 m)   Wt 159 lb (72.1 kg)   LMP 09/08/2011   SpO2 97%   BMI 27.72 kg/m²     General appearance good   Perrl     Fundi benign   TM's intact   Canals clear    Nose and throat free of inflammation and exudate     Neck supple    No carotid bruits    No palpable thyroid abnormalities    Heart: regular rhythm, no murmur or gallop    Lungs clear anterior and posterior    Abdomen supple     No masses, tenderness or organomegaly    Normal bowel sounds    Normal musculature     Full range of motion    Neurological motor and sensory intact      Lab Results   Component Value Date    WBC 10.8 06/16/2021    HGB 13.1 06/16/2021    HCT 37.5 06/16/2021     06/16/2021    CHOL 216 (H) 12/14/2020    TRIG 187 (H) 12/14/2020    HDL 62 12/14/2020    ALT 16 06/16/2021    AST 33 (H) 06/16/2021     06/16/2021    K 3.4 (L) 06/16/2021    CL 97 (L) 06/16/2021    CREATININE 0.6 06/16/2021    BUN 8 06/16/2021    CO2 25 06/16/2021    TSH 0.919 06/09/2021    INR 1.0 09/15/2014     Impression:  Rectal mass with multiple metastases.       Patient Active Problem List   Diagnosis    Convulsions/seizures ([address]    Alcohol withdrawal ([address]    Alcoholism ([address]    Seizures ([address]    Seizure ([address]    Hypokalemia    Common bile duct dilatation    HTN (hypertension)    Smoking    Mixed hyperlipidemia    Chronic GERD    Antral gastritis    Diarrhea due to drug    Diarrhea    Right shoulder pain    Depression       Current Outpatient Medications   Medication Sig Dispense Refill    HYDROcodone-acetaminophen (NORCO) 5-325 MG per tablet Take 1 tablet by mouth every 6 hours as needed for Pain for up to 30 days. 120 tablet 0    ferrous sulfate (IRON 325) 325 (65 Fe) MG tablet Take 325 mg by mouth daily (with breakfast)      sertraline (ZOLOFT) 100 MG tablet TAKE 1 TABLET BY MOUTH ONCE EVERY DAY 30 tablet 3    omeprazole (PRILOSEC) 20 MG delayed release capsule Take 1 capsule by mouth daily 30 capsule 3    atorvastatin (LIPITOR) 10 MG tablet Take 1 tablet by mouth daily 30 tablet 12    hydroCHLOROthiazide (MICROZIDE) 12.5 MG capsule TAKE ONE CAPSULE BY MOUTH ONCE A  DAY 30 capsule 11     No current facility-administered medications for this visit. Bart Dale was seen today for follow-up from hospital.    Diagnoses and all orders for this visit:    Rectal mass  -     HYDROcodone-acetaminophen (NORCO) 5-325 MG per tablet; Take 1 tablet by mouth every 6 hours as needed for Pain for up to 30 days. At her request, she will request consultation with [facility] system, possibly a satellite. She was asked to call here if any help with this consultation is necessary, and also to request a transfer of the records which we have available.

## 2021-06-23 NOTE — PATIENT INSTRUCTIONS
DASH Diet    What is the DASH diet? DASH stands for Dietary Approaches to Stop Hypertension. It is a balanced eating plan that your family doctor might recommend to help you lower your blood pressure. The DASH diet:  Is low in salt, saturated fat, cholesterol and total fat. Emphasizes fruits, vegetables, and fat-free or low-fat milk and milk products. Includes whole grains, fish, poultry and nuts. Limits the amount of red meat, sweets, added sugars and sugar-containing beverages in your diet. Is rich in potassium, magnesium and calcium, as well as protein and fiber. How can the DASH diet help me stay healthy? Getting too much sodium (salt) in your diet can contribute to high blood pressure (also called hypertension). Some salt is in foods naturally, and some salt is added to food when it is processed or prepared. Following the DASH diet can help you lower your blood pressure, or prevent high blood pressure, by reducing the amount of sodium in your diet to less than 2,300 mg per day. The fruits, vegetables and whole grains recommended in the DASH diet provide many other elements of a healthy diet, such as lycopene, beta-carotene and isoflavones. These can help protect your body against common health conditions, such as cancer, osteoporosis, stroke and diabetes. Following the DASH diet can also help reduce your risk of heart disease by lowering your low-density lipoprotein (LDL, or \"bad\") cholesterol level. Following the DASH diet may drop your blood pressure by a few points in as little as 2 weeks. However, you should not stop taking your blood pressure medicine, or any other prescribed medicine, without talking to your doctor first.  What kinds of foods are included in the [address]? The DASH diet is nutritionally balanced for good health. You dont need to buy any special foods or pills, or cook with any special recipes, to follow the DASH diet.  If you follow the DASH diet, you will eat about 2,000 your favorite foods or adding more whole grains to your meals. Learn what makes a serving for each type of food. For example, 1 serving equals 1 slice of bread, 8 ounces of milk, 1 cup of raw vegetables or 1/2 cup of cooked vegetables. For more serving sizes, go to the [address] [place] [address] site. Dont have a measuring cup? One serving (3 ounces) of meat or poultry is about the size of a deck of cards. One serving (1/2 cup) of rice or potato looks like half a baseball, and a serving of cheese is about the size of 4 stacked dice. If eating more fruits and vegetables gives you gas, bloating or diarrhea, increase these foods slowly. You can also talk to your family doctor about taking over-the-counter medicines to reduce these symptoms until your body adjusts. Get more exercise. Physical activity helps lower your blood pressure and can help you lose more weight. Use salt-free seasonings, such as spices and herbs, to add flavor to your recipes and reduce or eliminate salt. Include as many fresh and unprocessed foods as possible. Cut back on frozen dinners, packaged mixes, canned soups and bottled salad dressings, which are often high in sodium. When buying canned, frozen or processed foods, check nutrition labels for the amount of sodium, sugar and saturated fat. Look for the phrases \"no salt added,\" \"sodium-free,\" \"low sodium\" or \"very low sodium\" on food packages. Choose foods with monounsaturated and polyunsaturated fats. Steam, grill, poach, roast or stir-beltran foods. Use low-sodium broth or water instead of butter or oil for sautéing. When you eat at a restaurant, ask how foods are prepared. Ask if your order can be made without added salt. Dont add salty condiments, such as ketchup, mustard, pickles or sauces, to your food.

## 2021-06-24 ENCOUNTER — TELEPHONE (OUTPATIENT)
Dept: FAMILY MEDICINE CLINIC | Age: 61
End: 2021-06-24

## 2021-06-24 NOTE — TELEPHONE ENCOUNTER
[facility] called in and they are stating that Emily's  US retroperitoneal is included in the US abdomin complete, so they will only be preforming the one test.    Thank you
(0) independent

## 2021-07-01 ENCOUNTER — TELEPHONE (OUTPATIENT)
Dept: FAMILY MEDICINE CLINIC | Age: 61
End: 2021-07-01

## 2021-07-01 DIAGNOSIS — K62.89 RECTAL MASS: ICD-10-CM

## 2021-07-01 RX ORDER — HYDROCODONE BITARTRATE AND ACETAMINOPHEN 5; 325 MG/1; MG/1
1 TABLET ORAL EVERY 6 HOURS PRN
Qty: 120 TABLET | Refills: 0 | Status: SHIPPED
Start: 2021-07-01 | End: 2021-08-10

## 2021-07-01 NOTE — TELEPHONE ENCOUNTER
The patient called requesting another 7 days of pain pills for her cancer.   The patient states she has to get it 7 days at a time     She reports it is stage 4 rectal cancer

## 2021-07-01 NOTE — TELEPHONE ENCOUNTER
Please tell her it is ordered. And please make sure we get a prior auth done. Thanx. And many thanx for that Ursuline article.

## 2021-07-01 NOTE — PROGRESS NOTES
Controlled Substance Monitoring:    Acute and Chronic Pain Monitoring:   RX Monitoring 7/1/2021   Periodic Controlled Substance Monitoring Possible medication side effects, risk of tolerance/dependence & alternative treatments discussed. ;No signs of potential drug abuse or diversion identified. ;Assessed functional status. ;Obtaining appropriate analgesic effect of treatment.    Chronic Pain > 50 MEDD -

## 2021-07-09 ENCOUNTER — TELEPHONE (OUTPATIENT)
Dept: CASE MANAGEMENT | Age: 61
End: 2021-07-09

## 2021-07-09 NOTE — TELEPHONE ENCOUNTER
Patient new consult education packet assembled for upcoming appointment at [facility] with Dr. Jia Smith. Nurse Navigator is scheduled to meet with patient at consultation appointment on 7/16/2021. RITIKA Sinha, RN, OCN  Oncology Nurse Navigator

## 2021-07-20 ENCOUNTER — TELEPHONE (OUTPATIENT)
Dept: CASE MANAGEMENT | Age: 61
End: 2021-07-20

## 2021-07-20 NOTE — TELEPHONE ENCOUNTER
Received VM from Josy Chaney, RN coordinator with Dr. Shar Elizabeth at [facility], stating that patient had surgery 7/15/2021 and pathology review is not complete. NGS testing to be ordered once pathology review is done. Sample will likely require 3-4 weeks for results, which will be released to My Chart. She left desk number left in case of additional questions. Staff updated and will reschedule patient once discharged and pathology completed. Flori Jackson, KARLAW, RN, OCN  Oncology Nurse Navigator

## 2021-07-21 ENCOUNTER — TELEPHONE (OUTPATIENT)
Dept: FAMILY MEDICINE CLINIC | Age: 61
End: 2021-07-21

## 2021-07-21 DIAGNOSIS — C18.9 ADENOCARCINOMA OF COLON (HCC): Primary | ICD-10-CM

## 2021-07-21 RX ORDER — OXYCODONE HYDROCHLORIDE 10 MG/1
10 TABLET ORAL EVERY 6 HOURS PRN
Qty: 120 TABLET | Refills: 0 | Status: SHIPPED
Start: 2021-07-21 | End: 2021-08-09 | Stop reason: SDUPTHER

## 2021-07-21 NOTE — PROGRESS NOTES
Controlled Substance Monitoring:    Acute and Chronic Pain Monitoring:   RX Monitoring 7/21/2021   Periodic Controlled Substance Monitoring Possible medication side effects, risk of tolerance/dependence & alternative treatments discussed. ;No signs of potential drug abuse or diversion identified. ;Assessed functional status. ;Obtaining appropriate analgesic effect of treatment.    Chronic Pain > 50 MEDD -

## 2021-07-21 NOTE — TELEPHONE ENCOUNTER
----- Message from Jo-Ann Goncalves sent at 7/21/2021  1:09 PM EDT -----  Subject: Message to Provider    QUESTIONS  Information for Provider? Patient calling to request new prescription,   [facility] no longer wants her to take hydrocodone and wants her to   have oxycodone - just had colostomy put in at hospital and is taking   Tylenol. Please send prescription to the [facility] [address] in   [place].   ---------------------------------------------------------------------------  --------------  CALL BACK INFO  What is the best way for the office to contact you? OK to leave message on   voicemail  Preferred Call Back Phone Number? [phone]  ---------------------------------------------------------------------------  --------------  SCRIPT ANSWERS  Relationship to Patient?  Self

## 2021-07-22 ENCOUNTER — TELEPHONE (OUTPATIENT)
Dept: INFUSION THERAPY | Age: 61
End: 2021-07-22

## 2021-08-06 ENCOUNTER — TELEPHONE (OUTPATIENT)
Dept: CASE MANAGEMENT | Age: 61
End: 2021-08-06

## 2021-08-06 ENCOUNTER — TELEPHONE (OUTPATIENT)
Dept: SURGERY | Age: 61
End: 2021-08-06

## 2021-08-06 ENCOUNTER — OFFICE VISIT (OUTPATIENT)
Dept: ONCOLOGY | Age: 61
End: 2021-08-06
Payer: COMMERCIAL

## 2021-08-06 ENCOUNTER — TELEPHONE (OUTPATIENT)
Dept: INFUSION THERAPY | Age: 61
End: 2021-08-06

## 2021-08-06 ENCOUNTER — HOSPITAL ENCOUNTER (OUTPATIENT)
Dept: INFUSION THERAPY | Age: 61
Discharge: HOME | End: 2021-08-06
Payer: COMMERCIAL

## 2021-08-06 VITALS
RESPIRATION RATE: 18 BRPM | HEIGHT: 64 IN | OXYGEN SATURATION: 97 % | DIASTOLIC BLOOD PRESSURE: 60 MMHG | WEIGHT: 144.9 LBS | TEMPERATURE: 98.9 F | BODY MASS INDEX: 24.74 KG/M2 | SYSTOLIC BLOOD PRESSURE: 120 MMHG | HEART RATE: 69 BPM

## 2021-08-06 DIAGNOSIS — C20 RECTAL CANCER (HCC): Primary | ICD-10-CM

## 2021-08-06 DIAGNOSIS — C20 RECTAL CANCER (HCC): ICD-10-CM

## 2021-08-06 LAB
ALBUMIN SERPL-MCNC: 4.1 G/DL (ref 3.5–5.2)
ALP BLD-CCNC: 212 U/L (ref 35–104)
ALT SERPL-CCNC: 9 U/L (ref 0–32)
ANION GAP SERPL CALCULATED.3IONS-SCNC: 14 MMOL/L (ref 7–16)
AST SERPL-CCNC: 20 U/L (ref 0–31)
BASOPHILS ABSOLUTE: 0.07 E9/L (ref 0–0.2)
BASOPHILS RELATIVE PERCENT: 0.7 % (ref 0–2)
BILIRUB SERPL-MCNC: 0.3 MG/DL (ref 0–1.2)
BUN BLDV-MCNC: 11 MG/DL (ref 6–20)
CALCIUM SERPL-MCNC: 9.2 MG/DL (ref 8.6–10.2)
CEA: 20.2 NG/ML (ref 0–5.2)
CHLORIDE BLD-SCNC: 102 MMOL/L (ref 98–107)
CO2: 26 MMOL/L (ref 22–29)
CREAT SERPL-MCNC: 0.6 MG/DL (ref 0.5–1)
EOSINOPHILS ABSOLUTE: 0.16 E9/L (ref 0.05–0.5)
EOSINOPHILS RELATIVE PERCENT: 1.6 % (ref 0–6)
GFR AFRICAN AMERICAN: >60
GFR NON-AFRICAN AMERICAN: >60 ML/MIN/1.73
GLUCOSE BLD-MCNC: 105 MG/DL (ref 74–99)
HCT VFR BLD CALC: 39.2 % (ref 34–48)
HEMOGLOBIN: 13 G/DL (ref 11.5–15.5)
IMMATURE GRANULOCYTES #: 0.04 E9/L
IMMATURE GRANULOCYTES %: 0.4 % (ref 0–5)
LYMPHOCYTES ABSOLUTE: 2.51 E9/L (ref 1.5–4)
LYMPHOCYTES RELATIVE PERCENT: 24.9 % (ref 20–42)
MCH RBC QN AUTO: 30.7 PG (ref 26–35)
MCHC RBC AUTO-ENTMCNC: 33.2 % (ref 32–34.5)
MCV RBC AUTO: 92.7 FL (ref 80–99.9)
MONOCYTES ABSOLUTE: 0.76 E9/L (ref 0.1–0.95)
MONOCYTES RELATIVE PERCENT: 7.5 % (ref 2–12)
NEUTROPHILS ABSOLUTE: 6.53 E9/L (ref 1.8–7.3)
NEUTROPHILS RELATIVE PERCENT: 64.9 % (ref 43–80)
PDW BLD-RTO: 12.1 FL (ref 11.5–15)
PLATELET # BLD: 469 E9/L (ref 130–450)
PMV BLD AUTO: 9.1 FL (ref 7–12)
POTASSIUM SERPL-SCNC: 3.5 MMOL/L (ref 3.5–5)
RBC # BLD: 4.23 E12/L (ref 3.5–5.5)
SODIUM BLD-SCNC: 142 MMOL/L (ref 132–146)
TOTAL PROTEIN: 7.4 G/DL (ref 6.4–8.3)
WBC # BLD: 10.1 E9/L (ref 4.5–11.5)

## 2021-08-06 PROCEDURE — 99214 OFFICE O/P EST MOD 30 MIN: CPT

## 2021-08-06 PROCEDURE — G8427 DOCREV CUR MEDS BY ELIG CLIN: HCPCS | Performed by: INTERNAL MEDICINE

## 2021-08-06 PROCEDURE — 99245 OFF/OP CONSLTJ NEW/EST HI 55: CPT | Performed by: INTERNAL MEDICINE

## 2021-08-06 PROCEDURE — 80053 COMPREHEN METABOLIC PANEL: CPT

## 2021-08-06 PROCEDURE — G8419 CALC BMI OUT NRM PARAM NOF/U: HCPCS | Performed by: INTERNAL MEDICINE

## 2021-08-06 PROCEDURE — 82378 CARCINOEMBRYONIC ANTIGEN: CPT

## 2021-08-06 PROCEDURE — 36415 COLL VENOUS BLD VENIPUNCTURE: CPT

## 2021-08-06 PROCEDURE — 85025 COMPLETE CBC W/AUTO DIFF WBC: CPT

## 2021-08-06 NOTE — TELEPHONE ENCOUNTER
Per Olaf Agrawal at [facility]. Pump will be supplied by [facility].   Gladys Carlson, 8/6/2021 4230

## 2021-08-06 NOTE — TELEPHONE ENCOUNTER
Prior Authorization Form:      DEMOGRAPHICS:                     Patient Name:  Milton Ro  Patient :  1960            Insurance:  Payor: [id number] [place] [address]  [address] / Plan: [facility] [address] / Product Type: *No Product type* /   Insurance ID Number:    Payor/Plan Subscr  Sex Relation Sub.  Ins. ID Effective Group Num   1. PADDY LUCIANO* LATRICE COSTA 1960 Female Self [id number] 1/2/15                                    [address]         DIAGNOSIS & PROCEDURE:                       Procedure/Operation: mediport insertion           CPT Code: 88140    Diagnosis:  Poor venous access    ICD10 Code: I87.8    Location:  [address] [place] [address]    Surgeon:  Randy Goetz INFORMATION:                          Date: 2021    Time:               Anesthesia:                                                         Status:  Outpatient        Special Comments:           Electronically signed by Eric Reynolds MA on 2021 at 12:00 PM

## 2021-08-06 NOTE — PROGRESS NOTES
Charisma Walter  1960 62 y.o. Referring Physician: Dr. Nereyda Castillo    PCP: Sulema Kwon MD    Vitals:    21 1008   BP: 120/60   Pulse: 69   Resp: 18   Temp: 98.9 °F (37.2 °C)   SpO2: 97%        Wt Readings from Last 3 Encounters:   21 144 lb 14.4 oz (65.7 kg)   21 159 lb (72.1 kg)   21 157 lb (71.2 kg)        Body mass index is 25.27 kg/m². Chief Complaint:   Chief Complaint   Patient presents with    New Patient         Cancer Staging  No matching staging information was found for the patient. Prior Radiation Therapy? NO    Concurrent Chemo/radiation? NO    Prior Chemotherapy? NO    Prior Hormonal Therapy? NO    Head and Neck Cancer? No, patient does NOT have HN cancer. LMP: 53    Age at first Menses: 16    : 3    Para: 2      Current Outpatient Medications:     oxyCODONE HCl (OXY-IR) 10 MG immediate release tablet, Take 1 tablet by mouth every 6 hours as needed for Pain for up to 30 days. Intended supply: 30 days, Disp: 120 tablet, Rfl: 0    sertraline (ZOLOFT) 100 MG tablet, TAKE 1 TABLET BY MOUTH ONCE EVERY DAY, Disp: 30 tablet, Rfl: 3    omeprazole (PRILOSEC) 20 MG delayed release capsule, Take 1 capsule by mouth daily, Disp: 30 capsule, Rfl: 3    atorvastatin (LIPITOR) 10 MG tablet, Take 1 tablet by mouth daily, Disp: 30 tablet, Rfl: 12    hydroCHLOROthiazide (MICROZIDE) 12.5 MG capsule, TAKE ONE CAPSULE BY MOUTH ONCE A  DAY, Disp: 30 capsule, Rfl: 11    HYDROcodone-acetaminophen (NORCO) 5-325 MG per tablet, Take 1 tablet by mouth every 6 hours as needed for Pain for up to 30 days. , Disp: 120 tablet, Rfl: 0       Past Medical History:   Diagnosis Date    [facility] [address]     Anxiety     Cancer ([facility] [address] [facility]     Chronic right shoulder pain     Convulsions/seizures ([facility] [address] 2011    Depression     Depression 2020    GERD (gastroesophageal reflux disease)     Headache     HTN (hypertension) 2015    Mixed hyperlipidemia 2016    Osteoarthritis     Psychiatric problem     Substance abuse ([facility] [address] [facility]     methadone       Past Surgical History:   Procedure Laterality Date    BREAST SURGERY  1988    augmentation silicone    COLON SURGERY  07/15/2021    ECTOPIC PREGNANCY SURGERY      TUBAL LIGATION  01/01/1993       Family History   Problem Relation Age of Onset    Diabetes Mother     Kidney Disease Mother     Stroke Mother     High Cholesterol Mother     Arthritis Mother     High Blood Pressure Mother     Obesity Mother     Diabetes Father     Arthritis Father     High Blood Pressure Father     High Cholesterol Father     Obesity Father     Other Father     Substance Abuse Son        Social History     Socioeconomic History    Marital status:      Spouse name: Not on file    Number of children: Not on file    Years of education: Not on file    Highest education level: Not on file   Occupational History    Not on file   Tobacco Use    Smoking status: Current Every Day Smoker     Packs/day: 0.50     Years: 25.00     Pack years: 12.50     Types: Cigarettes     Start date: 1991    Smokeless tobacco: Never Used   Vaping Use    Vaping Use: Never used   Substance and Sexual Activity    Alcohol use: No     Alcohol/week: 0.0 standard drinks     Comment: recovering alcoholic    Drug use: No     Comment: recovering addict    Sexual activity: Not Currently   Other Topics Concern    Not on file   Social History Narrative    Not on file     Social Determinants of Health     Financial Resource Strain: Low Risk     Difficulty of Paying Living Expenses: Not hard at all   Food Insecurity: No Food Insecurity    Worried About [facility] in the Last Year: Never true    [facility] in the Last Year: Never true   Transportation Needs:     Lack of Transportation (Medical):      Lack of Transportation (Non-Medical):    Physical Activity:     Days of Exercise per Week:     Minutes of Exercise per Session:    Stress:  Feeling of Stress :    Social Connections:     Frequency of Communication with Friends and Family:     Frequency of Social Gatherings with Friends and Family:     Attends [employer] Services:     Active Member of Clubs or Organizations:     Attends Club or Organization Meetings:     Marital Status:    Intimate Partner Violence:     Fear of Current or Ex-Partner:     Emotionally Abused:     Physically Abused:     Sexually Abused:      Occupation:   Retired:  YES: Patient is retired from [employer] for [employer]. Pacemaker/Defibulator/ICD:  No    Mediport: No    FALLS RISK SCREENING ASSESSMENT    Instructions:  Assess the patient and [place] the appropriate indicators that are present for fall risk identification. Total the numbers circled and assign a fall risk score from Table 2.  Reassess patient at a minimum every 12 weeks or with status change. Assessment   Date  8/6/2021     1. Mental Ability: confusion/cognitively impaired No - 0       2. Elimination Issues: incontinence, frequency No - 0       3. Ambulatory: use of assistive devices (walker, cane, off-loading devices), attached to equipment (IV pole, oxygen) No - 0     4. Sensory Limitations: dizziness, vertigo, impaired vision No - 0       5. Age Less than 65 years - 0       6. Medication: diuretics, strong analgesics, hypnotics, sedatives, antihypertensive agents   Yes - 3   7. Falls:  recent history of falls within the last 3 months (not to include slipping or tripping)   No - 0   TOTAL 3    If score of 4 or greater was education given? No       TABLE 2   Risk Score Risk Level Plan of Care   0-3 Little or  No Risk 1. Provide assistance as indicated for ambulation activities  2. Reorient confused/cognitively impaired patient  3. Call-light/bell within patient's reach  4. Chair/bed in low position, stretcher/bed with siderails up except when performing patient care activities  5.   Educate patient/family/caregiver on falls prevention  6.  Reassess in 12 weeks or with any noted change in patient condition which places them at a risk for a fall   4-6 Moderate Risk 1. Provide assistance as indicated for ambulation activities  2. Reorient confused/cognitively impaired patient  3. Call-light/bell within patient's reach  4. Chair/bed in low position, stretcher/bed with siderails up except when performing patient care activities  5. Educate patient/family/caregiver on falls prevention  6. Falls risk precaution (Yellow sticker Level II) placed on patient chart   7 or   Higher High Risk 1. Place patient in easily observable treatment room  2. Patient attended at all times by family member or staff  3. Provide assistance as indicated for ambulation activities  4. Reorient confused/cognitively impaired patient  5. Call-light/bell within patient's reach  6. Chair/bed in low position, stretcher/bed with siderails up except when performing patient care activities  7. Educate patient/family/caregiver on falls prevention  8. Falls risk precaution (Yellow sticker Level III) placed on patient chart           MALNUTRITION RISK SCREENING ASSESSMENT    Instructions:  Assess the patient and enter the appropriate indicators that are present for nutrition risk identification. Total the numbers entered and assign a risk score. Follow the appropriate action for total score listed below. Assessment   Date  8/6/2021     1. Have you lost weight without trying? 2- Yes, 5.1 kg to 10 kg     2. Have you been eating poorly because of a decreased appetite? 2- Yes   3. Do you have a diagnosis of head and neck cancer?       0- No                                                                                    TOTAL 4        Score of 0-1: No action  Score 2 or greater:  · For Non-Diabetic Patient: Recommend adding Ensure Enlive 2 x daily and provide patient with Ensure wellness bag with coupons  · For Diabetic Patient: Recommend adding Glucerna Shake 2 x daily and provide patient with Glucerna Wellness bag with coupons  · Route to the dietitian via [facility]    · Are you having  difficulty performing daily routine tasks  due to fatigue or weakness (ie: bathing/showering, dressing, housework, meal prep, work, child Gwenith Hover): No     · Do you have any arm flexibility/ROM restrictions, swelling or pain that limit activity: No     · Any changes in memory, attention/focus that impact daily activities: No     · Do you avoid participation in leisure/social activity due weakness, fatigue or pain: No     ARE ANY OF THE ABOVE ARE ANSWERED YES: No          PT ASSESSMENT FOR REFERRAL    · Have you had any recent falls in past 2 months: No     · Do you have difficulty  going up/down stairs: No     · Are you having difficulty walking: No     · Do you often hold onto furniture/environmental supports or feel off balance when you are walking: No     · Do you need to take rest breaks when you are walking: No     · Any pain on scale of 1-10 that limits your mobility: No 0/10    ARE ANY OF THE ABOVE ARE ANSWERED YES: No           PREHAB REFERRALS FOR NEOADJUVANT BREAST CANCER PATIENTS    Is this patient a breast cancer patient requiring neoadjuvant chemotherapy: No, this patient does NOT require neoadjuvant chemotherapy. LYMPHEDEMA SCREENING ASSESSMENT FOR PATIENTS WITH BREAST CANCER    The patient reports the following signs/symptoms of lymphedema: None    Please ask the provider to assess patient for lymphedema for any reported signs or symptoms so a referral to Lymphedema Therapy can be considered. PREHAB AUDIOLOGY REFERRAL    - Is patient planned to receive Cisplatin? No. This patient is not planned to start Cisplatin. - Is patient complaining of new onset hearing loss? No. Patient is not complaining of new onset hearing loss.         Mohamud Schmidt RN

## 2021-08-06 NOTE — TELEPHONE ENCOUNTER
Met with patient and her , Leroy Jennings, during her initial consultation appointment with Dr. Charlene Gentile at [facility] for her recent Stage IV (T4a, N2, M1) rectal cancer diagnosis per Dr. Urvashi Moreno. Introduced myself and explained my role with patients receiving treatment at our cancer center. Patient was friendly and receptive. Completed nursing assessment for the center including medication review and medical, social, and surgical histories. Family is supportive and assist with needs. Upon inquiring, states that she is doing better after 7/15/2021 surgery and that the ostomy is working well without any issues. Next appointment with ostomy nurse at [facility] is 8/13/2021 for follow up. Reports decreased appetite and 10 lb weight loss over the past 2 months. Denies any supplement drinks for replacement. Agreeable to dietician referral and samples today. Dr. Charlene Gentile reviewed recent surgical pathology and chemotherapy treatment recommendations after mediport insertion. Informed her that she will also have a chemotherapy teaching session with staff prior to chemotherapy initiation. Discussed additional ancillary staff available at the center and described their roles. Patient and family decline any further referral needs at this time. No transportation needs. Patient is retired. Provided with written literature of Patient Resource Booklet:  Colorectal Cancer, Chemocare: Eating Well During Chemotherapy, Chemocare: oxaliplatin and fluorouracil medication information, and [employer]. Verbalizes understanding. Provided patient with my contact information, office hours, and encouragement to call me with questions or concerns. Patient verbalizes understanding and appreciative of nurse navigator visit. Emotional support provided and greater than 45 minutes spent with patient. Nurse navigator will continue to follow.     Spoke with Avila Avila at Dr. Ev Crowell office regarding patient's mediport insertion prior to chemotherapy start date of 8/17/2021. Patient fully vaccinated. States that she will notify Jude Mendoza RN for scheduling. KARLA LozaW, RN, OCN  Oncology Nurse Navigator

## 2021-08-06 NOTE — PROGRESS NOTES
Department of [facility] Med Oncology   Attending Consult Note    Reason for Visit: Consultation on a patient with Rectal cancer    Referring Physician: Emelina Reaves MD    PCP:  Yanet Gagnon MD    History of Present Illness:  61 y/o female who was complaining of diarrhea blood in stool and painful defecation    CT abdomen/pelvis 06/16/2021:  Circumferentially infiltrative mass in the upper rectum, consistent with malignancy  Metastatic lymph nodes in the adjacent mesorectum, as well as the precaval region of the upper abdomen. Multiple prominent liver metastases    CTA chest 06/16/2021 noted no evidence of PE.  7 mm nodule seen within the right lower lobe posteriorly worrisome for metastatic disease. No region of intrathoracic lymphadenopathy is identified    Flex sigmoidoscopy 06/29/2021:  Nearly complete obstructive rectal mass at 15cms from AV  Colon, rectum, biopsy - Colonic mucosa with high-grade dysplasia; see comment. COMMENT   Additional deeper levels were also examined    MRI Rectum on 06/29/2021:  7.7 cm rectosigmoid mass spanning and invading the peritoneal reflection with superior rectal lymphadenopathy. Stage: T4a N2  MRF: Clear (tumor margin >2 mm from MRF)   Sphincter involvement: No.   Suspicious extra mesorectal lymph nodes: No    CEA 25.4 on 07/14/2021    Laparoscopic loop sigmoid colostomy and liver biopsy 07/15/2021  Liver, biopsy - Adenocarcinoma, morphologically compatible with colorectal primary    Review of Systems;  CONSTITUTIONAL: No fever, chills. Fair appetite and energy level. ENMT: Eyes: No diplopia; Nose: No epistaxis. Mouth: No sore throat. RESPIRATORY: No hemoptysis, shortness of breath, cough. CARDIOVASCULAR: No chest pain, palpitations. GASTROINTESTINAL: see HPI  GENITOURINARY: No dysuria, urinary frequency, hematuria. NEURO: No syncope, presyncope, headache.   Remainder:  ROS NEGATIVE    Past Medical History:      Diagnosis Date    Alcoholic ([address]     Anxiety     of Food in the Last Year: Never true   Transportation Needs:     Lack of Transportation (Medical):  Lack of Transportation (Non-Medical):    Physical Activity:     Days of Exercise per Week:     Minutes of Exercise per Session:    Stress:     Feeling of Stress :    Social Connections:     Frequency of Communication with Friends and Family:     Frequency of Social Gatherings with Friends and Family:     Attends Rastafarian Services:     Active Member of Clubs or Organizations:     Attends Club or Organization Meetings:     Marital Status:    Intimate Partner Violence:     Fear of Current or Ex-Partner:     Emotionally Abused:     Physically Abused:     Sexually Abused: Allergies:  No Known Allergies    Physical Exam:  /60   Pulse 69   Temp 98.9 °F (37.2 °C)   Resp 18   Ht 5' 3.5\" (1.613 m)   Wt 144 lb 14.4 oz (65.7 kg)   LMP 09/08/2011   SpO2 97%   BMI 25.27 kg/m²   GENERAL: Alert, oriented x 3, not in acute distress. HEENT: PERRLA; EOMI. Oropharynx clear. NECK: Supple. Without lymphadenopathy. LUNGS: Good air entry bilaterally. No wheezing, crackles or ronchi. CARDIOVASCULAR: Regular rate. No murmurs, rubs or gallops. ABDOMEN: Soft. Non-tender, non-distended. Positive bowel sounds  EXTREMITIES: Without clubbing, cyanosis, or edema. NEUROLOGIC: No focal deficits. ECOG PS 1    Impression/Plan:  63 y/o female with clinical stage: [id number] Rectosigmoid Cancer    CT abdomen/pelvis 06/16/2021:  Circumferentially infiltrative mass in the upper rectum, consistent with malignancy  Metastatic lymph nodes in the adjacent mesorectum, as well as the precaval region of the upper abdomen. Multiple prominent liver metastases    CTA chest 06/16/2021 noted no evidence of PE.  7 mm nodule seen within the right lower lobe posteriorly worrisome for metastatic disease.   No region of intrathoracic lymphadenopathy is identified    Flex sigmoidoscopy 06/29/2021:  Nearly complete obstructive rectal mass at 15cms from AV  Colon, rectum, biopsy - Colonic mucosa with high-grade dysplasia; see comment. COMMENT   Additional deeper levels were also examined    MRI Rectum on 06/29/2021:  7.7 cm rectosigmoid mass spanning and invading the peritoneal reflection with superior rectal lymphadenopathy. Stage: T4a N2  MRF: Clear (tumor margin >2 mm from MRF)   Sphincter involvement: No.   Suspicious extra mesorectal lymph nodes: No    CEA 25.4 on 07/14/2021    Laparoscopic loop sigmoid colostomy and liver biopsy 07/15/2021  Liver, biopsy - Adenocarcinoma, morphologically compatible with colorectal primary    Case discussed with Dr. João Julian [facility]  Systemic chemotherapy consists of FOLFOX + Avastin. Side effects reviewed. She agreed to proceed. Mediport placement. RTC after mediport placement for Cycle # 1 FOLFOX + Avastin    Thank you for allowing us to participate in the care of .  Tamika Larson MD   8/6/2021

## 2021-08-07 ENCOUNTER — PREP FOR PROCEDURE (OUTPATIENT)
Dept: SURGERY | Age: 61
End: 2021-08-07

## 2021-08-07 RX ORDER — SODIUM CHLORIDE 0.9 % (FLUSH) 0.9 %
5-40 SYRINGE (ML) INJECTION EVERY 12 HOURS SCHEDULED
Status: CANCELLED | OUTPATIENT
Start: 2021-08-07

## 2021-08-07 RX ORDER — SODIUM CHLORIDE 9 MG/ML
25 INJECTION, SOLUTION INTRAVENOUS PRN
Status: CANCELLED | OUTPATIENT
Start: 2021-08-07

## 2021-08-07 RX ORDER — SODIUM CHLORIDE 0.9 % (FLUSH) 0.9 %
5-40 SYRINGE (ML) INJECTION PRN
Status: CANCELLED | OUTPATIENT
Start: 2021-08-07

## 2021-08-07 RX ORDER — SODIUM CHLORIDE, SODIUM LACTATE, POTASSIUM CHLORIDE, CALCIUM CHLORIDE 600; 310; 30; 20 MG/100ML; MG/100ML; MG/100ML; MG/100ML
INJECTION, SOLUTION INTRAVENOUS CONTINUOUS
Status: CANCELLED | OUTPATIENT
Start: 2021-08-07

## 2021-08-09 ENCOUNTER — TELEPHONE (OUTPATIENT)
Dept: INFUSION THERAPY | Age: 61
End: 2021-08-09

## 2021-08-09 DIAGNOSIS — C18.9 ADENOCARCINOMA OF COLON (HCC): ICD-10-CM

## 2021-08-09 RX ORDER — OXYCODONE HYDROCHLORIDE 10 MG/1
10 TABLET ORAL EVERY 6 HOURS PRN
Qty: 120 TABLET | Refills: 0 | Status: SHIPPED
Start: 2021-08-09 | End: 2021-08-10

## 2021-08-09 NOTE — TELEPHONE ENCOUNTER
Controlled Substance Monitoring:    Acute and Chronic Pain Monitoring:   RX Monitoring 8/9/2021   Periodic Controlled Substance Monitoring Possible medication side effects, risk of tolerance/dependence & alternative treatments discussed. ;No signs of potential drug abuse or diversion identified.;Obtaining appropriate analgesic effect of treatment.    Chronic Pain > 50 MEDD -

## 2021-08-09 NOTE — TELEPHONE ENCOUNTER
Marcos Walter  8/9/2021  Wt Readings from Last 10 Encounters:   08/06/21 144 lb 14.4 oz (65.7 kg)   06/23/21 159 lb (72.1 kg)   06/16/21 157 lb (71.2 kg)   06/09/21 157 lb (71.2 kg)   04/13/21 153 lb (69.4 kg)   12/28/20 153 lb (69.4 kg)   12/14/20 157 lb (71.2 kg)   06/16/20 150 lb (68 kg)   01/23/20 153 lb (69.4 kg)   09/16/19 152 lb (68.9 kg)     Ht Readings from Last 1 Encounters:   08/06/21 5' 3.5\" (1.613 m)     There is no height or weight on file to calculate BMI. Contacted patient today per referral, re: weight loss and plan for initiation of FOLFOX+ Avastin. She reports that her appetite has been decreased, she aims for 1 meal per day, but often struggles to even do that. She has a 10-15# weight loss noted in the past 3mo. She lives with her  who helps with things around the house. She admits to pain due to her cancer, she is anxious to start her treatment to help with this. Spoke with patient about the importance of eating nutritional meals with protein so that her body does not become more weak or depleted. Suggested improved quality foods for meals, but also mentioned supplemental shake options. Patient did try some of the ensure that the nurse gave her and she says it is tolerable. She is very willing to take something, though, if it helps her through treatment. Will setup with shipment through [facility], patient updated she will be contacted by them for verbal agreement before shipment. Weight change: 15# loss in 3mo (9%)  Appetite: poor-fair  N/V/D/C: none  Calculated Needs if applicable: KPN=331#; [id number] (56x30), 70-80gm PRO (56x1.30), 1800ml (56x32)    Pre-Hab Eligible?: no        Recommendations: Ensure Enlive BID to provide 700kcal, 40gm PRO.           [facility] GUIDELINES FOR CLINICAL CHARACTERISTICS OF MALNUTRITION IN CHRONIC ILLNESS     Moderate Malnutrition  Severe Malnutrition    Energy intake  <75% energy intake compared to estimated needs for >1month <75% energy intake compared to estimated needs for >1month   Weight changes  5% x 1 month  7.5% x 3 months   10% x 6 months   20% x 1 year  >5% x 1 month  >7.5% x 3 months   >10% x 6 months   >20% x 1 year    Physical findings  Mild   Decrease subcutaneous fat    Decrease muscle mass     Increase fluid/edema   Severe  Decrease subcutaneous fat    Decrease muscle mass     Increase fluid/edema      Moderate malnutrition evidenced by 9% weight loss in 3mo , <75% intake x3mo.     Jonathan Barrera RD, LD,RD,LD

## 2021-08-09 NOTE — TELEPHONE ENCOUNTER
Last Appointment:  6/23/2021  Future Appointments   Date Time Provider Dandre Onofre   8/16/2021  2:00 PM [place]   8/17/2021  8:30 AM Kamini Wood MD [facility]   8/17/2021  8:50 AM [facility] CHAIR 02 MEDICAL ONCOLOGY [facility] MED ONC [place]   9/15/2021 10:40 AM Mari Martell MD [facility]   10/13/2021 10:15 AM [facility]EDU[facility]   10/13/2021 10:30 AM NAYLA Saldana 24 ENT [facility]

## 2021-08-09 NOTE — TELEPHONE ENCOUNTER
Benefits Investigation    Insurance: [employer]    Phone#:   ID#: [id number]   Group #:  Spoke with: Epic Registration  Reference #: 8/9/21 @ 11:25 am    Calendar Year : yes     Chemo Auth Needed: yes    Annual Deductible Amount:  $0  Amount met to date: [de-identified]  Out-of-Pocket Max Amount: $ 0  Amount met to date: $0  Lifetime Maximum Amount: $ unlimited  Amount met to date: $n/a    Patient will be covered at 100% of the allowed amount.  Electronically signed by Gilda Mireles on 8/9/2021 at 11:26 AM

## 2021-08-10 RX ORDER — ACETAMINOPHEN 325 MG/1
650 TABLET ORAL EVERY 6 HOURS PRN
COMMUNITY

## 2021-08-10 RX ORDER — HYDROCHLOROTHIAZIDE 12.5 MG/1
12.5 CAPSULE, GELATIN COATED ORAL DAILY
COMMUNITY
End: 2021-11-09

## 2021-08-10 RX ORDER — IBUPROFEN 200 MG
200 TABLET ORAL EVERY 6 HOURS PRN
COMMUNITY
End: 2021-10-29

## 2021-08-10 NOTE — PROGRESS NOTES
[address] [facility]                                                                                                                    PRE OP INSTRUCTIONS FOR  Ivy Livingston        Date: 8/10/2021    Date of surgery: 8/11/2021   Arrival Time: Hospital will call you between 5pm and 7pm with your final arrival time for surgery    1. Do not eat or drink anything after 12 prior to surgery. This includes no water, chewing gum, mints or ice chips. 2. Take the following medications with a small sip of water on the morning of Surgery: sertraline     3. Diabetics may take evening dose of insulin but none after midnight. If you feel symptomatic or low blood sugar morning of surgery drink 1-2 ounces of apple juice only. 4. Aspirin, Ibuprofen, Advil, Naproxen, Vitamin E and other Anti-inflammatory products should be stopped  before surgery  as directed by your physician. Take Tylenol only unless instructed otherwise by your surgeon. 5. Check with your Doctor regarding stopping Plavix, Coumadin, Lovenox, Eliquis, Effient, or other blood thinners. 6. Do not smoke,use illicit drugs and do not drink any alcoholic beverages 24 hours prior to surgery. 7. You may brush your teeth the morning of surgery. DO NOT SWALLOW WATER    8. You MUST make arrangements for a responsible adult to take you home after your surgery. You will not be allowed to leave alone or drive yourself home. It is strongly suggested someone stay with you the first 24 hrs. Your surgery will be cancelled if you do not have a ride home. 9. PEDIATRIC PATIENTS ONLY:  A parent/legal guardian must accompany a child scheduled for surgery and plan to stay at the hospital until the child is discharged. Please do not bring other children with you.     10. Please wear simple, loose fitting clothing to the hospital.  Jesúswellington Gonzalez not bring valuables (money, credit cards, checkbooks, etc.) Do not wear any makeup (including no eye makeup) or nail polish on your fingers or toes. 11. DO NOT wear any jewelry or piercings on day of surgery. All body piercing jewelry must be removed. 12. Shower the night before surgery with _x__Antibacterial soap /DOLLY WIPES________    13. TOTAL JOINT REPLACEMENT/HYSTERECTOMY PATIENTS ONLY---Remember to bring Blood Bank bracelet to the hospital on the day of surgery. 14. If you have a Living Will and Durable Power of  for Healthcare, please bring in a copy. 15. If appropriate bring crutches, inspirex, WALKER, CANE etc... 12. Notify your Surgeon if you develop any illness between now and surgery time, cough, cold, fever, sore throat, nausea, vomiting, etc.  Please notify your surgeon if you experience dizziness, shortness of breath or blurred vision between now & the time of your surgery. 17. If you have ___dentures, they will be removed before going to the OR; we will provide you a container. If you wear ___contact lenses or ___glasses, they will be removed; please bring a case for them. 18. To provide excellent care visitors will be limited to 2 in the room at any given time. 19. Please bring picture ID and insurance card. 20. Sleep apnea patients need to bring CPAP AND SETTINGS to hospital on day of surgery. 21. During flu season no children under the age of 15 are permitted in the hospital for the safety of all patients. 22. Other                 Please call AMBULATORY CARE if you have any further questions.    [address] [facility]     [address]

## 2021-08-11 ENCOUNTER — ANESTHESIA (OUTPATIENT)
Dept: OPERATING ROOM | Age: 61
End: 2021-08-11
Payer: COMMERCIAL

## 2021-08-11 ENCOUNTER — HOSPITAL ENCOUNTER (OUTPATIENT)
Dept: GENERAL RADIOLOGY | Age: 61
Discharge: HOME | End: 2021-08-13
Attending: SURGERY
Payer: COMMERCIAL

## 2021-08-11 ENCOUNTER — APPOINTMENT (OUTPATIENT)
Dept: GENERAL RADIOLOGY | Age: 61
End: 2021-08-11
Attending: SURGERY
Payer: COMMERCIAL

## 2021-08-11 ENCOUNTER — HOSPITAL ENCOUNTER (OUTPATIENT)
Age: 61
Setting detail: OUTPATIENT SURGERY
Discharge: HOME | End: 2021-08-11
Attending: SURGERY | Admitting: SURGERY
Payer: COMMERCIAL

## 2021-08-11 ENCOUNTER — ANESTHESIA EVENT (OUTPATIENT)
Dept: OPERATING ROOM | Age: 61
End: 2021-08-11
Payer: COMMERCIAL

## 2021-08-11 VITALS
OXYGEN SATURATION: 100 % | SYSTOLIC BLOOD PRESSURE: 111 MMHG | RESPIRATION RATE: 16 BRPM | DIASTOLIC BLOOD PRESSURE: 65 MMHG

## 2021-08-11 VITALS
RESPIRATION RATE: 16 BRPM | BODY MASS INDEX: 25.34 KG/M2 | WEIGHT: 143 LBS | OXYGEN SATURATION: 98 % | HEIGHT: 63 IN | DIASTOLIC BLOOD PRESSURE: 54 MMHG | SYSTOLIC BLOOD PRESSURE: 106 MMHG | HEART RATE: 64 BPM | TEMPERATURE: 98.2 F

## 2021-08-11 DIAGNOSIS — G89.18 POST-OP PAIN: Primary | ICD-10-CM

## 2021-08-11 DIAGNOSIS — I87.8 POOR VENOUS ACCESS: ICD-10-CM

## 2021-08-11 LAB
APTT: 28.4 SEC (ref 24.5–35.1)
INR BLD: 1.1
PROTHROMBIN TIME: 12.3 SEC (ref 9.3–12.4)

## 2021-08-11 PROCEDURE — 85610 PROTHROMBIN TIME: CPT

## 2021-08-11 PROCEDURE — C1881 DIALYSIS ACCESS SYSTEM: HCPCS | Performed by: SURGERY

## 2021-08-11 PROCEDURE — 85730 THROMBOPLASTIN TIME PARTIAL: CPT

## 2021-08-11 PROCEDURE — 7100000011 HC PHASE II RECOVERY - ADDTL 15 MIN: Performed by: SURGERY

## 2021-08-11 PROCEDURE — 6360000002 HC RX W HCPCS: Performed by: SURGERY

## 2021-08-11 PROCEDURE — 2500000003 HC RX 250 WO HCPCS: Performed by: SURGERY

## 2021-08-11 PROCEDURE — 3700000000 HC ANESTHESIA ATTENDED CARE: Performed by: SURGERY

## 2021-08-11 PROCEDURE — 6360000002 HC RX W HCPCS: Performed by: NURSE ANESTHETIST, CERTIFIED REGISTERED

## 2021-08-11 PROCEDURE — 99203 OFFICE O/P NEW LOW 30 MIN: CPT | Performed by: SURGERY

## 2021-08-11 PROCEDURE — 36415 COLL VENOUS BLD VENIPUNCTURE: CPT

## 2021-08-11 PROCEDURE — C1788 PORT, INDWELLING, IMP: HCPCS | Performed by: SURGERY

## 2021-08-11 PROCEDURE — 3700000001 HC ADD 15 MINUTES (ANESTHESIA): Performed by: SURGERY

## 2021-08-11 PROCEDURE — 3209999900 FLUORO FOR SURGICAL PROCEDURES

## 2021-08-11 PROCEDURE — 71045 X-RAY EXAM CHEST 1 VIEW: CPT

## 2021-08-11 PROCEDURE — 3600000013 HC SURGERY LEVEL 3 ADDTL 15MIN: Performed by: SURGERY

## 2021-08-11 PROCEDURE — 7100000010 HC PHASE II RECOVERY - FIRST 15 MIN: Performed by: SURGERY

## 2021-08-11 PROCEDURE — 2580000003 HC RX 258: Performed by: SURGERY

## 2021-08-11 PROCEDURE — 36561 INSERT TUNNELED CV CATH: CPT | Performed by: SURGERY

## 2021-08-11 PROCEDURE — 2709999900 HC NON-CHARGEABLE SUPPLY: Performed by: SURGERY

## 2021-08-11 PROCEDURE — 3600000003 HC SURGERY LEVEL 3 BASE: Performed by: SURGERY

## 2021-08-11 DEVICE — PORT INFUS OD2.7MM ID1.5MM INTRO 8FR TI POLYUR CATH DETACH CT80STPD] ANGIODYNAMICS INC]: Type: IMPLANTABLE DEVICE | Site: SUBCLAVIAN | Status: FUNCTIONAL

## 2021-08-11 RX ORDER — FENTANYL CITRATE 50 UG/ML
INJECTION, SOLUTION INTRAMUSCULAR; INTRAVENOUS PRN
Status: DISCONTINUED | OUTPATIENT
Start: 2021-08-11 | End: 2021-08-11 | Stop reason: SDUPTHER

## 2021-08-11 RX ORDER — TRAMADOL HYDROCHLORIDE 50 MG/1
50 TABLET ORAL EVERY 6 HOURS PRN
Qty: 12 TABLET | Refills: 0 | Status: SHIPPED | OUTPATIENT
Start: 2021-08-11 | End: 2021-08-14

## 2021-08-11 RX ORDER — HEPARIN SODIUM (PORCINE) LOCK FLUSH IV SOLN 100 UNIT/ML 100 UNIT/ML
SOLUTION INTRAVENOUS PRN
Status: DISCONTINUED | OUTPATIENT
Start: 2021-08-11 | End: 2021-08-11 | Stop reason: ALTCHOICE

## 2021-08-11 RX ORDER — MIDAZOLAM HYDROCHLORIDE 1 MG/ML
INJECTION INTRAMUSCULAR; INTRAVENOUS PRN
Status: DISCONTINUED | OUTPATIENT
Start: 2021-08-11 | End: 2021-08-11 | Stop reason: SDUPTHER

## 2021-08-11 RX ORDER — BUPIVACAINE HYDROCHLORIDE AND EPINEPHRINE 2.5; 5 MG/ML; UG/ML
INJECTION, SOLUTION EPIDURAL; INFILTRATION; INTRACAUDAL; PERINEURAL PRN
Status: DISCONTINUED | OUTPATIENT
Start: 2021-08-11 | End: 2021-08-11 | Stop reason: ALTCHOICE

## 2021-08-11 RX ORDER — SODIUM CHLORIDE, SODIUM LACTATE, POTASSIUM CHLORIDE, CALCIUM CHLORIDE 600; 310; 30; 20 MG/100ML; MG/100ML; MG/100ML; MG/100ML
INJECTION, SOLUTION INTRAVENOUS CONTINUOUS
Status: DISCONTINUED | OUTPATIENT
Start: 2021-08-11 | End: 2021-08-11 | Stop reason: HOSPADM

## 2021-08-11 RX ORDER — PROPOFOL 10 MG/ML
INJECTION, EMULSION INTRAVENOUS CONTINUOUS PRN
Status: DISCONTINUED | OUTPATIENT
Start: 2021-08-11 | End: 2021-08-11 | Stop reason: SDUPTHER

## 2021-08-11 RX ORDER — IBUPROFEN 800 MG/1
800 TABLET ORAL EVERY 6 HOURS PRN
Qty: 20 TABLET | Refills: 0 | Status: SHIPPED | OUTPATIENT
Start: 2021-08-11

## 2021-08-11 RX ORDER — SODIUM CHLORIDE 0.9 % (FLUSH) 0.9 %
5-40 SYRINGE (ML) INJECTION EVERY 12 HOURS SCHEDULED
Status: DISCONTINUED | OUTPATIENT
Start: 2021-08-11 | End: 2021-08-11 | Stop reason: HOSPADM

## 2021-08-11 RX ORDER — SODIUM CHLORIDE 0.9 % (FLUSH) 0.9 %
5-40 SYRINGE (ML) INJECTION PRN
Status: DISCONTINUED | OUTPATIENT
Start: 2021-08-11 | End: 2021-08-11 | Stop reason: HOSPADM

## 2021-08-11 RX ORDER — MEPERIDINE HYDROCHLORIDE 25 MG/ML
12.5 INJECTION INTRAMUSCULAR; INTRAVENOUS; SUBCUTANEOUS EVERY 5 MIN PRN
Status: DISCONTINUED | OUTPATIENT
Start: 2021-08-11 | End: 2021-08-11 | Stop reason: HOSPADM

## 2021-08-11 RX ORDER — SODIUM CHLORIDE 9 MG/ML
25 INJECTION, SOLUTION INTRAVENOUS PRN
Status: DISCONTINUED | OUTPATIENT
Start: 2021-08-11 | End: 2021-08-11 | Stop reason: HOSPADM

## 2021-08-11 RX ADMIN — MIDAZOLAM 2 MG: 1 INJECTION INTRAMUSCULAR; INTRAVENOUS at 10:05

## 2021-08-11 RX ADMIN — SODIUM CHLORIDE, POTASSIUM CHLORIDE, SODIUM LACTATE AND CALCIUM CHLORIDE: 600; 310; 30; 20 INJECTION, SOLUTION INTRAVENOUS at 08:00

## 2021-08-11 RX ADMIN — Medication 2000 MG: at 10:01

## 2021-08-11 RX ADMIN — FENTANYL CITRATE 50 MCG: 50 INJECTION, SOLUTION INTRAMUSCULAR; INTRAVENOUS at 10:08

## 2021-08-11 RX ADMIN — FENTANYL CITRATE 50 MCG: 50 INJECTION, SOLUTION INTRAMUSCULAR; INTRAVENOUS at 10:12

## 2021-08-11 RX ADMIN — PROPOFOL INJECTABLE EMULSION 100 MCG/KG/MIN: 10 INJECTION, EMULSION INTRAVENOUS at 10:08

## 2021-08-11 ASSESSMENT — PULMONARY FUNCTION TESTS
PIF_VALUE: 1
PIF_VALUE: 0
PIF_VALUE: 1
PIF_VALUE: 0
PIF_VALUE: 0
PIF_VALUE: 1
PIF_VALUE: 0
PIF_VALUE: 1
PIF_VALUE: 0
PIF_VALUE: 0
PIF_VALUE: 1
PIF_VALUE: 0
PIF_VALUE: 1

## 2021-08-11 ASSESSMENT — PAIN SCALES - GENERAL: PAINLEVEL_OUTOF10: 0

## 2021-08-11 ASSESSMENT — PAIN - FUNCTIONAL ASSESSMENT: PAIN_FUNCTIONAL_ASSESSMENT: 0-10

## 2021-08-11 ASSESSMENT — PAIN DESCRIPTION - DESCRIPTORS: DESCRIPTORS: CONSTANT;DISCOMFORT;PRESSURE

## 2021-08-11 ASSESSMENT — LIFESTYLE VARIABLES: SMOKING_STATUS: 1

## 2021-08-11 NOTE — H&P
Done day of due to covid 19 risk    General Surgery History and Physical    Patient's Name/Date of Birth: Kailey Cano / 1960    Date: August 11, 2021     Surgeon: Mariola Uriarte M.D.    PCP: Chester Calderon MD     Chief Complaint: poor venous access with rectal cancer     HPI:   Kailey Cano is a 62 y.o. female who presents for evaluation of cancer and poor venous access for port placement.        Past Medical History:   Diagnosis Date    Alcoholic ([facility]     Anxiety     [facility]     Chronic right shoulder pain     Convulsions/seizures ([facility] 09/16/2011    Depression     Depression 12/14/2020    GERD (gastroesophageal reflux disease)     Headache     HTN (hypertension) 06/16/2015    Mixed hyperlipidemia 02/17/2016    Osteoarthritis     Psychiatric problem     Substance abuse ([facility]     methadone       Past Surgical History:   Procedure Laterality Date    BREAST SURGERY  1988    augmentation silicone    COLON SURGERY  07/15/2021    ECTOPIC PREGNANCY SURGERY      TUBAL LIGATION  01/01/1993       Current Facility-Administered Medications   Medication Dose Route Frequency Provider Last Rate Last Admin    0.9 % sodium chloride infusion  25 mL Intravenous PRN Claudia Damon MD        ceFAZolin (ANCEF) 2000 mg in sterile water 20 mL IV syringe  2,000 mg Intravenous On Call to [address] [place] [address], MD        lactated ringers infusion   Intravenous Continuous Claudia Damon  mL/hr at 08/11/21 0800 New Bag at 08/11/21 0800    sodium chloride flush 0.9 % injection 5-40 mL  5-40 mL Intravenous 2 times per day Claudia Damon MD        sodium chloride flush 0.9 % injection 5-40 mL  5-40 mL Intravenous PRN Claudia Damon MD        meperidine (DEMEROL) injection 12.5 mg  12.5 mg Intravenous Q5 Min PRN Laura Jaime MD        HYDROmorphone (DILAUDID) injection 0.5 mg  0.5 mg Intravenous Q5 Min PRN Laura Jaime MD           No Known Allergies    The patient has a family history that is negative for severe cardiovascular or respiratory issues, negative for reaction to anesthesia. Social History     Socioeconomic History    Marital status:      Spouse name: Not on file    Number of children: Not on file    Years of education: Not on file    Highest education level: Not on file   Occupational History    Not on file   Tobacco Use    Smoking status: Current Every Day Smoker     Packs/day: 0.50     Years: 25.00     Pack years: 12.50     Types: Cigarettes     Start date: 12    Smokeless tobacco: Never Used   Vaping Use    Vaping Use: Never used   Substance and Sexual Activity    Alcohol use: No     Alcohol/week: 0.0 standard drinks     Comment: recovering alcoholic    Drug use: No     Comment: recovering addict    Sexual activity: Not Currently   Other Topics Concern    Not on file   Social History Narrative    Not on file     Social Determinants of Health     Financial Resource Strain: Low Risk     Difficulty of Paying Living Expenses: Not hard at all   Food Insecurity: No Food Insecurity    Worried About Running Out of Food in the Last Year: Never true    Pancho of Food in the Last Year: Never true   Transportation Needs:     Lack of Transportation (Medical):      Lack of Transportation (Non-Medical):    Physical Activity:     Days of Exercise per Week:     Minutes of Exercise per Session:    Stress:     Feeling of Stress :    Social Connections:     Frequency of Communication with Friends and Family:     Frequency of Social Gatherings with Friends and Family:     Attends Caodaism Services:     Active Member of Clubs or Organizations:     Attends Club or Organization Meetings:     Marital Status:    Intimate Partner Violence:     Fear of Current or Ex-Partner:     Emotionally Abused:     Physically Abused:     Sexually Abused:            Review of Systems  Review of Systems -  General ROS: negative for - chills, fatigue or malaise  ENT ROS: negative for - hearing change, nasal congestion or nasal discharge  Allergy and Immunology ROS: negative for - hives, itchy/watery eyes or nasal congestion  Hematological and Lymphatic ROS: negative for - blood clots, blood transfusions, bruising or fatigue  Endocrine ROS: negative for - malaise/lethargy, mood swings, palpitations or polydipsia/polyuria  Breast ROS: negative for - new or changing breast lumps or nipple changes  Respiratory ROS: negative for - sputum changes, stridor, tachypnea or wheezing  Cardiovascular ROS: negative for - irregular heartbeat, loss of consciousness, murmur or orthopnea  Gastrointestinal ROS: negative for - constipation, diarrhea, gas/bloating, heartburn or hematemesis  Genito-Urinary ROS: negative for -  genital discharge, genital ulcers or hematuria  Musculoskeletal ROS: negative for - gait disturbance, muscle pain or muscular weakness    Physical exam:   /60   Pulse 76   Temp 97.1 °F (36.2 °C) (Infrared)   Resp 16   Ht 5' 3\" (1.6 m)   Wt 143 lb (64.9 kg)   LMP 09/08/2011   SpO2 98%   BMI 25.33 kg/m²   General appearance:  NAD  Pyscho/social: negative for tremors and hallucinations  Head: NCAT, PERRLA, EOMI, red conjunctiva  Neck: supple, no masses  Lungs: CTAB, equal chest rise bilateral  Heart: Reg rate  Abdomen: soft, nontender, nondistended  Skin; no lesions  Gu: no cva tenderness  Extremities: extremities normal, atraumatic, no cyanosis or edema      Assessment:  62 y.o. female with poor venous access and rectal cancer for mediport     Plan: To OR for placement  Discussed the risk, benefits and alternatives of surgery including wound infections, bleeding, scar  and the risks of  anesthetic including MI, CVA, sudden death or reactions to anesthetic medications. The patient understands the risks and alternatives. All questions were answered to the patient's satisfaction and they freely signed the consent.     Mike Chen MD  8:24 AM  8/11/2021

## 2021-08-11 NOTE — OP NOTE
Colten Walter  1960      DATE OF PROCEDURE: 8/11/2021     SURGEON: Shira Jorgensen M.D.     ASSISTANT: derrick     PREOPERATIVE DIAGNOSIS: poor venous access and rectal ca. POSTOPERATIVE DIAGNOSIS: Same    OPERATION: Insertion of right subclavian central venous catheter with subcutaneous reservoir, fluoroscopy, failed left subclavian and IJ guidewire advancement     ANESTHESIA: LMAC     ESTIMATED BLOOD LOSS: Minimal     COMPLICATIONS: None    DESCRIPTION OF PROCEDURE: The patient was identified and the procedure was confirmed. The left side subclavian and IJ were cannulated and guidewire advanced but could not safely pass dilator so the port site was closed with vicryl. The right neck and chest were prepped and draped in the usual sterile fashion. Next, local anesthesic was used for local anesthesia. The right subclavian vein was percutaneously entered and a guidewire was advanced into the superior vena cava under fluoroscopic guidance. A small incision was made around the wire. A second skin incision was made below the clavicle and a pocket was made in the subcutaneous tissue for the reservoir. The catheter was pulled through a subcutaneous tunnel from the inferior chest incision to the access incision. The introducer was passed over the wire then the catheter was passed through the introducer and the tip was positioned in the superior vena cava right atrial junction under fluoroscopic guidance. The catheter was connected to the reservoir and the locking hub was engaged. The port was noted to withdrawal and flush blood easily and was flushed with heparinized saline solution. The reservoir was secured in the pocket with vicryl sutures. Hemostasis was assured and the incisions were irrigated. The incisions were approximated with Vicryl sutures and Dermabond was applied to the incisions in the operating room. Needle, sponge, and instrument counts were reported as correct times two.   The patient tolerated the procedure and was transferred back to the floor in satisfactory condition.        Alice Mims MD  10:49 AM  8/11/2021

## 2021-08-11 NOTE — ANESTHESIA POSTPROCEDURE EVALUATION
Department of Anesthesiology  Postprocedure Note    Patient: Elizabeth Staples  MRN: [id number]  YOB: 1960  Date of evaluation: 8/11/2021  Time:  1:16 PM     Procedure Summary     Date: 08/11/21 Room / Location: [address] [place] [address] 06 / [facility]    Anesthesia Start: 9013 Anesthesia Stop: 2167    Procedure: MEDIPORT INSERTION (N/A Chest) Diagnosis: (POOR VENOUS ACCESS)    Surgeons: Sarina Moore MD Responsible Provider: Steven Zhang MD    Anesthesia Type: MAC ASA Status: 3          Anesthesia Type: MAC    Mirella Phase I: Mirella Score: 10    Mirella Phase II: Mirella Score: 10    Last vitals: Reviewed and per EMR flowsheets.        Anesthesia Post Evaluation    Patient location during evaluation: PACU  Patient participation: complete - patient participated  Level of consciousness: awake  Pain score: 0  Airway patency: patent  Nausea & Vomiting: no nausea  Complications: no  Cardiovascular status: blood pressure returned to baseline  Respiratory status: acceptable  Hydration status: euvolemic

## 2021-08-11 NOTE — ANESTHESIA PRE PROCEDURE
Department of Anesthesiology  Preprocedure Note       Name:  Kailey Cano   Age:  62 y.o.  :  1960                                          MRN:  [id number]         Date:  2021      Surgeon: Shira Thomas):  Claudia Damon MD    Procedure: Procedure(s): MEDIPORT INSERTION    Medications prior to admission:   Prior to Admission medications    Medication Sig Start Date End Date Taking?  Authorizing Provider   acetaminophen (TYLENOL) 325 MG tablet Take 650 mg by mouth every 6 hours as needed for Pain   Yes Historical Provider, MD   ibuprofen (ADVIL;MOTRIN) 200 MG tablet Take 200 mg by mouth every 6 hours as needed for Pain   Yes Historical Provider, MD   hydroCHLOROthiazide (MICROZIDE) 12.5 MG capsule Take 12.5 mg by mouth daily   Yes Historical Provider, MD   sertraline (ZOLOFT) 100 MG tablet TAKE 1 TABLET BY MOUTH ONCE EVERY DAY 21  Yes Chester Calderon MD   omeprazole (PRILOSEC) 20 MG delayed release capsule Take 1 capsule by mouth daily  Patient taking differently: Take 20 mg by mouth as needed  21  Yes Chester Calderon MD   atorvastatin (LIPITOR) 10 MG tablet Take 1 tablet by mouth daily 10/6/20  Yes Chester Calderon MD       Current medications:    Current Facility-Administered Medications   Medication Dose Route Frequency Provider Last Rate Last Admin    0.9 % sodium chloride infusion  25 mL Intravenous PRN Claudia Damon MD        ceFAZolin (ANCEF) 2000 mg in sterile water 20 mL IV syringe  2,000 mg Intravenous On Call to [address], MD        lactated ringers infusion   Intravenous Continuous Claudia Damon MD        sodium chloride flush 0.9 % injection 5-40 mL  5-40 mL Intravenous 2 times per day Claudia Damon MD        sodium chloride flush 0.9 % injection 5-40 mL  5-40 mL Intravenous PRN Claudia Damon MD           Allergies:  No Known Allergies    Problem List:    Patient Active Problem List   Diagnosis Code    Convulsions/seizures ([facility] R56.9    Alcohol Time of last solid consumption: 1900                        Date of last liquid consumption: 08/10/21                        Date of last solid food consumption: 08/10/21    BMI:   Wt Readings from Last 3 Encounters:   08/11/21 143 lb (64.9 kg)   08/06/21 144 lb 14.4 oz (65.7 kg)   06/23/21 159 lb (72.1 kg)     Body mass index is 25.33 kg/m². CBC:   Lab Results   Component Value Date    WBC 10.1 08/06/2021    RBC 4.23 08/06/2021    HGB 13.0 08/06/2021    HCT 39.2 08/06/2021    MCV 92.7 08/06/2021    RDW 12.1 08/06/2021     08/06/2021       CMP:   Lab Results   Component Value Date     08/06/2021    K 3.5 08/06/2021     08/06/2021    CO2 26 08/06/2021    BUN 11 08/06/2021    CREATININE 0.6 08/06/2021    GFRAA >60 08/06/2021    LABGLOM >60 08/06/2021    GLUCOSE 105 08/06/2021    GLUCOSE 131 09/19/2011    PROT 7.4 08/06/2021    CALCIUM 9.2 08/06/2021    BILITOT 0.3 08/06/2021    ALKPHOS 212 08/06/2021    AST 20 08/06/2021    ALT 9 08/06/2021       POC Tests: No results for input(s): POCGLU, POCNA, POCK, POCCL, POCBUN, POCHEMO, POCHCT in the last 72 hours.     Coags:   Lab Results   Component Value Date    PROTIME 10.4 09/15/2014    PROTIME 12.6 09/16/2011    INR 1.0 09/15/2014    APTT 29.4 09/15/2014       HCG (If Applicable):   Lab Results   Component Value Date    PREGTESTUR NEGATIVE 06/16/2021        ABGs: No results found for: PHART, PO2ART, LLB3YFK, YHI7WHQ, BEART, M2DCANWQ     Type & Screen (If Applicable):  No results found for: LABABO, LABRH    Drug/Infectious Status (If Applicable):  Lab Results   Component Value Date    HEPCAB NON REACT 09/16/2011       COVID-19 Screening (If Applicable): No results found for: COVID19        Anesthesia Evaluation  Patient summary reviewed  Airway: Mallampati: I  TM distance: >3 FB   Neck ROM: full  Mouth opening: > = 3 FB Dental:          Pulmonary: breath sounds clear to auscultation  (+) current smoker (0.5 PPD.)                          ROS comment: Substance Abuse. Cardiovascular:    (+) hyperlipidemia        Rhythm: regular  Rate: normal                    Neuro/Psych:   (+) seizures:, headaches:, psychiatric history:depression/anxiety              ROS comment: Ch Rt Shoulder Pain. GI/Hepatic/Renal:   (+) GERD:,          ROS comment: ETOH Abuse. Colon Surgery. .   Endo/Other:    (+) : arthritis: OA., malignancy/cancer. Abdominal:             Vascular: negative vascular ROS. Other Findings: Very Rotten Teeth. Anesthesia Plan      MAC     ASA 3             Anesthetic plan and risks discussed with patient. Plan discussed with CRNA.     Attending anesthesiologist reviewed and agrees with Marielena De León MD   8/11/2021

## 2021-08-16 ENCOUNTER — HOSPITAL ENCOUNTER (OUTPATIENT)
Dept: INFUSION THERAPY | Age: 61
Discharge: HOME | End: 2021-08-16
Payer: COMMERCIAL

## 2021-08-16 DIAGNOSIS — C20 RECTAL CANCER (HCC): Primary | ICD-10-CM

## 2021-08-16 DIAGNOSIS — C20 RECTAL CANCER (HCC): ICD-10-CM

## 2021-08-16 LAB
ALBUMIN SERPL-MCNC: 3.8 G/DL (ref 3.5–5.2)
ALP BLD-CCNC: 229 U/L (ref 35–104)
ALT SERPL-CCNC: 13 U/L (ref 0–32)
ANION GAP SERPL CALCULATED.3IONS-SCNC: 14 MMOL/L (ref 7–16)
AST SERPL-CCNC: 25 U/L (ref 0–31)
BASOPHILS ABSOLUTE: 0.06 E9/L (ref 0–0.2)
BASOPHILS RELATIVE PERCENT: 0.4 % (ref 0–2)
BILIRUB SERPL-MCNC: 0.2 MG/DL (ref 0–1.2)
BUN BLDV-MCNC: 9 MG/DL (ref 6–20)
CALCIUM SERPL-MCNC: 9.7 MG/DL (ref 8.6–10.2)
CEA: 22.7 NG/ML (ref 0–5.2)
CHLORIDE BLD-SCNC: 98 MMOL/L (ref 98–107)
CO2: 24 MMOL/L (ref 22–29)
CREAT SERPL-MCNC: 0.5 MG/DL (ref 0.5–1)
EOSINOPHILS ABSOLUTE: 0.17 E9/L (ref 0.05–0.5)
EOSINOPHILS RELATIVE PERCENT: 1.2 % (ref 0–6)
GFR AFRICAN AMERICAN: >60
GFR NON-AFRICAN AMERICAN: >60 ML/MIN/1.73
GLUCOSE BLD-MCNC: 96 MG/DL (ref 74–99)
HCT VFR BLD CALC: 35 % (ref 34–48)
HEMOGLOBIN: 11.9 G/DL (ref 11.5–15.5)
IMMATURE GRANULOCYTES #: 0.09 E9/L
IMMATURE GRANULOCYTES %: 0.6 % (ref 0–5)
LYMPHOCYTES ABSOLUTE: 2.95 E9/L (ref 1.5–4)
LYMPHOCYTES RELATIVE PERCENT: 20.2 % (ref 20–42)
MCH RBC QN AUTO: 30.8 PG (ref 26–35)
MCHC RBC AUTO-ENTMCNC: 34 % (ref 32–34.5)
MCV RBC AUTO: 90.7 FL (ref 80–99.9)
MONOCYTES ABSOLUTE: 0.9 E9/L (ref 0.1–0.95)
MONOCYTES RELATIVE PERCENT: 6.2 % (ref 2–12)
NEUTROPHILS ABSOLUTE: 10.42 E9/L (ref 1.8–7.3)
NEUTROPHILS RELATIVE PERCENT: 71.4 % (ref 43–80)
PDW BLD-RTO: 12.2 FL (ref 11.5–15)
PLATELET # BLD: 386 E9/L (ref 130–450)
PMV BLD AUTO: 9.6 FL (ref 7–12)
POTASSIUM SERPL-SCNC: 3.7 MMOL/L (ref 3.5–5)
RBC # BLD: 3.86 E12/L (ref 3.5–5.5)
SODIUM BLD-SCNC: 136 MMOL/L (ref 132–146)
TOTAL PROTEIN: 7.8 G/DL (ref 6.4–8.3)
WBC # BLD: 14.6 E9/L (ref 4.5–11.5)

## 2021-08-16 PROCEDURE — 99213 OFFICE O/P EST LOW 20 MIN: CPT

## 2021-08-16 PROCEDURE — 80053 COMPREHEN METABOLIC PANEL: CPT

## 2021-08-16 PROCEDURE — 85025 COMPLETE CBC W/AUTO DIFF WBC: CPT

## 2021-08-16 PROCEDURE — 82378 CARCINOEMBRYONIC ANTIGEN: CPT

## 2021-08-16 PROCEDURE — 99195 PHLEBOTOMY: CPT

## 2021-08-16 PROCEDURE — 99214 OFFICE O/P EST MOD 30 MIN: CPT

## 2021-08-16 RX ORDER — PROCHLORPERAZINE MALEATE 10 MG
10 TABLET ORAL EVERY 6 HOURS PRN
Qty: 40 TABLET | Refills: 3 | Status: SHIPPED | OUTPATIENT
Start: 2021-08-16

## 2021-08-16 RX ORDER — SODIUM CHLORIDE 0.9 % (FLUSH) 0.9 %
5-40 SYRINGE (ML) INJECTION PRN
Status: CANCELLED | OUTPATIENT
Start: 2021-08-16

## 2021-08-16 RX ORDER — SODIUM CHLORIDE 9 MG/ML
25 INJECTION, SOLUTION INTRAVENOUS PRN
Status: CANCELLED | OUTPATIENT
Start: 2021-08-16

## 2021-08-16 RX ORDER — PROCHLORPERAZINE MALEATE 10 MG
10 TABLET ORAL EVERY 6 HOURS PRN
Qty: 40 TABLET | Refills: 3 | Status: SHIPPED | OUTPATIENT
Start: 2021-08-16 | End: 2021-08-16

## 2021-08-16 RX ORDER — HEPARIN SODIUM (PORCINE) LOCK FLUSH IV SOLN 100 UNIT/ML 100 UNIT/ML
500 SOLUTION INTRAVENOUS PRN
Status: CANCELLED | OUTPATIENT
Start: 2021-08-16

## 2021-08-16 NOTE — PROGRESS NOTES
Spoke with patient about treatment medications (bevacizumab, oxaliplatin, leucovorin and flourouracil). We went over the protocol to be used, what to expect as far as side effects, and when to call with problems. This was intended to reinforce the education done by Dr. Meron Gu. Prescriptions were sent to patient's local pharmacy for prochlorperazine. Patient was provided medication handout to take home and patient was told to call if there are any questions once they had a chance to absorb the information. Patient had the opportunity to ask questions with all questions being answered to their satisfaction. The patient expressed understanding and acceptance of instructions.     [place], [place] 8/16/2021 2:50 PM

## 2021-08-16 NOTE — PROGRESS NOTES
WHAT YOU SHOULD KNOW AFTER CHEMOTHERAPY  PAIN:  If you received NEULASTA (for keeping white blood cells elevated) injection, you may have pain in your bones and feel achy. You can take Tylenol as directed for pain. Do not exceed 3,000 mg in a 24 hour period. DIFFICULTY FALLING ASLEEP: You may take acetaminophen with diphenhydramine (Tylenol PM) 1-2 tablets at bedtime or diphenhydramine 25-50 mg at bedtime. If you use acetaminophen with diphenhydramine, do not take any additional Tylenol within 8 hours of using this product. FEELING TIRED:  Fatigue caused by treatment is temporary. If you feel tired, be sure to plan rest periods to save energy. Attempt to follow your usual routine if possible, but rest when you feel tired. Accept help when offered. NAUSEA:  Preventing nausea is especially important in the first 48 hours after chemo.  Take one Ondansetron (Zofran) tonight before bedtime    Take one Ondansetron every eight hours for two days after chemotherapy. After two days, you may take this every 8 hours as needed. At any hint of nausea, take medication immediately.  If you have nausea/vomiting in between the 8 hour interval of the Ondansetron, you may take the prochlorperazine (Compazine) every 8 hours in between doses of Ondansetron. DIET:  You may eat your usual diet. However, you should avoid eating uncooked foods such as sushi, buffets, or situations in which you are unsure how the food was prepared. You may eat in restaurants, but order from the menu instead of eating from the buffet. Please call the dietitian for any specific dietary concerns or questions. MOUTH CARE:  Good oral hygiene is a must!  Be sure to brush teeth/dentures frequently. Rinse mouth often using a solution of:  1 teaspoon baking soda  1 cup of warm water  Mix well to dissolve the baking soda.  This is a good rinse to use before and after a meal because it may soothe any oral pain you have, making it easier for you to eat well. Swish and spit, but don't swallow this mixture. HYDRATION:  Fluids are important to flush the chemotherapy from your system. Drink 8-10, 8oz servings of fluid per day (water, jell-o, juice, broth, soda, milk, Gatorade); If having diarrhea or vomiting, increase fluids by at least one serving for each episode. CONSTIPATION:   Take Senekot (generic is okay) or Senekot-S. Start with 1-2 tablets in the evening. You may add 2 tablets in the morning and evening according to package directions. If no Bowel movement in 24-48 hours, take a dose of MOM or Miralax. If still no BM, or this is associated with nausea/vomiting, or abdominal distention, notify physician office. DIARRHEA:  Imodium is taken at the first sign of diarrhea. The first dose of Imodium is usually two tablets after the first episode of diarrhea and then 1 tab for each episode after, not to exceed 4 tabs in 24 hours unless instructed by your health care team.   FLUSHING:  Your face may appear flushed due to the steroid you were given during your chemotherapy treatment and should not last more than 24-48 hours. If you are a diabetic, be sure to monitor your blood sugar levels for the first 24 hours, as the steroid you were given can elevate your glucose levels. CALL OFFICE IMMEDIATELY OR REPORT TO NEAREST EMERGENCY ROOM FOR:  · A Fever over 100.5F or Temp of 100.0 Fever lasting for longer than 8 hours (you should take your temperature twice a day during chemotherapy). · Shaking or Chills with no fever. · Uncontrolled nausea, vomiting, diarrhea, or constipation. · Pain at the injection site or any uncontrolled pain.   · You are too tired to get out of bed for 24 hours  · You are feeling confused or unable to think clearly  · Fatigue becomes worse every day  · You feel short of breath or you are having hard time breathing  KEEPING YOU AND YOUR LOVED ONES SAFE      It takes about 48 hours for your body to break down and/or get rid of most chemotherapy drugs. During this time, a small amount of chemotherapy comes out in your urine, stool, and vomit. ALWAYS wear gloves when handling linen, blood, urine, stool, or emesis. Dispose of the gloves after each use and wash your hands. · If using a bedpan for body wastes or a container for vomiting, be careful not to splash or spill the contents while you are emptying them into the toilet. If the container used not disposable, rinse it with dishwashing or laundry detergent and water, and put the rinse water in the toilet. Flush the toilet with the lid down. Any sink or basin that is used for vomiting should be rinsed with dishwashing or laundry detergent and rinsed with water. Dry with paper towels and discard towels. · Wash clothing or bed linens that have body wastes on them with laundry detergent and hot water, separately from the other laundry, as soon as possible. If you are unable to wash them immediately, place them in a sealed plastic bag until they can be washed.    FLUSH TOILET TWICE:  If you have low volume toilets, be sure to flush toilets twice each time they are used. If possible, patients should use a separate toilet from others in the home. [address] YOUR HANDS: After using any devices for bodily waste, patients should thoroughly wash their hands and the devices with soap and water. Dry the devices with paper towels, and discard the towels. INTIMACY:  If you are sexually active, you should abstain from sexual activity or practice barrier method for the first 72 hours after chemotherapy in order to avoid exposure of your partner to the chemotherapy. Hugging and kissing (no intimate kissing) are safe. If you have any questions regarding the above instructions, please ask your Doctor or Nurse.

## 2021-08-16 NOTE — PROGRESS NOTES
CHEMOTHERAPY TEACHING    Chemotherapy teaching performed today for patient and . The teaching included:    1. Rationale for chemotherapy    2. Actions of chemotherapy    3. Actions of pre and/or post chemotherapy medications    4. Administration plan: approximate length of treatment and interval between doses. A.  Chemotherapeutic Agents:  Bevacizumab, oxaliplatin, 5 -FU, leucovorin, aloxi, decadron, 5 FU pump    5. Management of side effects:     A. Nausea and vomiting:  · Eat light the day of treatment  · Dietary alterations: no greasy or spicy foods. Stay away from favorites. B.  Alopecia:  · Obtain hairpiece prior to hair loss  · Alternatives to wigs  C. Bone Marrow Depression:  · Frequent CBC - Gene count (lab time prior to appointment with doctor)   D.  WBC:  · Function and recovery  · Precautionary measures when low (temperatures BID - avoid ill people/crowds)  E. Hemoglobin:  · Function and recovery  · Signs/symptoms if low  · Possibility of transfusion  F.  Platelets:  · Function and recovery  · Signs/symptoms if low    6. Mental status changes post chemotherapy:  A. Patient will need a  after 1st treatment (pre-meds)  B. Encourage family to stay with patient 24 hours post treatment. 7.  Sexuality and Reproduction:   A. Teratogenic effects of chemotherapy (prevent pregnancy during treatment                     and at least 2 months post therapy). B. Sperm banking (young males). 8.  Encourage patient to call clinic with questions. 9.  Copy of home going instructions    10. Written consent obtained  11. Patient viewed chemotherapy teaching DVD \"Understanding Chemotherapy\" by the          [facility]. After answering the patient's questions, the patient received \"Chemotherapy and You\" booklet. A thermometer was given to the patient. 1 hour of teaching time.        Melinda Lozoya RN  8/16/2021

## 2021-08-17 ENCOUNTER — HOSPITAL ENCOUNTER (OUTPATIENT)
Dept: INFUSION THERAPY | Age: 61
Discharge: HOME | End: 2021-08-17
Payer: COMMERCIAL

## 2021-08-17 ENCOUNTER — TELEPHONE (OUTPATIENT)
Dept: CASE MANAGEMENT | Age: 61
End: 2021-08-17

## 2021-08-17 ENCOUNTER — OFFICE VISIT (OUTPATIENT)
Dept: ONCOLOGY | Age: 61
End: 2021-08-17
Payer: COMMERCIAL

## 2021-08-17 VITALS
TEMPERATURE: 98.7 F | SYSTOLIC BLOOD PRESSURE: 126 MMHG | OXYGEN SATURATION: 98 % | WEIGHT: 144.7 LBS | HEIGHT: 64 IN | BODY MASS INDEX: 24.7 KG/M2 | RESPIRATION RATE: 18 BRPM | DIASTOLIC BLOOD PRESSURE: 53 MMHG | HEART RATE: 93 BPM

## 2021-08-17 VITALS — RESPIRATION RATE: 12 BRPM | HEART RATE: 65 BPM | DIASTOLIC BLOOD PRESSURE: 64 MMHG | SYSTOLIC BLOOD PRESSURE: 124 MMHG

## 2021-08-17 DIAGNOSIS — C20 RECTAL CANCER (HCC): Primary | ICD-10-CM

## 2021-08-17 PROCEDURE — 96368 THER/DIAG CONCURRENT INF: CPT

## 2021-08-17 PROCEDURE — 6370000000 HC RX 637 (ALT 250 FOR IP): Performed by: INTERNAL MEDICINE

## 2021-08-17 PROCEDURE — 2580000003 HC RX 258: Performed by: INTERNAL MEDICINE

## 2021-08-17 PROCEDURE — 3017F COLORECTAL CA SCREEN DOC REV: CPT | Performed by: INTERNAL MEDICINE

## 2021-08-17 PROCEDURE — 4004F PT TOBACCO SCREEN RCVD TLK: CPT | Performed by: INTERNAL MEDICINE

## 2021-08-17 PROCEDURE — 96416 CHEMO PROLONG INFUSE W/PUMP: CPT

## 2021-08-17 PROCEDURE — 2580000003 HC RX 258

## 2021-08-17 PROCEDURE — 6360000002 HC RX W HCPCS: Performed by: INTERNAL MEDICINE

## 2021-08-17 PROCEDURE — G8427 DOCREV CUR MEDS BY ELIG CLIN: HCPCS | Performed by: INTERNAL MEDICINE

## 2021-08-17 PROCEDURE — 96415 CHEMO IV INFUSION ADDL HR: CPT

## 2021-08-17 PROCEDURE — 96411 CHEMO IV PUSH ADDL DRUG: CPT

## 2021-08-17 PROCEDURE — 96375 TX/PRO/DX INJ NEW DRUG ADDON: CPT

## 2021-08-17 PROCEDURE — 99214 OFFICE O/P EST MOD 30 MIN: CPT | Performed by: INTERNAL MEDICINE

## 2021-08-17 PROCEDURE — G8419 CALC BMI OUT NRM PARAM NOF/U: HCPCS | Performed by: INTERNAL MEDICINE

## 2021-08-17 PROCEDURE — 96417 CHEMO IV INFUS EACH ADDL SEQ: CPT

## 2021-08-17 PROCEDURE — 96413 CHEMO IV INFUSION 1 HR: CPT

## 2021-08-17 RX ORDER — SODIUM CHLORIDE 0.9 % (FLUSH) 0.9 %
5-40 SYRINGE (ML) INJECTION PRN
Status: CANCELLED | OUTPATIENT
Start: 2021-08-17

## 2021-08-17 RX ORDER — SODIUM CHLORIDE 9 MG/ML
INJECTION, SOLUTION INTRAVENOUS ONCE
Status: CANCELLED | OUTPATIENT
Start: 2021-08-17 | End: 2021-08-17

## 2021-08-17 RX ORDER — MEPERIDINE HYDROCHLORIDE 25 MG/ML
12.5 INJECTION INTRAMUSCULAR; INTRAVENOUS; SUBCUTANEOUS ONCE
Status: CANCELLED | OUTPATIENT
Start: 2021-08-17 | End: 2021-08-17

## 2021-08-17 RX ORDER — ACETAMINOPHEN 325 MG/1
TABLET ORAL
Status: DISPENSED
Start: 2021-08-17 | End: 2021-08-17

## 2021-08-17 RX ORDER — DEXAMETHASONE SODIUM PHOSPHATE 10 MG/ML
10 INJECTION INTRAMUSCULAR; INTRAVENOUS ONCE
Status: COMPLETED | OUTPATIENT
Start: 2021-08-17 | End: 2021-08-17

## 2021-08-17 RX ORDER — DEXTROSE MONOHYDRATE 50 MG/ML
INJECTION, SOLUTION INTRAVENOUS
Status: COMPLETED
Start: 2021-08-17 | End: 2021-08-17

## 2021-08-17 RX ORDER — FLUOROURACIL 50 MG/ML
400 INJECTION, SOLUTION INTRAVENOUS ONCE
Status: CANCELLED | OUTPATIENT
Start: 2021-08-17

## 2021-08-17 RX ORDER — PALONOSETRON HYDROCHLORIDE 0.05 MG/ML
INJECTION, SOLUTION INTRAVENOUS
Status: DISPENSED
Start: 2021-08-17 | End: 2021-08-17

## 2021-08-17 RX ORDER — SODIUM CHLORIDE 0.9 % (FLUSH) 0.9 %
5-40 SYRINGE (ML) INJECTION PRN
Status: DISCONTINUED | OUTPATIENT
Start: 2021-08-17 | End: 2021-08-18 | Stop reason: HOSPADM

## 2021-08-17 RX ORDER — PALONOSETRON HYDROCHLORIDE 0.05 MG/ML
0.25 INJECTION, SOLUTION INTRAVENOUS ONCE
Status: COMPLETED | OUTPATIENT
Start: 2021-08-17 | End: 2021-08-17

## 2021-08-17 RX ORDER — EPINEPHRINE 1 MG/ML
0.3 INJECTION, SOLUTION, CONCENTRATE INTRAVENOUS PRN
Status: CANCELLED | OUTPATIENT
Start: 2021-08-17

## 2021-08-17 RX ORDER — HEPARIN SODIUM (PORCINE) LOCK FLUSH IV SOLN 100 UNIT/ML 100 UNIT/ML
500 SOLUTION INTRAVENOUS PRN
Status: DISCONTINUED | OUTPATIENT
Start: 2021-08-17 | End: 2021-08-18 | Stop reason: HOSPADM

## 2021-08-17 RX ORDER — DEXTROSE MONOHYDRATE 50 MG/ML
250 INJECTION, SOLUTION INTRAVENOUS ONCE
Status: COMPLETED | OUTPATIENT
Start: 2021-08-17 | End: 2021-08-17

## 2021-08-17 RX ORDER — DIPHENHYDRAMINE HYDROCHLORIDE 50 MG/ML
50 INJECTION INTRAMUSCULAR; INTRAVENOUS ONCE
Status: CANCELLED | OUTPATIENT
Start: 2021-08-17 | End: 2021-08-17

## 2021-08-17 RX ORDER — SODIUM CHLORIDE 9 MG/ML
INJECTION, SOLUTION INTRAVENOUS CONTINUOUS
Status: CANCELLED | OUTPATIENT
Start: 2021-08-17

## 2021-08-17 RX ORDER — FLUOROURACIL 50 MG/ML
400 INJECTION, SOLUTION INTRAVENOUS ONCE
Status: COMPLETED | OUTPATIENT
Start: 2021-08-17 | End: 2021-08-17

## 2021-08-17 RX ORDER — DEXTROSE MONOHYDRATE 50 MG/ML
INJECTION, SOLUTION INTRAVENOUS ONCE
Status: CANCELLED | OUTPATIENT
Start: 2021-08-17 | End: 2021-08-17

## 2021-08-17 RX ORDER — ACETAMINOPHEN 325 MG/1
650 TABLET ORAL EVERY 4 HOURS PRN
Status: DISCONTINUED | OUTPATIENT
Start: 2021-08-17 | End: 2021-08-18 | Stop reason: HOSPADM

## 2021-08-17 RX ORDER — HEPARIN SODIUM (PORCINE) LOCK FLUSH IV SOLN 100 UNIT/ML 100 UNIT/ML
500 SOLUTION INTRAVENOUS PRN
Status: CANCELLED | OUTPATIENT
Start: 2021-08-17

## 2021-08-17 RX ORDER — DEXAMETHASONE SODIUM PHOSPHATE 10 MG/ML
INJECTION INTRAMUSCULAR; INTRAVENOUS
Status: DISPENSED
Start: 2021-08-17 | End: 2021-08-17

## 2021-08-17 RX ORDER — METHYLPREDNISOLONE SODIUM SUCCINATE 125 MG/2ML
125 INJECTION, POWDER, LYOPHILIZED, FOR SOLUTION INTRAMUSCULAR; INTRAVENOUS ONCE
Status: CANCELLED | OUTPATIENT
Start: 2021-08-17 | End: 2021-08-17

## 2021-08-17 RX ORDER — SODIUM CHLORIDE 9 MG/ML
25 INJECTION, SOLUTION INTRAVENOUS PRN
Status: CANCELLED | OUTPATIENT
Start: 2021-08-17

## 2021-08-17 RX ORDER — PALONOSETRON HYDROCHLORIDE 0.05 MG/ML
0.25 INJECTION, SOLUTION INTRAVENOUS ONCE
Status: CANCELLED | OUTPATIENT
Start: 2021-08-17

## 2021-08-17 RX ORDER — DEXAMETHASONE SODIUM PHOSPHATE 10 MG/ML
10 INJECTION, SOLUTION INTRAMUSCULAR; INTRAVENOUS ONCE
Status: CANCELLED | OUTPATIENT
Start: 2021-08-17

## 2021-08-17 RX ORDER — SODIUM CHLORIDE 9 MG/ML
INJECTION, SOLUTION INTRAVENOUS ONCE
Status: COMPLETED | OUTPATIENT
Start: 2021-08-17 | End: 2021-08-17

## 2021-08-17 RX ADMIN — DEXAMETHASONE SODIUM PHOSPHATE 10 MG: 10 INJECTION INTRAMUSCULAR; INTRAVENOUS at 09:29

## 2021-08-17 RX ADMIN — DEXTROSE MONOHYDRATE: 5 INJECTION, SOLUTION INTRAVENOUS at 10:28

## 2021-08-17 RX ADMIN — SODIUM CHLORIDE: 9 INJECTION, SOLUTION INTRAVENOUS at 09:26

## 2021-08-17 RX ADMIN — ACETAMINOPHEN 650 MG: 325 TABLET ORAL at 09:39

## 2021-08-17 RX ADMIN — DEXTROSE MONOHYDRATE: 50 INJECTION, SOLUTION INTRAVENOUS at 10:28

## 2021-08-17 RX ADMIN — FLUOROURACIL 700 MG: 50 INJECTION, SOLUTION INTRAVENOUS at 12:35

## 2021-08-17 RX ADMIN — SODIUM CHLORIDE 300 MG: 900 INJECTION, SOLUTION INTRAVENOUS at 09:53

## 2021-08-17 RX ADMIN — Medication 10 ML: at 09:27

## 2021-08-17 RX ADMIN — OXALIPLATIN 145 MG: 5 INJECTION, SOLUTION INTRAVENOUS at 10:28

## 2021-08-17 RX ADMIN — LEUCOVORIN CALCIUM 700 MG: 10 INJECTION INTRAMUSCULAR; INTRAVENOUS at 10:32

## 2021-08-17 RX ADMIN — PALONOSETRON 0.25 MG: 0.25 INJECTION, SOLUTION INTRAVENOUS at 09:29

## 2021-08-17 ASSESSMENT — PAIN SCALES - GENERAL: PAINLEVEL_OUTOF10: 3

## 2021-08-17 NOTE — TELEPHONE ENCOUNTER
Met with patient and her  in the treatment room during her first chemotherapy treatment for follow up. Patient remembers me from her initial consultation appointment at the center. Patient appears well and is in good spirits. States that she is doing alright with the treatment so far and had her chemotherapy teaching yesterday. Reports issues with rectal pain and pressure. She is sitting on a pillow currently. Discussed additional support with a donut pillow and rotating laying on her sides or back while at home. Discussed Palliative care services for symptom management to assist.  Patient is agreeable to referral.  Reports eating and sleeping well. Upon inquiring, states that her ostomy is working well without any issues. Provided support and encouragement that the pain will hopefully improve with her treatments. Her  brought for treatment today. Denies any current needs for assistance from NN. Patient appreciative of visit and states that all staff have been treating her wonderfully. Will continue to follow as needed. Flori Bryant, KARLAW, RN, OCN  Oncology Nurse Navigator

## 2021-08-17 NOTE — PROGRESS NOTES
Pt / significant other provided with review of chemotherapy to be administered, expected side effects and actions to take. Answered questions to their apparent satisfaction. Encouraged to call with questions/concerns. The patient is given an explanation of how to use her spill kit at home, the patient and  give a good return demo on how to manage a spill at home. The patient is given an overview of cadd pump opation at home and provided with a list of numbers to call in the event a problem arises with the cadd pump. The patient is instructed on the use of nausea medicne and diarrhea meds at home as well management of constipation, The patient feels confident and ready to go home with her 5 FU pump.

## 2021-08-17 NOTE — PROGRESS NOTES
Department of [place] [address]   Attending Clinic Note    Reason for Visit: Follow-up on a patient with Rectal cancer    PCP:  Marycarmen Goyal MD    History of Present Illness:  61 y/o female who was complaining of diarrhea blood in stool and painful defecation    CT abdomen/pelvis 06/16/2021:  Circumferentially infiltrative mass in the upper rectum, consistent with malignancy  Metastatic lymph nodes in the adjacent mesorectum, as well as the precaval region of the upper abdomen. Multiple prominent liver metastases    CTA chest 06/16/2021 noted no evidence of PE.  7 mm nodule seen within the right lower lobe posteriorly worrisome for metastatic disease. No region of intrathoracic lymphadenopathy is identified    Flex sigmoidoscopy 06/29/2021:  Nearly complete obstructive rectal mass at 15cms from AV  Colon, rectum, biopsy - Colonic mucosa with high-grade dysplasia; see comment. COMMENT   Additional deeper levels were also examined    MRI Rectum on 06/29/2021:  7.7 cm rectosigmoid mass spanning and invading the peritoneal reflection with superior rectal lymphadenopathy. Stage: T4a N2  MRF: Clear (tumor margin >2 mm from MRF)   Sphincter involvement: No.   Suspicious extra mesorectal lymph nodes: No    CEA 25.4 on 07/14/2021    Laparoscopic loop sigmoid colostomy and liver biopsy 07/15/2021  Liver, biopsy - Adenocarcinoma, morphologically compatible with colorectal primary  NGS testing pending    Case discussed with Dr. Inocencia Bearden [facility]  Systemic chemotherapy consists of FOLFOX + Avastin. Side effects reviewed. She agreed to proceed. Mediport placed  Cycle # 1 FOLFOX + Avastin is today 08/17/2021    Review of Systems;  CONSTITUTIONAL: No fever, chills. Fair appetite and energy level. ENMT: Eyes: No diplopia; Nose: No epistaxis. Mouth: No sore throat. RESPIRATORY: No hemoptysis, shortness of breath, cough. CARDIOVASCULAR: No chest pain, palpitations.   GASTROINTESTINAL: see HPI  GENITOURINARY: No dysuria, urinary frequency, hematuria. NEURO: No syncope, presyncope, headache. Remainder:  ROS NEGATIVE    Past Medical History:      Diagnosis Date    Alcoholic ([facility]     Anxiety     Cancer ([facility]     Chronic right shoulder pain     Convulsions/seizures ([facility] 09/16/2011    Depression     Depression 12/14/2020    GERD (gastroesophageal reflux disease)     Headache     HTN (hypertension) 06/16/2015    Mixed hyperlipidemia 02/17/2016    Osteoarthritis     Psychiatric problem     Substance abuse (HCC)     methadone     Medications:  Reviewed and reconciled. Allergies:  No Known Allergies    Physical Exam:  BP (!) 126/53   Pulse 93   Temp 98.7 °F (37.1 °C)   Resp 18   Ht 5' 3.5\" (1.613 m)   Wt 144 lb 11.2 oz (65.6 kg)   LMP 09/08/2011   SpO2 98%   BMI 25.23 kg/m²   GENERAL: Alert, oriented x 3, not in acute distress. HEENT: PERRLA; EOMI. Oropharynx clear. NECK: Supple. Without lymphadenopathy. LUNGS: Good air entry bilaterally. No wheezing, crackles or ronchi. CARDIOVASCULAR: Regular rate. No murmurs, rubs or gallops. ABDOMEN: Soft. Non-tender, non-distended. Positive bowel sounds  EXTREMITIES: Without clubbing, cyanosis, or edema. NEUROLOGIC: No focal deficits. ECOG PS 1    Impression/Plan:  63 y/o female with clinical stage: [id number] Rectosigmoid Cancer    CT abdomen/pelvis 06/16/2021:  Circumferentially infiltrative mass in the upper rectum, consistent with malignancy  Metastatic lymph nodes in the adjacent mesorectum, as well as the precaval region of the upper abdomen. Multiple prominent liver metastases    CTA chest 06/16/2021 noted no evidence of PE.  7 mm nodule seen within the right lower lobe posteriorly worrisome for metastatic disease. No region of intrathoracic lymphadenopathy is identified    Flex sigmoidoscopy 06/29/2021:  Nearly complete obstructive rectal mass at 15cms from AV  Colon, rectum, biopsy - Colonic mucosa with high-grade dysplasia; see comment.    COMMENT

## 2021-08-18 ENCOUNTER — TELEPHONE (OUTPATIENT)
Dept: HEMATOLOGY | Age: 61
End: 2021-08-18

## 2021-08-18 NOTE — TELEPHONE ENCOUNTER
Spoke to patient to schedule her new patient appointment with Palliative. Explained what our department has to offer and pt expressed understanding. Pt chose to be scheduled at [facility] location. Pt also okay'd her appointment to be same day as her port draw at another location.

## 2021-08-19 ENCOUNTER — HOSPITAL ENCOUNTER (OUTPATIENT)
Dept: INFUSION THERAPY | Age: 61
Discharge: HOME | End: 2021-08-19
Payer: COMMERCIAL

## 2021-08-19 DIAGNOSIS — C20 RECTAL CANCER (HCC): ICD-10-CM

## 2021-08-19 DIAGNOSIS — C18.9 ADENOCARCINOMA OF COLON (HCC): ICD-10-CM

## 2021-08-19 DIAGNOSIS — I87.8 POOR VENOUS ACCESS: Primary | ICD-10-CM

## 2021-08-19 PROCEDURE — 2580000003 HC RX 258: Performed by: NURSE PRACTITIONER

## 2021-08-19 PROCEDURE — 6360000002 HC RX W HCPCS: Performed by: NURSE PRACTITIONER

## 2021-08-19 PROCEDURE — 99214 OFFICE O/P EST MOD 30 MIN: CPT

## 2021-08-19 RX ORDER — HEPARIN SODIUM (PORCINE) LOCK FLUSH IV SOLN 100 UNIT/ML 100 UNIT/ML
500 SOLUTION INTRAVENOUS PRN
Status: CANCELLED | OUTPATIENT
Start: 2021-08-19

## 2021-08-19 RX ORDER — SODIUM CHLORIDE 0.9 % (FLUSH) 0.9 %
5-40 SYRINGE (ML) INJECTION PRN
Status: DISCONTINUED | OUTPATIENT
Start: 2021-08-19 | End: 2021-08-20 | Stop reason: HOSPADM

## 2021-08-19 RX ORDER — SODIUM CHLORIDE 0.9 % (FLUSH) 0.9 %
5-40 SYRINGE (ML) INJECTION PRN
Status: CANCELLED | OUTPATIENT
Start: 2021-08-19

## 2021-08-19 RX ORDER — SODIUM CHLORIDE 9 MG/ML
25 INJECTION, SOLUTION INTRAVENOUS PRN
Status: CANCELLED | OUTPATIENT
Start: 2021-08-19

## 2021-08-19 RX ORDER — HEPARIN SODIUM (PORCINE) LOCK FLUSH IV SOLN 100 UNIT/ML 100 UNIT/ML
500 SOLUTION INTRAVENOUS PRN
Status: DISCONTINUED | OUTPATIENT
Start: 2021-08-19 | End: 2021-08-20 | Stop reason: HOSPADM

## 2021-08-19 RX ORDER — OXYCODONE HYDROCHLORIDE 10 MG/1
10 TABLET ORAL EVERY 6 HOURS PRN
Qty: 120 TABLET | Refills: 0 | Status: SHIPPED
Start: 2021-08-19 | End: 2021-08-20 | Stop reason: SDUPTHER

## 2021-08-19 RX ADMIN — SODIUM CHLORIDE, PRESERVATIVE FREE 10 ML: 5 INJECTION INTRAVENOUS at 13:42

## 2021-08-19 RX ADMIN — Medication 500 UNITS: at 13:42

## 2021-08-19 NOTE — PROGRESS NOTES
Presents to clinic for CADD pump removal. Port site appears normal. Denies problems/concerns. Received 260 ml of 5-FU & reservoir 0 of 5-FU. Port flushed with 10 ml. NSS followed by 5 ml. Heparin Rinse prior to de access. DSD to area. Tolerated well. Encouraged to call clinic with questions/concerns.

## 2021-08-20 DIAGNOSIS — C18.9 ADENOCARCINOMA OF COLON (HCC): ICD-10-CM

## 2021-08-20 RX ORDER — OXYCODONE HYDROCHLORIDE 10 MG/1
10 TABLET ORAL EVERY 6 HOURS PRN
Qty: 120 TABLET | Refills: 0 | Status: SHIPPED
Start: 2021-08-20 | End: 2021-09-15 | Stop reason: SDUPTHER

## 2021-08-20 NOTE — TELEPHONE ENCOUNTER
Emily's medication was sent to the wrong pharmacy, I call and canceled it and here is the correct pharmacy.     Thank you

## 2021-08-20 NOTE — TELEPHONE ENCOUNTER
Please advise the patient to come in and give a urine sample today or Monday. Thank you. Patient's PDMP was reviewed today.

## 2021-08-25 NOTE — TELEPHONE ENCOUNTER
Spoke with patient informed that prescription was approved and sent to pharmacy. Informed patient that  she needs to come in a give a urine. Patient stated she will be in on 8/26/21.

## 2021-08-26 ENCOUNTER — NURSE ONLY (OUTPATIENT)
Dept: FAMILY MEDICINE CLINIC | Age: 61
End: 2021-08-26
Payer: COMMERCIAL

## 2021-08-26 LAB
AMPHETAMINE SCREEN, URINE: NOT DETECTED
BARBITURATE SCREEN URINE: NOT DETECTED
BENZODIAZEPINE SCREEN, URINE: NOT DETECTED
CANNABINOID SCREEN URINE: NOT DETECTED
COCAINE METABOLITE SCREEN URINE: NOT DETECTED
FENTANYL SCREEN, URINE: NOT DETECTED
Lab: ABNORMAL
METHADONE SCREEN, URINE: NOT DETECTED
OPIATE SCREEN URINE: NOT DETECTED
OXYCODONE URINE: POSITIVE
PHENCYCLIDINE SCREEN URINE: NOT DETECTED

## 2021-08-27 ENCOUNTER — HOSPITAL ENCOUNTER (OUTPATIENT)
Dept: WOUND CARE | Age: 61
Discharge: HOME | End: 2021-08-27
Payer: COMMERCIAL

## 2021-08-27 PROCEDURE — 99202 OFFICE O/P NEW SF 15 MIN: CPT

## 2021-08-27 NOTE — FLOWSHEET NOTE
Clinical Level of Care Assessment    Outpatient Ostomy Care      NAME:  Hermelinda Orr OF BIRTH:  1960  MEDICAL RECORD NUMBER:  [id number]   DATE:  8/27/2021      Patient Roderick Monday Assessment- Document in Flowsheet I&O   Points   Review of chart []   0   Assess Complete Ostomy tab in Navigator for assessment of; stoma status, peristomal skin, presence of hernia/stool consistency/diet/related medications   Simple adjustments to pouch size/pouch system, new stoma pattern, accessory addition/deletion. []   1   Assess Complete Ostomy tab in Navigator for assessment of; stoma status, peristomal skin, presence of hernia/stool consistency/diet/related medications   Moderate adjustments to pouch size/pouch system, new stoma pattern, accessory addition/deletion. Observe patient/caregiver with hands-on care. 1-2 adjustments to pouch size/system/skin care/accessory addition or deletion. [x]   2   Assess Complete Ostomy tab in Navigator for assessment of; stoma status, peristomal skin, presence of hernia/stool consistency/diet/related medications   Complex adjustments to pouch size/pouch system, new stoma pattern, accessory addition/deletion. 3 or more complex adjustments to pouch size/system/skin care/accessory addition or deletion. Observe patient/caregiver with hands-on care. Assess patient/patient abdomen for optimal pre-marked stoma site. Assess patient abdomen for type of hernia belt/accessory needed. []   3         Ambulation Status Documented in [facility] Clinical Note  Status Definition Points   Independent Independently able to ambulate. Fully able (without any assistance) to get on/off exam table/chair. [x]   0   Minimal Physical Assistance Requires physical assistance of one person to ambulate and/or position patient to be examined. Includes necessary physical assistance to position lower extremities on/off stool.    []   1   Moderate Physical Assistance Requires at least one staff member to physically assist patient in ambulating into treatment room, and/or on off chair/bed. Requires assistance to bathroom. []   2   Full Assistance Requires assistance of at least two staff members to transfer patient into treatment room and/or on/off bed/chair. \"Total Transfer\". Unable to use bathroom requires bedside commode and/or bedpan []   3       Teaching Effort Documented in [facility] Clinical Note  Effort Definition Points   No Teaching  []   0   General Initial/Simple lesson:  Assess readiness to learn, assess patient learning style to determine educational flow/special needs for learning. Teaching related to 1-3 topics  Documentation in [facility] completed. []   1   Intermediate Assess readiness to learn, assess patient learning style to determine educational flow/special needs for learning. Teaching related to 3-4 topics. Hernia belt application and care considerations  Documentation in [facility] completed. [x]   2   Complex Assess readiness to learn, assess patient learning style to determine educational flow/special needs for learning. Teaching of greater than 5 additional topics   Pre-operative ostomy education with review of written resources for patient/family/caregiver as needed. Demonstration/return demonstration of ostomy irrigation  Documentation in [facility] completed. []   3     Patient Assessment and Planning in [facility] Clinical Note   Planning Definition Points   Simple Simple pouch change procedure completed and reviewed with patient/family/caregiver   Documentation in [facility] completed. [x]   1   Intermediate Moderate level of follow-up needs:   Pouch change/discharge procedure revised and reviewed with patient/caregiver. Communications with outside resources; i.e. Telephone calls to Surgeon/ PCP, family/caregiver, home health, ECF. Documentation in [facility] completed.      []   2   Complex Complex level of instructions/changes:   Family/Caregiver learning/demonstration/return demonstration visit. Pouching/discharge procedure revised/reviewed with patient/family/caregiver. Contact with outside resources; i.e. communication with Surgeon/ PCP, home health, ECF. Contact/referral to ostomy appliance supplier for new or additional products. Review when to call [facility] or schedule follow-up visit. Referral to Emergency Department   Documentation in [facility] completed. []   3       Is this the Patient's First Visit with [facility] @ [facility]? Yes    Is this Patient Established to this [facility] within the last 3 years? Yes             Clinical Level of Care      Points  0-3  Level 1 []     Points  4-6  Level 2 [x]     Points  7-8  Level 3 []     Points  9-10  Level 4 []     Points  11-12  Level 5 []      08/27/21 1350   Colostomy LUQ Descending/sigmoid   Placement Date: 07/15/21   Pre-existing: No  Inserted by: [address] [place] [address]  Location: LUQ  Colostomy Type: Descending/sigmoid   Stomal Appliance 1 piece; Changed  (3 inch)   Flange Size (inches)   (3 inch pouch)   Stoma  Assessment Red;Moist;Protrudes; Swelling   Mucocutaneous Junction Intact   Peristomal Assessment Pink   Treatment Bag change;Stoma powder  (skin care)   Stool Appearance Loose   Stool Color Green  (from peas)   Stool Amount Small   patient concerned regarding stoma swelling yesterday and bleeding  Patient states swelling has decreased- was bending over pulling weeds- discussed sitting on the ground to avoid the strain that could of been caused by her position  Reviewed care, provided teaching material   Patient to follow up in a month to re-evaluate for smaller pouich    Electronically signed by Jeannie Everett RN on 8/27/2021 at 2:16 PM

## 2021-08-30 ENCOUNTER — HOSPITAL ENCOUNTER (EMERGENCY)
Age: 61
Discharge: LEFT BEFORE DISCHARGE | End: 2021-08-30
Attending: STUDENT IN AN ORGANIZED HEALTH CARE EDUCATION/TRAINING PROGRAM
Payer: COMMERCIAL

## 2021-08-30 ENCOUNTER — TELEPHONE (OUTPATIENT)
Dept: INFUSION THERAPY | Age: 61
End: 2021-08-30

## 2021-08-30 ENCOUNTER — HOSPITAL ENCOUNTER (OUTPATIENT)
Dept: INFUSION THERAPY | Age: 61
Discharge: HOME | End: 2021-08-30
Payer: COMMERCIAL

## 2021-08-30 VITALS
OXYGEN SATURATION: 96 % | TEMPERATURE: 96.9 F | HEART RATE: 90 BPM | DIASTOLIC BLOOD PRESSURE: 60 MMHG | WEIGHT: 144 LBS | RESPIRATION RATE: 16 BRPM | BODY MASS INDEX: 24.59 KG/M2 | SYSTOLIC BLOOD PRESSURE: 110 MMHG | HEIGHT: 64 IN

## 2021-08-30 DIAGNOSIS — C20 RECTAL CANCER (HCC): Primary | ICD-10-CM

## 2021-08-30 DIAGNOSIS — I87.8 POOR VENOUS ACCESS: ICD-10-CM

## 2021-08-30 DIAGNOSIS — D64.9 ANEMIA, UNSPECIFIED TYPE: ICD-10-CM

## 2021-08-30 DIAGNOSIS — C19 COLORECTAL CANCER (HCC): ICD-10-CM

## 2021-08-30 DIAGNOSIS — E83.42 HYPOMAGNESEMIA: ICD-10-CM

## 2021-08-30 DIAGNOSIS — E87.6 HYPOKALEMIA: Primary | ICD-10-CM

## 2021-08-30 LAB
ALBUMIN SERPL-MCNC: 3.1 G/DL (ref 3.5–5.2)
ALBUMIN SERPL-MCNC: 3.5 G/DL (ref 3.5–5.2)
ALP BLD-CCNC: 153 U/L (ref 35–104)
ALP BLD-CCNC: 165 U/L (ref 35–104)
ALT SERPL-CCNC: 13 U/L (ref 0–32)
ALT SERPL-CCNC: 14 U/L (ref 0–32)
ANION GAP SERPL CALCULATED.3IONS-SCNC: 12 MMOL/L (ref 7–16)
ANION GAP SERPL CALCULATED.3IONS-SCNC: 15 MMOL/L (ref 7–16)
AST SERPL-CCNC: 22 U/L (ref 0–31)
AST SERPL-CCNC: 24 U/L (ref 0–31)
ATYPICAL LYMPHOCYTE RELATIVE PERCENT: 2 % (ref 0–4)
BASOPHILS ABSOLUTE: 0 E9/L (ref 0–0.2)
BASOPHILS ABSOLUTE: 0 E9/L (ref 0–0.2)
BASOPHILS RELATIVE PERCENT: 0 % (ref 0–2)
BASOPHILS RELATIVE PERCENT: 0.5 % (ref 0–2)
BILIRUB SERPL-MCNC: 0.2 MG/DL (ref 0–1.2)
BILIRUB SERPL-MCNC: 0.2 MG/DL (ref 0–1.2)
BUN BLDV-MCNC: 6 MG/DL (ref 6–20)
BUN BLDV-MCNC: 6 MG/DL (ref 6–20)
CALCIUM SERPL-MCNC: 8.1 MG/DL (ref 8.6–10.2)
CALCIUM SERPL-MCNC: 8.2 MG/DL (ref 8.6–10.2)
CEA: 15 NG/ML (ref 0–5.2)
CHLORIDE BLD-SCNC: 91 MMOL/L (ref 98–107)
CHLORIDE BLD-SCNC: 91 MMOL/L (ref 98–107)
CO2: 27 MMOL/L (ref 22–29)
CO2: 28 MMOL/L (ref 22–29)
CREAT SERPL-MCNC: 0.7 MG/DL (ref 0.5–1)
CREAT SERPL-MCNC: 0.7 MG/DL (ref 0.5–1)
EKG ATRIAL RATE: 89 BPM
EKG P AXIS: 59 DEGREES
EKG P-R INTERVAL: 140 MS
EKG Q-T INTERVAL: 378 MS
EKG QRS DURATION: 88 MS
EKG QTC CALCULATION (BAZETT): 459 MS
EKG R AXIS: 15 DEGREES
EKG T AXIS: -15 DEGREES
EKG VENTRICULAR RATE: 89 BPM
EOSINOPHILS ABSOLUTE: 0 E9/L (ref 0.05–0.5)
EOSINOPHILS ABSOLUTE: 0.04 E9/L (ref 0.05–0.5)
EOSINOPHILS RELATIVE PERCENT: 0 % (ref 0–6)
EOSINOPHILS RELATIVE PERCENT: 0.9 % (ref 0–6)
GFR AFRICAN AMERICAN: >60
GFR AFRICAN AMERICAN: >60
GFR NON-AFRICAN AMERICAN: >60 ML/MIN/1.73
GFR NON-AFRICAN AMERICAN: >60 ML/MIN/1.73
GLUCOSE BLD-MCNC: 110 MG/DL (ref 74–99)
GLUCOSE BLD-MCNC: 113 MG/DL (ref 74–99)
HCT VFR BLD CALC: 27.5 % (ref 34–48)
HCT VFR BLD CALC: 29 % (ref 34–48)
HEMOGLOBIN: 9.6 G/DL (ref 11.5–15.5)
HEMOGLOBIN: 9.8 G/DL (ref 11.5–15.5)
LYMPHOCYTES ABSOLUTE: 1.41 E9/L (ref 1.5–4)
LYMPHOCYTES ABSOLUTE: 1.54 E9/L (ref 1.5–4)
LYMPHOCYTES RELATIVE PERCENT: 30 % (ref 20–42)
LYMPHOCYTES RELATIVE PERCENT: 34.8 % (ref 20–42)
MAGNESIUM: 1.5 MG/DL (ref 1.6–2.6)
MCH RBC QN AUTO: 29.9 PG (ref 26–35)
MCH RBC QN AUTO: 30.7 PG (ref 26–35)
MCHC RBC AUTO-ENTMCNC: 33.8 % (ref 32–34.5)
MCHC RBC AUTO-ENTMCNC: 34.9 % (ref 32–34.5)
MCV RBC AUTO: 87.9 FL (ref 80–99.9)
MCV RBC AUTO: 88.4 FL (ref 80–99.9)
MONOCYTES ABSOLUTE: 0.48 E9/L (ref 0.1–0.95)
MONOCYTES ABSOLUTE: 0.62 E9/L (ref 0.1–0.95)
MONOCYTES RELATIVE PERCENT: 11.3 % (ref 2–12)
MONOCYTES RELATIVE PERCENT: 14 % (ref 2–12)
MYELOCYTE PERCENT: 0.9 % (ref 0–0)
NEUTROPHILS ABSOLUTE: 2.33 E9/L (ref 1.8–7.3)
NEUTROPHILS ABSOLUTE: 2.38 E9/L (ref 1.8–7.3)
NEUTROPHILS RELATIVE PERCENT: 52.2 % (ref 43–80)
NEUTROPHILS RELATIVE PERCENT: 54 % (ref 43–80)
OVALOCYTES: ABNORMAL
PDW BLD-RTO: 12.6 FL (ref 11.5–15)
PDW BLD-RTO: 12.8 FL (ref 11.5–15)
PLATELET # BLD: 283 E9/L (ref 130–450)
PLATELET # BLD: 309 E9/L (ref 130–450)
PMV BLD AUTO: 8.8 FL (ref 7–12)
PMV BLD AUTO: 9.4 FL (ref 7–12)
POIKILOCYTES: ABNORMAL
POLYCHROMASIA: ABNORMAL
POTASSIUM SERPL-SCNC: 2.1 MMOL/L (ref 3.5–5)
POTASSIUM SERPL-SCNC: 2.2 MMOL/L (ref 3.5–5)
RBC # BLD: 3.13 E12/L (ref 3.5–5.5)
RBC # BLD: 3.28 E12/L (ref 3.5–5.5)
RBC # BLD: NORMAL 10*6/UL
SODIUM BLD-SCNC: 131 MMOL/L (ref 132–146)
SODIUM BLD-SCNC: 133 MMOL/L (ref 132–146)
TEAR DROP CELLS: ABNORMAL
TOTAL PROTEIN: 6.8 G/DL (ref 6.4–8.3)
TOTAL PROTEIN: 7.5 G/DL (ref 6.4–8.3)
WBC # BLD: 4.4 E9/L (ref 4.5–11.5)
WBC # BLD: 4.4 E9/L (ref 4.5–11.5)

## 2021-08-30 PROCEDURE — 80053 COMPREHEN METABOLIC PANEL: CPT

## 2021-08-30 PROCEDURE — 83735 ASSAY OF MAGNESIUM: CPT

## 2021-08-30 PROCEDURE — 96366 THER/PROPH/DIAG IV INF ADDON: CPT

## 2021-08-30 PROCEDURE — 6370000000 HC RX 637 (ALT 250 FOR IP): Performed by: STUDENT IN AN ORGANIZED HEALTH CARE EDUCATION/TRAINING PROGRAM

## 2021-08-30 PROCEDURE — 36591 DRAW BLOOD OFF VENOUS DEVICE: CPT

## 2021-08-30 PROCEDURE — 82378 CARCINOEMBRYONIC ANTIGEN: CPT

## 2021-08-30 PROCEDURE — 6360000002 HC RX W HCPCS: Performed by: STUDENT IN AN ORGANIZED HEALTH CARE EDUCATION/TRAINING PROGRAM

## 2021-08-30 PROCEDURE — 36415 COLL VENOUS BLD VENIPUNCTURE: CPT

## 2021-08-30 PROCEDURE — 99284 EMERGENCY DEPT VISIT MOD MDM: CPT

## 2021-08-30 PROCEDURE — 96365 THER/PROPH/DIAG IV INF INIT: CPT

## 2021-08-30 PROCEDURE — 6360000002 HC RX W HCPCS: Performed by: NURSE PRACTITIONER

## 2021-08-30 PROCEDURE — 2580000003 HC RX 258: Performed by: NURSE PRACTITIONER

## 2021-08-30 PROCEDURE — 85025 COMPLETE CBC W/AUTO DIFF WBC: CPT

## 2021-08-30 PROCEDURE — 93005 ELECTROCARDIOGRAM TRACING: CPT | Performed by: STUDENT IN AN ORGANIZED HEALTH CARE EDUCATION/TRAINING PROGRAM

## 2021-08-30 RX ORDER — HEPARIN SODIUM (PORCINE) LOCK FLUSH IV SOLN 100 UNIT/ML 100 UNIT/ML
500 SOLUTION INTRAVENOUS PRN
Status: DISCONTINUED | OUTPATIENT
Start: 2021-08-30 | End: 2021-08-31 | Stop reason: HOSPADM

## 2021-08-30 RX ORDER — PANTOPRAZOLE SODIUM 40 MG/1
40 TABLET, DELAYED RELEASE ORAL
Status: CANCELLED | OUTPATIENT
Start: 2021-08-31

## 2021-08-30 RX ORDER — SERTRALINE HYDROCHLORIDE 100 MG/1
100 TABLET, FILM COATED ORAL DAILY
Status: CANCELLED | OUTPATIENT
Start: 2021-08-30

## 2021-08-30 RX ORDER — PROCHLORPERAZINE MALEATE 10 MG
10 TABLET ORAL EVERY 6 HOURS PRN
Status: CANCELLED | OUTPATIENT
Start: 2021-08-30

## 2021-08-30 RX ORDER — POTASSIUM CHLORIDE 7.45 MG/ML
10 INJECTION INTRAVENOUS
Status: COMPLETED | OUTPATIENT
Start: 2021-08-30 | End: 2021-08-30

## 2021-08-30 RX ORDER — POTASSIUM CHLORIDE 20 MEQ/1
40 TABLET, EXTENDED RELEASE ORAL 3 TIMES DAILY
Status: CANCELLED | OUTPATIENT
Start: 2021-08-30 | End: 2021-09-01

## 2021-08-30 RX ORDER — SODIUM CHLORIDE 0.9 % (FLUSH) 0.9 %
5-40 SYRINGE (ML) INJECTION PRN
Status: CANCELLED | OUTPATIENT
Start: 2021-08-30

## 2021-08-30 RX ORDER — ONDANSETRON 2 MG/ML
4 INJECTION INTRAMUSCULAR; INTRAVENOUS EVERY 6 HOURS PRN
Status: CANCELLED | OUTPATIENT
Start: 2021-08-30

## 2021-08-30 RX ORDER — ACETAMINOPHEN 650 MG/1
650 SUPPOSITORY RECTAL EVERY 6 HOURS PRN
Status: CANCELLED | OUTPATIENT
Start: 2021-08-30

## 2021-08-30 RX ORDER — ONDANSETRON 4 MG/1
4 TABLET, ORALLY DISINTEGRATING ORAL EVERY 8 HOURS PRN
Status: CANCELLED | OUTPATIENT
Start: 2021-08-30

## 2021-08-30 RX ORDER — POLYETHYLENE GLYCOL 3350 17 G/17G
17 POWDER, FOR SOLUTION ORAL DAILY PRN
Status: CANCELLED | OUTPATIENT
Start: 2021-08-30

## 2021-08-30 RX ORDER — ACETAMINOPHEN 325 MG/1
650 TABLET ORAL EVERY 6 HOURS PRN
Status: CANCELLED | OUTPATIENT
Start: 2021-08-30

## 2021-08-30 RX ORDER — SODIUM CHLORIDE 0.9 % (FLUSH) 0.9 %
5-40 SYRINGE (ML) INJECTION EVERY 12 HOURS SCHEDULED
Status: CANCELLED | OUTPATIENT
Start: 2021-08-30

## 2021-08-30 RX ORDER — SODIUM CHLORIDE 0.9 % (FLUSH) 0.9 %
5-40 SYRINGE (ML) INJECTION PRN
Status: DISCONTINUED | OUTPATIENT
Start: 2021-08-30 | End: 2021-08-31 | Stop reason: HOSPADM

## 2021-08-30 RX ORDER — ATORVASTATIN CALCIUM 10 MG/1
10 TABLET, FILM COATED ORAL DAILY
Status: CANCELLED | OUTPATIENT
Start: 2021-08-30

## 2021-08-30 RX ORDER — MAGNESIUM SULFATE IN WATER 40 MG/ML
2000 INJECTION, SOLUTION INTRAVENOUS ONCE
Status: COMPLETED | OUTPATIENT
Start: 2021-08-30 | End: 2021-08-30

## 2021-08-30 RX ORDER — SODIUM CHLORIDE 9 MG/ML
25 INJECTION, SOLUTION INTRAVENOUS PRN
Status: CANCELLED | OUTPATIENT
Start: 2021-08-30

## 2021-08-30 RX ORDER — POTASSIUM CHLORIDE 20 MEQ/1
40 TABLET, EXTENDED RELEASE ORAL ONCE
Status: COMPLETED | OUTPATIENT
Start: 2021-08-30 | End: 2021-08-30

## 2021-08-30 RX ORDER — HEPARIN SODIUM (PORCINE) LOCK FLUSH IV SOLN 100 UNIT/ML 100 UNIT/ML
500 SOLUTION INTRAVENOUS PRN
Status: CANCELLED | OUTPATIENT
Start: 2021-08-30

## 2021-08-30 RX ORDER — OXYCODONE HYDROCHLORIDE 5 MG/1
10 TABLET ORAL EVERY 6 HOURS PRN
Status: CANCELLED | OUTPATIENT
Start: 2021-08-30

## 2021-08-30 RX ADMIN — MAGNESIUM SULFATE HEPTAHYDRATE 2000 MG: 40 INJECTION, SOLUTION INTRAVENOUS at 15:13

## 2021-08-30 RX ADMIN — POTASSIUM CHLORIDE 10 MEQ: 10 INJECTION, SOLUTION INTRAVENOUS at 15:27

## 2021-08-30 RX ADMIN — POTASSIUM CHLORIDE 10 MEQ: 10 INJECTION, SOLUTION INTRAVENOUS at 16:59

## 2021-08-30 RX ADMIN — POTASSIUM CHLORIDE 40 MEQ: 20 TABLET, EXTENDED RELEASE ORAL at 15:16

## 2021-08-30 RX ADMIN — SODIUM CHLORIDE, PRESERVATIVE FREE 10 ML: 5 INJECTION INTRAVENOUS at 10:25

## 2021-08-30 RX ADMIN — Medication 500 UNITS: at 10:25

## 2021-08-30 ASSESSMENT — PAIN DESCRIPTION - PAIN TYPE: TYPE: CHRONIC PAIN

## 2021-08-30 ASSESSMENT — PAIN - FUNCTIONAL ASSESSMENT: PAIN_FUNCTIONAL_ASSESSMENT: PREVENTS OR INTERFERES SOME ACTIVE ACTIVITIES AND ADLS

## 2021-08-30 ASSESSMENT — PAIN DESCRIPTION - ONSET: ONSET: ON-GOING

## 2021-08-30 ASSESSMENT — PAIN DESCRIPTION - FREQUENCY: FREQUENCY: CONTINUOUS

## 2021-08-30 ASSESSMENT — PAIN SCALES - GENERAL: PAINLEVEL_OUTOF10: 7

## 2021-08-30 ASSESSMENT — PAIN DESCRIPTION - LOCATION: LOCATION: RECTUM

## 2021-08-30 ASSESSMENT — PAIN DESCRIPTION - DESCRIPTORS: DESCRIPTORS: DISCOMFORT;PRESSURE

## 2021-08-30 ASSESSMENT — PAIN DESCRIPTION - PROGRESSION: CLINICAL_PROGRESSION: GRADUALLY WORSENING

## 2021-08-30 NOTE — ED NOTES
Pt left prior to Dr Tiffany Hinkle speaking with pt -  Didn't get a chance to sign AMA forms  Gait steady     Christal Enriquez RN  08/30/21 1310

## 2021-08-30 NOTE — TELEPHONE ENCOUNTER
Patient returned call and notified patient that she is to go to ER now for a low potassium level. She voiced understanding and stated that she would go.

## 2021-08-30 NOTE — ED NOTES
Pt wanted to know if she can go home until bed is ready  SEGUNDO Mcfadden replied no-  Will have to sign out AMA.   Dr Keyonna Wilson aware     Arnulfo Vences RN  08/30/21 1229

## 2021-08-30 NOTE — TELEPHONE ENCOUNTER
Nurse to nurse called to [address] [place] [address] ER to [facility], who voiced understanding.

## 2021-08-30 NOTE — PROGRESS NOTES
Patient presents to clinic for labs today. Right chest  SQ port accessed per policy using 20 G .75 inch Iglesias needle for no blood return. Site flushed easily with 10 mL NSS followed by 5 mL Heparin solution 100 units/ml rinse prior to de-access. Dry sterile dressing to area. Tolerated procedure well. Encouraged to schedule port flush every 4 weeks. Labs drawn peripherally.

## 2021-08-30 NOTE — TELEPHONE ENCOUNTER
At 11:30 called [facility], and spoke to [place]. Regarding a critical potassium level, to notify Dr. Johanny Ricketts. She voiced understanding. Due to Dr. Johanny Ricketts was in with a patient, notified Prerna Franco CNP, who stated to try to add patient to the schedule today for an potassium or return call. Voiced understanding and notified Nasario Rubinstein CNP once she returned from seeing a patient of result. She voiced understanding and new orders given to call patient and send to the ER. Voiced understanding and called patient several times, leaving a message for patient to return call [facility] to [phone]. Awaiting a return call.

## 2021-08-30 NOTE — ED PROVIDER NOTES
Department of Emergency Medicine   ED  Provider Note  Admit Date/RoomTime: 8/30/2021  1:52 PM  ED Room: JESSI/JESSI          History of Present Illness:  8/30/21, Time: 4:26 PM EDT  Chief Complaint   Patient presents with    Other     States was called by chemo doctor today and told to come in for low potassium. 2.2    Abnormal Lab       Kailey Cano is a 62 y.o. female presenting to the ED for low potassium. The patient has past medical history of colorectal cancer currently receiving chemotherapy who presents to the emergency department today for abnormal labs. The patient states that she had labs done today and was told her potassium is 2.2. Her last chemotherapy was August 17. She is due for chemotherapy tomorrow. She knows that she has chronic diarrhea for years. Her stools today and yesterday have been formed. She does have a colostomy. She denies any recent antibiotics. She is followed with GI this week for blood in her stool but has not noted any bright red blood today. Denies any abdominal pain, chest pain, shortness breath, nausea or vomiting. No recent loss of consciousness. No fevers. She follows with her oncologist Dr. Estefania Pérez and her primary care physician Dr. Karson Lenz.     Review of Systems:   Constitutional symptoms: Denies fever  Eyes: Denies eye pain  Ears, Nose, Mouth, Throat: Denies ear pain  Cardiovascular: Denies chest pain  Respiratory: Denies shortness of breath  Gastrointestinal: Denies blood per rectum  Genitourinary: Denies hematuria  Skin: Denies rashes  Neurological: Denies headache  Musculoskeletal: Denies extremity pain    --------------------------------------------- PAST HISTORY ---------------------------------------------  Past Medical History:  has a past medical history of Alcoholic ([facility] Anxiety, [facility] Chronic right shoulder pain, Convulsions/seizures ([facility] Depression, Depression, GERD (gastroesophageal reflux disease), Headache, HTN (hypertension), Mixed hyperlipidemia, Osteoarthritis, Psychiatric problem, and Substance abuse ([facility] Past Surgical History:  has a past surgical history that includes Ectopic pregnancy surgery; Breast surgery (1988); Tubal ligation (01/01/1993); Colon surgery (07/15/2021); and Catheter Insertion (N/A, 8/11/2021). Social History:  reports that she has been smoking cigarettes. She started smoking about 30 years ago. She has a 12.50 pack-year smoking history. She has never used smokeless tobacco. She reports that she does not drink alcohol and does not use drugs. Family History: family history includes Arthritis in her father and mother; Diabetes in her father and mother; High Blood Pressure in her father and mother; High Cholesterol in her father and mother; Kidney Disease in her mother; Obesity in her father and mother; Other in her father; Stroke in her mother; Substance Abuse in her son. . Unless otherwise noted, family history is non contributory    The patients home medications have been reviewed. Allergies: Patient has no known allergies. I have reviewed the past medical history, past surgical history, social history, and family history    ---------------------------------------------------PHYSICAL EXAM--------------------------------------    General: The patient is conversational and lying comfortably in bed. Head: Normocephalic and atraumatic. Eyes: Sclera non-icteric and no conjunctival injection  ENT: Nasal and oral membranes moist  Neck: Trachea midline. No JVD  Respiratory: Lungs clear to auscultation bilaterally. Patient speaking in full sentences. Cardiovascular: Regular rate. No pedal edema. Gastrointestinal:  Abdomen is soft and nondistended. Bowel sounds present. There is no tenderness. There is no guarding or rebound. The patient has a colostomy on the left abdomen that has brown stool. No blood noted. Musculoskeletal: No deformity  Skin: Skin warm and dry. No rashes.   Neurologic: No gross motor deficits. No aphasia. Psychiatric: Normal affect.    -------------------------------------------------- RESULTS -------------------------------------------------  I have personally reviewed all laboratory and imaging results for this patient. Results are listed below.      LABS: (Lab results interpreted by me)  Results for orders placed or performed during the hospital encounter of 08/30/21   CBC Auto Differential   Result Value Ref Range    WBC 4.4 (L) 4.5 - 11.5 E9/L    RBC 3.13 (L) 3.50 - 5.50 E12/L    Hemoglobin 9.6 (L) 11.5 - 15.5 g/dL    Hematocrit 27.5 (L) 34.0 - 48.0 %    MCV 87.9 80.0 - 99.9 fL    MCH 30.7 26.0 - 35.0 pg    MCHC 34.9 (H) 32.0 - 34.5 %    RDW 12.8 11.5 - 15.0 fL    Platelets 204 700 - 546 E9/L    MPV 8.8 7.0 - 12.0 fL    Neutrophils % 54.0 43.0 - 80.0 %    Lymphocytes % 30.0 20.0 - 42.0 %    Monocytes % 14.0 (H) 2.0 - 12.0 %    Eosinophils % 0.0 0.0 - 6.0 %    Basophils % 0.0 0.0 - 2.0 %    Neutrophils Absolute 2.38 1.80 - 7.30 E9/L    Lymphocytes Absolute 1.41 (L) 1.50 - 4.00 E9/L    Monocytes Absolute 0.62 0.10 - 0.95 E9/L    Eosinophils Absolute 0.00 (L) 0.05 - 0.50 E9/L    Basophils Absolute 0.00 0.00 - 0.20 E9/L    Atypical Lymphocytes Relative 2.0 0.0 - 4.0 %    RBC Morphology Normal    Comprehensive metabolic panel   Result Value Ref Range    Sodium 131 (L) 132 - 146 mmol/L    Potassium 2.1 (LL) 3.5 - 5.0 mmol/L    Chloride 91 (L) 98 - 107 mmol/L    CO2 28 22 - 29 mmol/L    Anion Gap 12 7 - 16 mmol/L    Glucose 113 (H) 74 - 99 mg/dL    BUN 6 6 - 20 mg/dL    CREATININE 0.7 0.5 - 1.0 mg/dL    GFR Non-African American >60 >=60 mL/min/1.73    GFR African American >60     Calcium 8.1 (L) 8.6 - 10.2 mg/dL    Total Protein 6.8 6.4 - 8.3 g/dL    Albumin 3.1 (L) 3.5 - 5.2 g/dL    Total Bilirubin 0.2 0.0 - 1.2 mg/dL    Alkaline Phosphatase 153 (H) 35 - 104 U/L    ALT 13 0 - 32 U/L    AST 22 0 - 31 U/L   Magnesium   Result Value Ref Range    Magnesium 1.5 (L) 1.6 - 2.6 mg/dL   EKG 12 Lead Result Value Ref Range    Ventricular Rate 89 BPM    Atrial Rate 89 BPM    P-R Interval 140 ms    QRS Duration 88 ms    Q-T Interval 378 ms    QTc Calculation (Bazett) 459 ms    P Axis 59 degrees    R Axis 15 degrees    T Axis -15 degrees   ,     RADIOLOGY:  Interpreted by Radiologist unless otherwise specified  No orders to display       ------------------------- NURSING NOTES AND VITALS REVIEWED ---------------------------   The nursing notes within the ED encounter and vital signs as below have been reviewed by myself  /60   Pulse 90   Temp 96.9 °F (36.1 °C) (Temporal)   Resp 16   Ht 5' 3.5\" (1.613 m)   Wt 144 lb (65.3 kg)   LMP 09/08/2011   SpO2 96%   BMI 25.11 kg/m²     Oxygen Saturation Interpretation: Normal    The patients available past medical records and past encounters were reviewed. ------------------------------ ED COURSE/MEDICAL DECISION MAKING----------------------  Medications   potassium chloride (KLOR-CON M) extended release tablet 40 mEq (40 mEq Oral Given 8/30/21 1516)   potassium chloride 10 mEq/100 mL IVPB (Peripheral Line) (0 mEq IntraVENous Stopped 8/30/21 1757)   magnesium sulfate 2000 mg in 50 mL IVPB premix (0 mg IntraVENous Stopped 8/30/21 1656)       Medical Decision Making: This is a 62 y.o. female presenting to the ED for hypokalemia. On initial presentation, the patient's vital signs are interpreted as normotensive, afebrile saturating on room air. Based on history and physical exam, we have a broad differential.  We are concerned for hypokalemia secondary to GI loss. The patient has chronic diarrhea but no recent antibiotics. Will obtain repeat laboratory studies and an EKG to assess for arrhythmia. The patient denies any complaints at this time. Her abdominal exam is soft and nonsurgical.    A 12-lead EKG was performed and interpreted by myself. The rate is 89 with normal sinus rhythm and normal axis.    The FL interval is 140, QRS interval is 88, and QTC interval is 459. The patient has no acute ST elevations or depressions. She has U waves present throughout. This is normal sinus rhythm with nonspecific abnormality and concern for hypokalemia. Laboratory studies show hyponatremia 131 and hypokalemia of 2.1. No acidosis or anion gap. The patient is hypoalbuminemic and has elevated alkaline phosphatase. She is found to be anemic at 9.6. She is also hypomagnesemic at 1.5. The patient will be given supplementation of intravenous and oral potassium. She will be given IV magnesium. With her significant electrolyte abnormalities we do feel she requires admission for repeat electrolytes and further evaluation. She receives her chemo treatments that [address] [place] [address] and her primary care physician is Dr. Mae Norman. She has chemotherapy schedule there tomorrow. I spoke with his admitting team who is agreeable to telemetry admission. Patient verbalies understanding and is agreeable to the plan. After the patient had been admitted nursing staff informed me that she was leaving [address] The nurse discussed the [place] process with the patient. By the time I presented to her bedside the patient had already left. I did not discuss her leaving AMA with her. The patient eloped ultimately. This patient's ED course included: a personal history and physicial examination    This patient has remained hemodynamically stable during their ED course. Consultations:  None    The cardiac monitor revealed sinus rhythm with a heart rate in the 90s as interpreted by me. The cardiac monitor was ordered secondary to the patient's electrolyte abnormality and to monitor the patient for dysrhythmia. CPT [id number]    Oxygen Saturation Interpretation: 96 % on room air. Normal    Counseling:   I have spoken with the patient and discussed todays results, in addition to providing specific details for the plan of care and counseling regarding the diagnosis and prognosis. Questions are answered at this time and they are agreeable with the plan.     --------------------------------- IMPRESSION AND DISPOSITION ---------------------------------    IMPRESSION  1. Hypokalemia    2. Hypomagnesemia    3. Anemia, unspecified type    4. Colorectal cancer ([facility]        DISPOSITION  Disposition: Transfer to telemetry at [facility] but subsequently eloped  Patient condition is fair    I, Dr. Ana Laura Leigh, am the primary provider of record    NOTE: This report was transcribed using voice recognition software.  Every effort was made to ensure accuracy; however, inadvertent computerized transcription errors may be present        Ana Laura Leigh MD  08/31/21 1451

## 2021-08-31 ENCOUNTER — OFFICE VISIT (OUTPATIENT)
Dept: ONCOLOGY | Age: 61
End: 2021-08-31
Payer: COMMERCIAL

## 2021-08-31 ENCOUNTER — HOSPITAL ENCOUNTER (OUTPATIENT)
Dept: INFUSION THERAPY | Age: 61
Discharge: HOME | End: 2021-08-31
Payer: COMMERCIAL

## 2021-08-31 VITALS — RESPIRATION RATE: 18 BRPM | SYSTOLIC BLOOD PRESSURE: 118 MMHG | HEART RATE: 80 BPM | DIASTOLIC BLOOD PRESSURE: 64 MMHG

## 2021-08-31 VITALS
HEART RATE: 79 BPM | HEIGHT: 64 IN | WEIGHT: 149.4 LBS | DIASTOLIC BLOOD PRESSURE: 63 MMHG | TEMPERATURE: 98.6 F | BODY MASS INDEX: 25.51 KG/M2 | RESPIRATION RATE: 18 BRPM | SYSTOLIC BLOOD PRESSURE: 118 MMHG | OXYGEN SATURATION: 100 %

## 2021-08-31 DIAGNOSIS — I87.8 POOR VENOUS ACCESS: ICD-10-CM

## 2021-08-31 DIAGNOSIS — C20 RECTAL CANCER (HCC): Primary | ICD-10-CM

## 2021-08-31 LAB
ANION GAP SERPL CALCULATED.3IONS-SCNC: 12 MMOL/L (ref 7–16)
BUN BLDV-MCNC: 6 MG/DL (ref 6–20)
CALCIUM SERPL-MCNC: 8.1 MG/DL (ref 8.6–10.2)
CHLORIDE BLD-SCNC: 93 MMOL/L (ref 98–107)
CO2: 27 MMOL/L (ref 22–29)
CREAT SERPL-MCNC: 0.6 MG/DL (ref 0.5–1)
GFR AFRICAN AMERICAN: >60
GFR NON-AFRICAN AMERICAN: >60 ML/MIN/1.73
GLUCOSE BLD-MCNC: 116 MG/DL (ref 74–99)
MAGNESIUM: 1.8 MG/DL (ref 1.6–2.6)
POTASSIUM SERPL-SCNC: 2.5 MMOL/L (ref 3.5–5)
SODIUM BLD-SCNC: 132 MMOL/L (ref 132–146)

## 2021-08-31 PROCEDURE — 6360000002 HC RX W HCPCS: Performed by: NURSE PRACTITIONER

## 2021-08-31 PROCEDURE — 36415 COLL VENOUS BLD VENIPUNCTURE: CPT

## 2021-08-31 PROCEDURE — 2580000003 HC RX 258: Performed by: NURSE PRACTITIONER

## 2021-08-31 PROCEDURE — 80048 BASIC METABOLIC PNL TOTAL CA: CPT

## 2021-08-31 PROCEDURE — 4004F PT TOBACCO SCREEN RCVD TLK: CPT | Performed by: INTERNAL MEDICINE

## 2021-08-31 PROCEDURE — 83735 ASSAY OF MAGNESIUM: CPT

## 2021-08-31 PROCEDURE — G8427 DOCREV CUR MEDS BY ELIG CLIN: HCPCS | Performed by: INTERNAL MEDICINE

## 2021-08-31 PROCEDURE — 3017F COLORECTAL CA SCREEN DOC REV: CPT | Performed by: INTERNAL MEDICINE

## 2021-08-31 PROCEDURE — G8419 CALC BMI OUT NRM PARAM NOF/U: HCPCS | Performed by: INTERNAL MEDICINE

## 2021-08-31 PROCEDURE — 96366 THER/PROPH/DIAG IV INF ADDON: CPT

## 2021-08-31 PROCEDURE — 96365 THER/PROPH/DIAG IV INF INIT: CPT

## 2021-08-31 PROCEDURE — 36593 DECLOT VASCULAR DEVICE: CPT

## 2021-08-31 PROCEDURE — 99214 OFFICE O/P EST MOD 30 MIN: CPT | Performed by: INTERNAL MEDICINE

## 2021-08-31 RX ORDER — SODIUM CHLORIDE 9 MG/ML
INJECTION, SOLUTION INTRAVENOUS CONTINUOUS
Status: DISCONTINUED | OUTPATIENT
Start: 2021-08-31 | End: 2021-09-01 | Stop reason: HOSPADM

## 2021-08-31 RX ORDER — POTASSIUM CHLORIDE 20 MEQ/1
40 TABLET, EXTENDED RELEASE ORAL 2 TIMES DAILY
Qty: 28 TABLET | Refills: 0 | Status: SHIPPED
Start: 2021-08-31 | End: 2021-09-21 | Stop reason: SDUPTHER

## 2021-08-31 RX ORDER — SODIUM CHLORIDE 0.9 % (FLUSH) 0.9 %
5-40 SYRINGE (ML) INJECTION PRN
Status: CANCELLED | OUTPATIENT
Start: 2021-08-31

## 2021-08-31 RX ORDER — HEPARIN SODIUM (PORCINE) LOCK FLUSH IV SOLN 100 UNIT/ML 100 UNIT/ML
500 SOLUTION INTRAVENOUS PRN
Status: CANCELLED | OUTPATIENT
Start: 2021-08-31

## 2021-08-31 RX ORDER — SODIUM CHLORIDE 9 MG/ML
25 INJECTION, SOLUTION INTRAVENOUS PRN
Status: CANCELLED | OUTPATIENT
Start: 2021-08-31

## 2021-08-31 RX ORDER — POTASSIUM CHLORIDE 29.8 MG/ML
20 INJECTION INTRAVENOUS ONCE
Status: COMPLETED | OUTPATIENT
Start: 2021-08-31 | End: 2021-08-31

## 2021-08-31 RX ORDER — HEPARIN SODIUM (PORCINE) LOCK FLUSH IV SOLN 100 UNIT/ML 100 UNIT/ML
500 SOLUTION INTRAVENOUS PRN
Status: DISCONTINUED | OUTPATIENT
Start: 2021-08-31 | End: 2021-09-01 | Stop reason: HOSPADM

## 2021-08-31 RX ORDER — SODIUM CHLORIDE 0.9 % (FLUSH) 0.9 %
5-40 SYRINGE (ML) INJECTION PRN
Status: DISCONTINUED | OUTPATIENT
Start: 2021-08-31 | End: 2021-09-01 | Stop reason: HOSPADM

## 2021-08-31 RX ADMIN — WATER 2.2 ML: 1 INJECTION INTRAMUSCULAR; INTRAVENOUS; SUBCUTANEOUS at 09:18

## 2021-08-31 RX ADMIN — Medication 10 ML: at 13:56

## 2021-08-31 RX ADMIN — ALTEPLASE 2 MG: 2.2 INJECTION, POWDER, LYOPHILIZED, FOR SOLUTION INTRAVENOUS at 09:18

## 2021-08-31 RX ADMIN — POTASSIUM CHLORIDE 20 MEQ: 29.8 INJECTION, SOLUTION INTRAVENOUS at 10:55

## 2021-08-31 RX ADMIN — SODIUM CHLORIDE: 9 INJECTION, SOLUTION INTRAVENOUS at 10:55

## 2021-08-31 RX ADMIN — SODIUM CHLORIDE, PRESERVATIVE FREE 500 UNITS: 5 INJECTION INTRAVENOUS at 13:56

## 2021-08-31 RX ADMIN — Medication 40 ML: at 09:28

## 2021-08-31 NOTE — PROGRESS NOTES
After 30 minutes of dwell time on the alteplase blood is easily aspirated from the patients port, 12 cc of blood aspirated and wasted followed by a 20 cc NS flush.

## 2021-08-31 NOTE — PROGRESS NOTES
CARDIOVASCULAR: No chest pain, palpitations. GASTROINTESTINAL: see HPI  GENITOURINARY: No dysuria, urinary frequency, hematuria. NEURO: No syncope, presyncope, headache. Remainder:  ROS NEGATIVE    Past Medical History:      Diagnosis Date    [facility]olic ([facility]     Anxiety     Cancer ([facility]     Chronic right shoulder pain     Convulsions/seizures ([facility] 09/16/2011    Depression     Depression 12/14/2020    GERD (gastroesophageal reflux disease)     Headache     HTN (hypertension) 06/16/2015    Mixed hyperlipidemia 02/17/2016    Osteoarthritis     Psychiatric problem     Substance abuse (HCC)     methadone     Medications:  Reviewed and reconciled. Allergies:  No Known Allergies    Physical Exam:  /63   Pulse 79   Temp 98.6 °F (37 °C)   Resp 18   Ht 5' 3.5\" (1.613 m)   Wt 149 lb 6.4 oz (67.8 kg)   LMP 09/08/2011   SpO2 100%   BMI 26.05 kg/m²   GENERAL: Alert, oriented x 3, tired  HEENT: PERRLA; EOMI. Oropharynx clear. NECK: Supple. Without lymphadenopathy. LUNGS: Good air entry bilaterally. No wheezing, crackles or ronchi. CARDIOVASCULAR: Regular rate. No murmurs, rubs or gallops. ABDOMEN: Soft. Non-tender, non-distended. Positive bowel sounds  EXTREMITIES: Without clubbing, cyanosis, or edema. NEUROLOGIC: No focal deficits. ECOG PS 1-2    Lab Results   Component Value Date    WBC 4.4 (L) 08/30/2021    HGB 9.6 (L) 08/30/2021    HCT 27.5 (L) 08/30/2021    MCV 87.9 08/30/2021     08/30/2021     Lab Results   Component Value Date    CEA 15.0 (H) 08/30/2021     Impression/Plan:  61 y/o female with clinical stage: [id number] Rectosigmoid Cancer    CT abdomen/pelvis 06/16/2021:  Circumferentially infiltrative mass in the upper rectum, consistent with malignancy  Metastatic lymph nodes in the adjacent mesorectum, as well as the precaval region of the upper abdomen.    Multiple prominent liver metastases    CTA chest 06/16/2021 noted no evidence of PE.  7 mm nodule seen within the right lower lobe posteriorly worrisome for metastatic disease. No region of intrathoracic lymphadenopathy is identified    Flex sigmoidoscopy 06/29/2021:  Nearly complete obstructive rectal mass at 15cms from AV  Colon, rectum, biopsy - Colonic mucosa with high-grade dysplasia; see comment. COMMENT   Additional deeper levels were also examined    MRI Rectum on 06/29/2021:  7.7 cm rectosigmoid mass spanning and invading the peritoneal reflection with superior rectal lymphadenopathy. Stage: T4a N2  MRF: Clear (tumor margin >2 mm from MRF)   Sphincter involvement: No.   Suspicious extra mesorectal lymph nodes: No    CEA 25.4 on 07/14/2021    Laparoscopic loop sigmoid colostomy and liver biopsy 07/15/2021  Liver, biopsy - Adenocarcinoma, morphologically compatible with colorectal primary  NGS testing pending    Case discussed with Dr. Anthony Kwong [facility]  Systemic chemotherapy consists of FOLFOX + Avastin. Side effects reviewed. She agreed to proceed. Mediport placed  Cycle # 1 FOLFOX + Avastin was on 08/17/2021. Was in ED 08/30/2021 for hypoK+ and HypoMg but left AMA  Cycle # 2 FOLFOX + Avastin held today 08/31/2021 due to hypoKalemia. Kcl 20 meq iv today 08/31/2021 and prescribed Kcl 40 meq po bid for 7 days.       RTC next week with prior labs and possible Cycle # 2 FOLFOX + Avastin    Carlos Crowe MD   8/31/2021

## 2021-08-31 NOTE — PROGRESS NOTES
The patients MediPort is accessed without difficulty, it flushes easily. I am unable to aspirate a blood return from the patients port after multiple attempts and changes in the patients position. Including the lying  patient down and rolling her from side to side. Alteplase 2 mgm  instilled into the port.

## 2021-09-07 ENCOUNTER — TELEPHONE (OUTPATIENT)
Dept: CASE MANAGEMENT | Age: 61
End: 2021-09-07

## 2021-09-07 ENCOUNTER — HOSPITAL ENCOUNTER (OUTPATIENT)
Dept: INFUSION THERAPY | Age: 61
Discharge: HOME | End: 2021-09-07
Payer: COMMERCIAL

## 2021-09-07 ENCOUNTER — OFFICE VISIT (OUTPATIENT)
Dept: ONCOLOGY | Age: 61
End: 2021-09-07
Payer: COMMERCIAL

## 2021-09-07 VITALS
WEIGHT: 139.1 LBS | OXYGEN SATURATION: 100 % | HEIGHT: 64 IN | DIASTOLIC BLOOD PRESSURE: 64 MMHG | SYSTOLIC BLOOD PRESSURE: 115 MMHG | TEMPERATURE: 99.1 F | RESPIRATION RATE: 18 BRPM | BODY MASS INDEX: 23.75 KG/M2 | HEART RATE: 73 BPM

## 2021-09-07 VITALS — SYSTOLIC BLOOD PRESSURE: 141 MMHG | DIASTOLIC BLOOD PRESSURE: 85 MMHG | HEART RATE: 75 BPM

## 2021-09-07 DIAGNOSIS — C20 RECTAL CANCER (HCC): Primary | ICD-10-CM

## 2021-09-07 LAB
ALBUMIN SERPL-MCNC: 3.5 G/DL (ref 3.5–5.2)
ALP BLD-CCNC: 186 U/L (ref 35–104)
ALT SERPL-CCNC: 14 U/L (ref 0–32)
ANION GAP SERPL CALCULATED.3IONS-SCNC: 14 MMOL/L (ref 7–16)
AST SERPL-CCNC: 32 U/L (ref 0–31)
BASOPHILS ABSOLUTE: 0.14 E9/L (ref 0–0.2)
BASOPHILS RELATIVE PERCENT: 0.9 % (ref 0–2)
BILIRUB SERPL-MCNC: <0.2 MG/DL (ref 0–1.2)
BUN BLDV-MCNC: 13 MG/DL (ref 6–20)
CALCIUM SERPL-MCNC: 9.3 MG/DL (ref 8.6–10.2)
CEA: 16.4 NG/ML (ref 0–5.2)
CHLORIDE BLD-SCNC: 100 MMOL/L (ref 98–107)
CO2: 23 MMOL/L (ref 22–29)
CREAT SERPL-MCNC: 0.6 MG/DL (ref 0.5–1)
EOSINOPHILS ABSOLUTE: 0 E9/L (ref 0.05–0.5)
EOSINOPHILS RELATIVE PERCENT: 0.3 % (ref 0–6)
GFR AFRICAN AMERICAN: >60
GFR NON-AFRICAN AMERICAN: >60 ML/MIN/1.73
GLUCOSE BLD-MCNC: 105 MG/DL (ref 74–99)
HCT VFR BLD CALC: 36.1 % (ref 34–48)
HEMOGLOBIN: 11.7 G/DL (ref 11.5–15.5)
LYMPHOCYTES ABSOLUTE: 2.3 E9/L (ref 1.5–4)
LYMPHOCYTES RELATIVE PERCENT: 14.8 % (ref 20–42)
MAGNESIUM: 1.8 MG/DL (ref 1.6–2.6)
MCH RBC QN AUTO: 29.2 PG (ref 26–35)
MCHC RBC AUTO-ENTMCNC: 32.4 % (ref 32–34.5)
MCV RBC AUTO: 90 FL (ref 80–99.9)
MONOCYTES ABSOLUTE: 0.92 E9/L (ref 0.1–0.95)
MONOCYTES RELATIVE PERCENT: 6.1 % (ref 2–12)
MYELOCYTE PERCENT: 0.9 % (ref 0–0)
NEUTROPHILS ABSOLUTE: 11.93 E9/L (ref 1.8–7.3)
NEUTROPHILS RELATIVE PERCENT: 77.4 % (ref 43–80)
OVALOCYTES: ABNORMAL
PDW BLD-RTO: 13.5 FL (ref 11.5–15)
PLATELET # BLD: 589 E9/L (ref 130–450)
PMV BLD AUTO: 8.9 FL (ref 7–12)
POIKILOCYTES: ABNORMAL
POLYCHROMASIA: ABNORMAL
POTASSIUM SERPL-SCNC: 4.6 MMOL/L (ref 3.5–5)
RBC # BLD: 4.01 E12/L (ref 3.5–5.5)
SODIUM BLD-SCNC: 137 MMOL/L (ref 132–146)
TOTAL PROTEIN: 7.6 G/DL (ref 6.4–8.3)
WBC # BLD: 15.3 E9/L (ref 4.5–11.5)

## 2021-09-07 PROCEDURE — 99214 OFFICE O/P EST MOD 30 MIN: CPT | Performed by: INTERNAL MEDICINE

## 2021-09-07 PROCEDURE — 96417 CHEMO IV INFUS EACH ADDL SEQ: CPT

## 2021-09-07 PROCEDURE — 3017F COLORECTAL CA SCREEN DOC REV: CPT | Performed by: INTERNAL MEDICINE

## 2021-09-07 PROCEDURE — 96413 CHEMO IV INFUSION 1 HR: CPT

## 2021-09-07 PROCEDURE — 83735 ASSAY OF MAGNESIUM: CPT

## 2021-09-07 PROCEDURE — 96411 CHEMO IV PUSH ADDL DRUG: CPT

## 2021-09-07 PROCEDURE — 6360000002 HC RX W HCPCS: Performed by: INTERNAL MEDICINE

## 2021-09-07 PROCEDURE — 96549 UNLISTED CHEMOTHERAPY PX: CPT

## 2021-09-07 PROCEDURE — 96416 CHEMO PROLONG INFUSE W/PUMP: CPT

## 2021-09-07 PROCEDURE — 4004F PT TOBACCO SCREEN RCVD TLK: CPT | Performed by: INTERNAL MEDICINE

## 2021-09-07 PROCEDURE — G8420 CALC BMI NORM PARAMETERS: HCPCS | Performed by: INTERNAL MEDICINE

## 2021-09-07 PROCEDURE — 80053 COMPREHEN METABOLIC PANEL: CPT

## 2021-09-07 PROCEDURE — 85025 COMPLETE CBC W/AUTO DIFF WBC: CPT

## 2021-09-07 PROCEDURE — 82378 CARCINOEMBRYONIC ANTIGEN: CPT

## 2021-09-07 PROCEDURE — G8427 DOCREV CUR MEDS BY ELIG CLIN: HCPCS | Performed by: INTERNAL MEDICINE

## 2021-09-07 PROCEDURE — 96375 TX/PRO/DX INJ NEW DRUG ADDON: CPT

## 2021-09-07 PROCEDURE — 96368 THER/DIAG CONCURRENT INF: CPT

## 2021-09-07 PROCEDURE — 2580000003 HC RX 258: Performed by: INTERNAL MEDICINE

## 2021-09-07 PROCEDURE — 96415 CHEMO IV INFUSION ADDL HR: CPT

## 2021-09-07 RX ORDER — PALONOSETRON HYDROCHLORIDE 0.05 MG/ML
0.25 INJECTION, SOLUTION INTRAVENOUS ONCE
Status: COMPLETED | OUTPATIENT
Start: 2021-09-07 | End: 2021-09-07

## 2021-09-07 RX ORDER — DEXTROSE MONOHYDRATE 50 MG/ML
INJECTION, SOLUTION INTRAVENOUS ONCE
Status: DISCONTINUED | OUTPATIENT
Start: 2021-09-07 | End: 2021-09-08 | Stop reason: HOSPADM

## 2021-09-07 RX ORDER — DEXTROSE MONOHYDRATE 50 MG/ML
INJECTION, SOLUTION INTRAVENOUS ONCE
Status: CANCELLED | OUTPATIENT
Start: 2021-09-07 | End: 2021-09-07

## 2021-09-07 RX ORDER — SODIUM CHLORIDE 0.9 % (FLUSH) 0.9 %
5-40 SYRINGE (ML) INJECTION PRN
Status: CANCELLED | OUTPATIENT
Start: 2021-09-07

## 2021-09-07 RX ORDER — SODIUM CHLORIDE 9 MG/ML
INJECTION, SOLUTION INTRAVENOUS ONCE
Status: CANCELLED | OUTPATIENT
Start: 2021-09-07 | End: 2021-09-07

## 2021-09-07 RX ORDER — HEPARIN SODIUM (PORCINE) LOCK FLUSH IV SOLN 100 UNIT/ML 100 UNIT/ML
500 SOLUTION INTRAVENOUS PRN
Status: CANCELLED | OUTPATIENT
Start: 2021-09-07

## 2021-09-07 RX ORDER — MEPERIDINE HYDROCHLORIDE 25 MG/ML
12.5 INJECTION INTRAMUSCULAR; INTRAVENOUS; SUBCUTANEOUS ONCE
Status: CANCELLED | OUTPATIENT
Start: 2021-09-07 | End: 2021-09-07

## 2021-09-07 RX ORDER — SODIUM CHLORIDE 9 MG/ML
25 INJECTION, SOLUTION INTRAVENOUS PRN
Status: CANCELLED | OUTPATIENT
Start: 2021-09-07

## 2021-09-07 RX ORDER — DEXAMETHASONE SODIUM PHOSPHATE 10 MG/ML
10 INJECTION INTRAMUSCULAR; INTRAVENOUS ONCE
Status: COMPLETED | OUTPATIENT
Start: 2021-09-07 | End: 2021-09-07

## 2021-09-07 RX ORDER — SODIUM CHLORIDE 9 MG/ML
INJECTION, SOLUTION INTRAVENOUS ONCE
Status: COMPLETED | OUTPATIENT
Start: 2021-09-07 | End: 2021-09-07

## 2021-09-07 RX ORDER — METHYLPREDNISOLONE SODIUM SUCCINATE 125 MG/2ML
125 INJECTION, POWDER, LYOPHILIZED, FOR SOLUTION INTRAMUSCULAR; INTRAVENOUS ONCE
Status: CANCELLED | OUTPATIENT
Start: 2021-09-07 | End: 2021-09-07

## 2021-09-07 RX ORDER — FLUOROURACIL 50 MG/ML
400 INJECTION, SOLUTION INTRAVENOUS ONCE
Status: COMPLETED | OUTPATIENT
Start: 2021-09-07 | End: 2021-09-07

## 2021-09-07 RX ORDER — EPINEPHRINE 1 MG/ML
0.3 INJECTION, SOLUTION, CONCENTRATE INTRAVENOUS PRN
Status: CANCELLED | OUTPATIENT
Start: 2021-09-07

## 2021-09-07 RX ORDER — DIPHENHYDRAMINE HYDROCHLORIDE 50 MG/ML
50 INJECTION INTRAMUSCULAR; INTRAVENOUS ONCE
Status: CANCELLED | OUTPATIENT
Start: 2021-09-07 | End: 2021-09-07

## 2021-09-07 RX ORDER — SODIUM CHLORIDE 9 MG/ML
INJECTION, SOLUTION INTRAVENOUS CONTINUOUS
Status: CANCELLED | OUTPATIENT
Start: 2021-09-07

## 2021-09-07 RX ORDER — DEXAMETHASONE SODIUM PHOSPHATE 10 MG/ML
10 INJECTION, SOLUTION INTRAMUSCULAR; INTRAVENOUS ONCE
Status: CANCELLED | OUTPATIENT
Start: 2021-09-07

## 2021-09-07 RX ORDER — FLUOROURACIL 50 MG/ML
400 INJECTION, SOLUTION INTRAVENOUS ONCE
Status: CANCELLED | OUTPATIENT
Start: 2021-09-07

## 2021-09-07 RX ORDER — PALONOSETRON HYDROCHLORIDE 0.05 MG/ML
0.25 INJECTION, SOLUTION INTRAVENOUS ONCE
Status: CANCELLED | OUTPATIENT
Start: 2021-09-07

## 2021-09-07 RX ADMIN — DEXAMETHASONE SODIUM PHOSPHATE 10 MG: 10 INJECTION INTRAMUSCULAR; INTRAVENOUS at 10:12

## 2021-09-07 RX ADMIN — DEXTROSE MONOHYDRATE: 50 INJECTION, SOLUTION INTRAVENOUS at 11:23

## 2021-09-07 RX ADMIN — OXALIPLATIN 145 MG: 5 INJECTION, SOLUTION INTRAVENOUS at 11:25

## 2021-09-07 RX ADMIN — FLUOROURACIL 4150 MG: 50 INJECTION, SOLUTION INTRAVENOUS at 13:48

## 2021-09-07 RX ADMIN — PALONOSETRON 0.25 MG: 0.25 INJECTION, SOLUTION INTRAVENOUS at 10:12

## 2021-09-07 RX ADMIN — LEUCOVORIN CALCIUM 700 MG: 10 INJECTION INTRAMUSCULAR; INTRAVENOUS at 11:25

## 2021-09-07 RX ADMIN — FLUOROURACIL 700 MG: 50 INJECTION, SOLUTION INTRAVENOUS at 13:48

## 2021-09-07 RX ADMIN — SODIUM CHLORIDE: 9 INJECTION, SOLUTION INTRAVENOUS at 10:13

## 2021-09-07 RX ADMIN — SODIUM CHLORIDE 300 MG: 900 INJECTION, SOLUTION INTRAVENOUS at 10:36

## 2021-09-07 NOTE — TELEPHONE ENCOUNTER
Spoke with [facility] anatomical pathology department. States that the NGS testing was sent out on 8/10/2021 per Dr. Moises Fernandez order but she is not able to view the results if scanned into the patient's chart. Spoke to Leandro Wheeler at Dr. Moises Fernandez office for testing results. States that results are completed and will fax to our office. Provided [facility] fax number. Will scan to patient chart once received and provide to Dr. Db Howe to review. Flori Horne, KARLAW, RN, OCN  Oncology Nurse Navigator

## 2021-09-07 NOTE — PROGRESS NOTES
Department of [place] [address]   Attending Clinic Note    Reason for Visit: Follow-up on a patient with Rectal cancer    PCP:  Darrin Lawson MD    History of Present Illness:  61 y/o female who was complaining of diarrhea blood in stool and painful defecation    CT abdomen/pelvis 06/16/2021:  Circumferentially infiltrative mass in the upper rectum, consistent with malignancy  Metastatic lymph nodes in the adjacent mesorectum, as well as the precaval region of the upper abdomen. Multiple prominent liver metastases    CTA chest 06/16/2021 noted no evidence of PE.  7 mm nodule seen within the right lower lobe posteriorly worrisome for metastatic disease. No region of intrathoracic lymphadenopathy is identified    Flex sigmoidoscopy 06/29/2021:  Nearly complete obstructive rectal mass at 15cms from AV  Colon, rectum, biopsy - Colonic mucosa with high-grade dysplasia; see comment. COMMENT   Additional deeper levels were also examined    MRI Rectum on 06/29/2021:  7.7 cm rectosigmoid mass spanning and invading the peritoneal reflection with superior rectal lymphadenopathy. Stage: T4a N2  MRF: Clear (tumor margin >2 mm from MRF)   Sphincter involvement: No.   Suspicious extra mesorectal lymph nodes: No    CEA 25.4 on 07/14/2021    Laparoscopic loop sigmoid colostomy and liver biopsy 07/15/2021  Liver, biopsy - Adenocarcinoma, morphologically compatible with colorectal primary  NGS testing pending    Case discussed with Dr. Webb [facility]  Systemic chemotherapy consists of FOLFOX + Avastin. Side effects reviewed. She agreed to proceed. Mediport placed  Cycle # 1 FOLFOX + Avastin was on 08/17/2021  Was in ED 08/30/2021 for hypoK+ and HypoMg but left AMA  Today 09/07/2021. No fever, chills. No N/V. Energy level improved. Review of Systems;  CONSTITUTIONAL: No fever, chills. Fair appetite. Energy level improved. ENMT: Eyes: No diplopia; Nose: No epistaxis. Mouth: No sore throat.   RESPIRATORY: No hemoptysis, shortness of breath, cough. CARDIOVASCULAR: No chest pain, palpitations. GASTROINTESTINAL: No N.V, abdominal pain  GENITOURINARY: No dysuria, urinary frequency, hematuria. NEURO: No syncope, presyncope, headache. Remainder:  ROS NEGATIVE    Past Medical History:      Diagnosis Date    Al[facility]olic ([facility]     Anxiety     [facility] ([facility]     Chronic right shoulder pain     [facility] 09/16/2011    Depression     Depression 12/14/2020    GERD (gastroesophageal reflux disease)     Headache     HTN (hypertension) 06/16/2015    Mixed hyperlipidemia 02/17/2016    Osteoarthritis     Psychiatric problem     Substance abuse (HCC)     methadone     Medications:  Reviewed and reconciled. Allergies:  No Known Allergies    Physical Exam:  /64   Pulse 73   Temp 99.1 °F (37.3 °C)   Resp 18   Ht 5' 3.5\" (1.613 m)   Wt 139 lb 1.6 oz (63.1 kg)   LMP 09/08/2011   SpO2 100%   BMI 24.25 kg/m²   GENERAL: Alert, oriented x 3, not in distress  HEENT: PERRLA; EOMI. Oropharynx clear. NECK: Supple. Without lymphadenopathy. LUNGS: Good air entry bilaterally. No wheezing, crackles or ronchi. CARDIOVASCULAR: Regular rate. No murmurs, rubs or gallops. ABDOMEN: Soft. Non-tender, non-distended. Positive bowel sounds  EXTREMITIES: Without clubbing, cyanosis, or edema. NEUROLOGIC: No focal deficits. ECOG PS 1    Lab Results   Component Value Date    WBC 15.3 (H) 09/07/2021    HGB 11.7 09/07/2021    HCT 36.1 09/07/2021    MCV 90.0 09/07/2021     (H) 09/07/2021     Impression/Plan:  63 y/o female with clinical stage: [id number] Rectosigmoid Cancer  CT abdomen/pelvis 06/16/2021:  Circumferentially infiltrative mass in the upper rectum, consistent with malignancy  Metastatic lymph nodes in the adjacent mesorectum, as well as the precaval region of the upper abdomen.    Multiple prominent liver metastases    CTA chest 06/16/2021 noted no evidence of PE.  7 mm nodule seen within the right lower lobe posteriorly worrisome for metastatic disease. No region of intrathoracic lymphadenopathy is identified    Flex sigmoidoscopy 06/29/2021:  Nearly complete obstructive rectal mass at 15cms from AV  Colon, rectum, biopsy - Colonic mucosa with high-grade dysplasia; see comment. COMMENT   Additional deeper levels were also examined    MRI Rectum on 06/29/2021:  7.7 cm rectosigmoid mass spanning and invading the peritoneal reflection with superior rectal lymphadenopathy. Stage: T4a N2  MRF: Clear (tumor margin >2 mm from MRF)   Sphincter involvement: No.   Suspicious extra mesorectal lymph nodes: No    CEA 25.4 on 07/14/2021    Laparoscopic loop sigmoid colostomy and liver biopsy 07/15/2021  Liver, biopsy - Adenocarcinoma, morphologically compatible with colorectal primary  NGS testing pending    Case discussed with Dr. Anthony Kwong [facility]  Systemic chemotherapy consists of FOLFOX + Avastin. Side effects reviewed. She agreed to proceed. Mediport placed  Cycle # 1 FOLFOX + Avastin was on 08/17/2021. Was in ED 08/30/2021 for hypoK+ and HypoMg but left AMA  Cycle # 2 FOLFOX + Avastin is today 09/07/2021. Labs reviewed ok to proceed.      RTC 2 weeks for Cycle # 3 FOLFOX + Avastin    Carlos Crowe MD   9/7/2021

## 2021-09-09 ENCOUNTER — HOSPITAL ENCOUNTER (OUTPATIENT)
Dept: INFUSION THERAPY | Age: 61
Discharge: HOME | End: 2021-09-09
Payer: COMMERCIAL

## 2021-09-09 DIAGNOSIS — C20 RECTAL CANCER (HCC): ICD-10-CM

## 2021-09-09 DIAGNOSIS — I87.8 POOR VENOUS ACCESS: Primary | ICD-10-CM

## 2021-09-09 PROCEDURE — 2580000003 HC RX 258: Performed by: NURSE PRACTITIONER

## 2021-09-09 PROCEDURE — 99214 OFFICE O/P EST MOD 30 MIN: CPT

## 2021-09-09 PROCEDURE — 6360000002 HC RX W HCPCS: Performed by: NURSE PRACTITIONER

## 2021-09-09 RX ORDER — HEPARIN SODIUM (PORCINE) LOCK FLUSH IV SOLN 100 UNIT/ML 100 UNIT/ML
500 SOLUTION INTRAVENOUS PRN
Status: CANCELLED | OUTPATIENT
Start: 2021-09-09

## 2021-09-09 RX ORDER — SODIUM CHLORIDE 0.9 % (FLUSH) 0.9 %
5-40 SYRINGE (ML) INJECTION PRN
Status: CANCELLED | OUTPATIENT
Start: 2021-09-09

## 2021-09-09 RX ORDER — SODIUM CHLORIDE 9 MG/ML
25 INJECTION, SOLUTION INTRAVENOUS PRN
Status: CANCELLED | OUTPATIENT
Start: 2021-09-09

## 2021-09-09 RX ORDER — SODIUM CHLORIDE 0.9 % (FLUSH) 0.9 %
5-40 SYRINGE (ML) INJECTION PRN
Status: DISCONTINUED | OUTPATIENT
Start: 2021-09-09 | End: 2021-09-10 | Stop reason: HOSPADM

## 2021-09-09 RX ORDER — HEPARIN SODIUM (PORCINE) LOCK FLUSH IV SOLN 100 UNIT/ML 100 UNIT/ML
500 SOLUTION INTRAVENOUS PRN
Status: DISCONTINUED | OUTPATIENT
Start: 2021-09-09 | End: 2021-09-10 | Stop reason: HOSPADM

## 2021-09-09 RX ADMIN — Medication 500 UNITS: at 14:24

## 2021-09-09 RX ADMIN — SODIUM CHLORIDE, PRESERVATIVE FREE 10 ML: 5 INJECTION INTRAVENOUS at 14:24

## 2021-09-09 NOTE — PROGRESS NOTES
Presents to clinic for CADD pump removal. Port site appears normal. Denies problems/concerns. Received 260 ml of 5-FU & reservoir 0ml of 5-FU. Port flushed with 10 ml. NSS followed by 5 ml. Heparin Rinse prior to de access. DSD to area. Tolerated well. Encouraged to call clinic with questions/concerns.

## 2021-09-10 ENCOUNTER — TELEPHONE (OUTPATIENT)
Dept: CASE MANAGEMENT | Age: 61
End: 2021-09-10

## 2021-09-10 ENCOUNTER — TELEPHONE (OUTPATIENT)
Dept: INFUSION THERAPY | Age: 61
End: 2021-09-10

## 2021-09-10 NOTE — TELEPHONE ENCOUNTER
Donis Schilder Maybee  9/10/2021  Wt Readings from Last 10 Encounters:   09/07/21 139 lb 1.6 oz (63.1 kg)   08/31/21 149 lb 6.4 oz (67.8 kg)   08/30/21 144 lb (65.3 kg)   08/17/21 144 lb 11.2 oz (65.6 kg)   08/11/21 143 lb (64.9 kg)   08/06/21 144 lb 14.4 oz (65.7 kg)   06/23/21 159 lb (72.1 kg)   06/16/21 157 lb (71.2 kg)   06/09/21 157 lb (71.2 kg)   04/13/21 153 lb (69.4 kg)     Ht Readings from Last 1 Encounters:   09/07/21 5' 3.5\" (1.613 m)     Called pt for follow up. Pt w/ ongoing chemotherapy for rectal cancer and ongoing wt loss. Pt reported that she did receive Ensure Enlive via [facility] and drinks 1-2 daily in addition to small meals. She is also taking a multi-vitamin and iron supplement. She noted watery stools and need to empty her colostomy bag frequently the day of chemotherapy and the following 2 days. She is drinking about 68 oz of water daily in addition to V8 Juice, apple juice, 7-up and ginger ale. She does not care for Gatorade but did recently purchase electrolyte powder from [facility] pharmacy and plans to start using this. Encouraged her to begin using this to aid with hydration. She noted metallic taste, encouraged use of plastic utensils and avoidance of canned foods. She commented that she is feeling very fatigued and expressed frustration with not being able to perform the tasks she could previously. Discussed that fatigue can be r/t treatment but also recent significant wt loss. Encouraged at least 4 small meals and 2 Ensure Enlive daily with high calorie/protein foods at meals. She stated that she does have high calorie/protein handouts at home. She declined increasing ONS order, commenting that 2 is all she can drink in a day. Discussed homemade shakes/smoothies with pt as she reported that fruit has been appealing to her recently. Pt was receptive of information provided, all questions were answered, and will continue to follow.      Weight change: 4% wt loss in 1 month   Appetite: fair  N/V/D/C: intermittent diarrhea following chemotherapy   Calculated Needs if applicable: Using ABW= 328.8 lb (55.9 kg)  6167-6461 kcal (30-32 kcal/kg), 75-85 gm protein (1.3-1.5 gm/kg), 1800 ml FW (32 ml/kg)    Recommendations: Pt is to consume at least 4 small meals and 2 Ensure Enlive daily. Ensure to provide: 700 kcal and 40 gm protein daily. She is also to supplement fluid intake with an electrolyte beverage. [employer] GUIDELINES FOR CLINICAL CHARACTERISTICS OF MALNUTRITION IN CHRONIC ILLNESS     Moderate Malnutrition  Severe Malnutrition    Energy intake  <75% energy intake compared to estimated needs for >1month <75% energy intake compared to estimated needs for >1month   Weight changes  5% x 1 month  7.5% x 3 months   10% x 6 months   20% x 1 year  >5% x 1 month  >7.5% x 3 months   >10% x 6 months   >20% x 1 year    Physical findings  Mild   Decrease subcutaneous fat    Decrease muscle mass     Increase fluid/edema   Severe  Decrease subcutaneous fat    Decrease muscle mass     Increase fluid/edema    Pt w/ worsening malnutrition AEB ongoing wt loss and decreased intake for >3 months.     Sarah Farrell, RD, LD,RD,LD

## 2021-09-10 NOTE — TELEPHONE ENCOUNTER
Order received to do [facility] liquid biopsy from Dr. Olga Burgos. Will contact patient with appointment to come to clinic for the lab draw. Merlinda Pencil  RN, ADN, BSE, OCN  Patient Nurse Navigator

## 2021-09-14 ENCOUNTER — HOSPITAL ENCOUNTER (OUTPATIENT)
Dept: INFUSION THERAPY | Age: 61
Discharge: HOME | End: 2021-09-14
Payer: COMMERCIAL

## 2021-09-14 ENCOUNTER — TELEPHONE (OUTPATIENT)
Dept: INFUSION THERAPY | Age: 61
End: 2021-09-14

## 2021-09-14 PROCEDURE — 36415 COLL VENOUS BLD VENIPUNCTURE: CPT

## 2021-09-14 NOTE — PROGRESS NOTES
Labs drawn peripherally and dry dressing applied.  Specimen sent to [facility]

## 2021-09-15 ENCOUNTER — OFFICE VISIT (OUTPATIENT)
Dept: FAMILY MEDICINE CLINIC | Age: 61
End: 2021-09-15
Payer: COMMERCIAL

## 2021-09-15 VITALS
TEMPERATURE: 96.6 F | HEART RATE: 92 BPM | WEIGHT: 134 LBS | OXYGEN SATURATION: 98 % | DIASTOLIC BLOOD PRESSURE: 70 MMHG | HEIGHT: 64 IN | SYSTOLIC BLOOD PRESSURE: 125 MMHG | BODY MASS INDEX: 22.88 KG/M2

## 2021-09-15 DIAGNOSIS — C18.9 ADENOCARCINOMA OF COLON (HCC): ICD-10-CM

## 2021-09-15 PROCEDURE — 4004F PT TOBACCO SCREEN RCVD TLK: CPT | Performed by: FAMILY MEDICINE

## 2021-09-15 PROCEDURE — G8420 CALC BMI NORM PARAMETERS: HCPCS | Performed by: FAMILY MEDICINE

## 2021-09-15 PROCEDURE — G8427 DOCREV CUR MEDS BY ELIG CLIN: HCPCS | Performed by: FAMILY MEDICINE

## 2021-09-15 PROCEDURE — 99212 OFFICE O/P EST SF 10 MIN: CPT | Performed by: FAMILY MEDICINE

## 2021-09-15 PROCEDURE — 3017F COLORECTAL CA SCREEN DOC REV: CPT | Performed by: FAMILY MEDICINE

## 2021-09-15 PROCEDURE — 99213 OFFICE O/P EST LOW 20 MIN: CPT | Performed by: FAMILY MEDICINE

## 2021-09-15 RX ORDER — OXYCODONE HYDROCHLORIDE 10 MG/1
10 TABLET ORAL EVERY 6 HOURS PRN
Qty: 120 TABLET | Refills: 0 | Status: SHIPPED
Start: 2021-09-15 | End: 2021-10-19 | Stop reason: SDUPTHER

## 2021-09-15 NOTE — PROGRESS NOTES
Chief Complaint   Patient presents with    3 Month Follow-Up     Undergoing chemotherapy with Dr. Dorothy Camp. It does cause her a lot of fatigue. She has lost 20 pounds since the original diagnosis. Successfully manages the colostomy bag, but still has some hopes of getting it reversed at some time in the future. Uses the oxycodone as prescribed. Still goes to meetings for previous chemical dependence issues. Speaks openly to her companions. She is pleasant and cooperative. /70 (Site: Left Upper Arm, Position: Sitting, Cuff Size: Medium Adult)   Pulse 92   Temp 96.6 °F (35.9 °C) (Temporal)   Ht 5' 3.5\" (1.613 m)   Wt 134 lb (60.8 kg)   LMP 09/08/2011   SpO2 98%   BMI 23.36 kg/m²     General appearance good   Perrl     Fundi benign   TM's intact   Canals clear    Nose and throat free of inflammation and exudate     Neck supple    No carotid bruits    No palpable thyroid abnormalities    Heart: regular rhythm, no murmur or gallop    Lungs clear anterior and posterior    Abdomen supple     No masses, tenderness or organomegaly    Normal bowel sounds    Normal musculature     Full range of motion    Neurological motor and sensory intact  She has some discomfort in the anorectal area, but declined examination today.       Lab Results   Component Value Date    WBC 15.3 (H) 09/07/2021    HGB 11.7 09/07/2021    HCT 36.1 09/07/2021     (H) 09/07/2021    CHOL 216 (H) 12/14/2020    TRIG 187 (H) 12/14/2020    HDL 62 12/14/2020    ALT 14 09/07/2021    AST 32 (H) 09/07/2021     09/07/2021    K 4.6 09/07/2021     09/07/2021    CREATININE 0.6 09/07/2021    BUN 13 09/07/2021    CO2 23 09/07/2021    TSH 0.919 06/09/2021    INR 1.1 08/11/2021           Patient Active Problem List   Diagnosis    Convulsions/seizures ([address]    Alcohol withdrawal ([address]    Alcoholism ([address]    Seizures ([address]    Seizure ([address]    Hypokalemia    Common bile duct dilatation    HTN (hypertension)    Smoking    Mixed hyperlipidemia    Chronic GERD    Antral gastritis    Diarrhea due to drug    Diarrhea    Right shoulder pain    Depression    Rectal cancer (HCC)    Poor venous access       Current Outpatient Medications   Medication Sig Dispense Refill    oxyCODONE HCl (OXY-IR) 10 MG immediate release tablet Take 1 tablet by mouth every 6 hours as needed for Pain for up to 30 days. Intended supply: 30 days 120 tablet 0    prochlorperazine (COMPAZINE) 10 MG tablet Take 1 tablet by mouth every 6 hours as needed (nausea/vomiting) 40 tablet 3    ibuprofen (ADVIL;MOTRIN) 800 MG tablet Take 1 tablet by mouth every 6 hours as needed for Pain 20 tablet 0    acetaminophen (TYLENOL) 325 MG tablet Take 650 mg by mouth every 6 hours as needed for Pain      ibuprofen (ADVIL;MOTRIN) 200 MG tablet Take 200 mg by mouth every 6 hours as needed for Pain      hydroCHLOROthiazide (MICROZIDE) 12.5 MG capsule Take 12.5 mg by mouth daily      sertraline (ZOLOFT) 100 MG tablet TAKE 1 TABLET BY MOUTH ONCE EVERY DAY 30 tablet 3    omeprazole (PRILOSEC) 20 MG delayed release capsule Take 1 capsule by mouth daily (Patient taking differently: Take 20 mg by mouth as needed ) 30 capsule 3    atorvastatin (LIPITOR) 10 MG tablet Take 1 tablet by mouth daily 30 tablet 12    potassium chloride (KLOR-CON M) 20 MEQ extended release tablet Take 2 tablets by mouth 2 times daily for 7 days 28 tablet 0     No current facility-administered medications for this visit. Diana Vargas was seen today for 3 month follow-up. Diagnoses and all orders for this visit:    Adenocarcinoma of colon ([facility]  -     oxyCODONE HCl (OXY-IR) 10 MG immediate release tablet; Take 1 tablet by mouth every 6 hours as needed for Pain for up to 30 days.  Intended supply: 30 days

## 2021-09-15 NOTE — PROGRESS NOTES
Controlled Substance Monitoring:    Acute and Chronic Pain Monitoring:   RX Monitoring 9/15/2021   Acute Pain Prescriptions -   Periodic Controlled Substance Monitoring Possible medication side effects, risk of tolerance/dependence & alternative treatments discussed. ;No signs of potential drug abuse or diversion identified. ;Assessed functional status. ;Obtaining appropriate analgesic effect of treatment.    Chronic Pain > 50 MEDD -

## 2021-09-20 ENCOUNTER — HOSPITAL ENCOUNTER (OUTPATIENT)
Dept: INFUSION THERAPY | Age: 61
Discharge: HOME | End: 2021-09-20
Payer: COMMERCIAL

## 2021-09-20 DIAGNOSIS — C20 RECTAL CANCER (HCC): Primary | ICD-10-CM

## 2021-09-20 DIAGNOSIS — I87.8 POOR VENOUS ACCESS: ICD-10-CM

## 2021-09-20 LAB
ALBUMIN SERPL-MCNC: 3.9 G/DL (ref 3.5–5.2)
ALP BLD-CCNC: 163 U/L (ref 35–104)
ALT SERPL-CCNC: 28 U/L (ref 0–32)
ANION GAP SERPL CALCULATED.3IONS-SCNC: 12 MMOL/L (ref 7–16)
AST SERPL-CCNC: 28 U/L (ref 0–31)
BASOPHILS ABSOLUTE: 0.04 E9/L (ref 0–0.2)
BASOPHILS RELATIVE PERCENT: 0.6 % (ref 0–2)
BILIRUB SERPL-MCNC: 0.2 MG/DL (ref 0–1.2)
BUN BLDV-MCNC: 8 MG/DL (ref 6–20)
CALCIUM SERPL-MCNC: 9.3 MG/DL (ref 8.6–10.2)
CEA: 13.8 NG/ML (ref 0–5.2)
CHLORIDE BLD-SCNC: 101 MMOL/L (ref 98–107)
CO2: 27 MMOL/L (ref 22–29)
CREAT SERPL-MCNC: 0.5 MG/DL (ref 0.5–1)
EOSINOPHILS ABSOLUTE: 0.13 E9/L (ref 0.05–0.5)
EOSINOPHILS RELATIVE PERCENT: 2 % (ref 0–6)
GFR AFRICAN AMERICAN: >60
GFR NON-AFRICAN AMERICAN: >60 ML/MIN/1.73
GLUCOSE BLD-MCNC: 106 MG/DL (ref 74–99)
HCT VFR BLD CALC: 33.9 % (ref 34–48)
HEMOGLOBIN: 11.2 G/DL (ref 11.5–15.5)
IMMATURE GRANULOCYTES #: 0.02 E9/L
IMMATURE GRANULOCYTES %: 0.3 % (ref 0–5)
LYMPHOCYTES ABSOLUTE: 2.86 E9/L (ref 1.5–4)
LYMPHOCYTES RELATIVE PERCENT: 42.9 % (ref 20–42)
MAGNESIUM: 1.7 MG/DL (ref 1.6–2.6)
MCH RBC QN AUTO: 28.9 PG (ref 26–35)
MCHC RBC AUTO-ENTMCNC: 33 % (ref 32–34.5)
MCV RBC AUTO: 87.6 FL (ref 80–99.9)
MONOCYTES ABSOLUTE: 0.7 E9/L (ref 0.1–0.95)
MONOCYTES RELATIVE PERCENT: 10.5 % (ref 2–12)
NEUTROPHILS ABSOLUTE: 2.91 E9/L (ref 1.8–7.3)
NEUTROPHILS RELATIVE PERCENT: 43.7 % (ref 43–80)
PDW BLD-RTO: 14 FL (ref 11.5–15)
PLATELET # BLD: 343 E9/L (ref 130–450)
PMV BLD AUTO: 9.1 FL (ref 7–12)
POTASSIUM SERPL-SCNC: 3.2 MMOL/L (ref 3.5–5)
RBC # BLD: 3.87 E12/L (ref 3.5–5.5)
SODIUM BLD-SCNC: 140 MMOL/L (ref 132–146)
TOTAL PROTEIN: 7.6 G/DL (ref 6.4–8.3)
WBC # BLD: 6.7 E9/L (ref 4.5–11.5)

## 2021-09-20 PROCEDURE — 82378 CARCINOEMBRYONIC ANTIGEN: CPT

## 2021-09-20 PROCEDURE — 83735 ASSAY OF MAGNESIUM: CPT

## 2021-09-20 PROCEDURE — 36591 DRAW BLOOD OFF VENOUS DEVICE: CPT

## 2021-09-20 PROCEDURE — 80053 COMPREHEN METABOLIC PANEL: CPT

## 2021-09-20 PROCEDURE — 85025 COMPLETE CBC W/AUTO DIFF WBC: CPT

## 2021-09-20 PROCEDURE — 2580000003 HC RX 258: Performed by: NURSE PRACTITIONER

## 2021-09-20 PROCEDURE — 6360000002 HC RX W HCPCS: Performed by: NURSE PRACTITIONER

## 2021-09-20 RX ORDER — SODIUM CHLORIDE 9 MG/ML
25 INJECTION, SOLUTION INTRAVENOUS PRN
Status: CANCELLED | OUTPATIENT
Start: 2021-09-20

## 2021-09-20 RX ORDER — HEPARIN SODIUM (PORCINE) LOCK FLUSH IV SOLN 100 UNIT/ML 100 UNIT/ML
500 SOLUTION INTRAVENOUS PRN
Status: DISCONTINUED | OUTPATIENT
Start: 2021-09-20 | End: 2021-09-21 | Stop reason: HOSPADM

## 2021-09-20 RX ORDER — HEPARIN SODIUM (PORCINE) LOCK FLUSH IV SOLN 100 UNIT/ML 100 UNIT/ML
500 SOLUTION INTRAVENOUS PRN
Status: CANCELLED | OUTPATIENT
Start: 2021-09-20

## 2021-09-20 RX ORDER — SODIUM CHLORIDE 0.9 % (FLUSH) 0.9 %
5-40 SYRINGE (ML) INJECTION PRN
Status: CANCELLED | OUTPATIENT
Start: 2021-09-20

## 2021-09-20 RX ORDER — SODIUM CHLORIDE 0.9 % (FLUSH) 0.9 %
5-40 SYRINGE (ML) INJECTION PRN
Status: DISCONTINUED | OUTPATIENT
Start: 2021-09-20 | End: 2021-09-21 | Stop reason: HOSPADM

## 2021-09-20 RX ADMIN — Medication 500 UNITS: at 09:07

## 2021-09-20 RX ADMIN — SODIUM CHLORIDE, PRESERVATIVE FREE 10 ML: 5 INJECTION INTRAVENOUS at 09:07

## 2021-09-20 NOTE — PROGRESS NOTES
PORT FLUSH    Patient presents to clinic for [facility] today. single  SQ port accessed per policy using 89S, . 75 inch Iglesias needle for good blood return. Aspirate for waste and specimen sent to lab. Site flushed easily with 10 mL NSS followed by 5 mL Heparin solution 100 units/ml rinse prior to de-access. Dry sterile dressing to area. Tolerated procedure well. Encouraged to schedule port flush every 4 weeks.

## 2021-09-21 ENCOUNTER — HOSPITAL ENCOUNTER (OUTPATIENT)
Dept: INFUSION THERAPY | Age: 61
Discharge: HOME | End: 2021-09-21
Payer: COMMERCIAL

## 2021-09-21 ENCOUNTER — OFFICE VISIT (OUTPATIENT)
Dept: ONCOLOGY | Age: 61
End: 2021-09-21
Payer: COMMERCIAL

## 2021-09-21 VITALS
DIASTOLIC BLOOD PRESSURE: 68 MMHG | OXYGEN SATURATION: 98 % | WEIGHT: 136.4 LBS | BODY MASS INDEX: 23.29 KG/M2 | TEMPERATURE: 98 F | HEIGHT: 64 IN | HEART RATE: 89 BPM | SYSTOLIC BLOOD PRESSURE: 132 MMHG

## 2021-09-21 DIAGNOSIS — C20 RECTAL CANCER (HCC): Primary | ICD-10-CM

## 2021-09-21 PROCEDURE — 96368 THER/DIAG CONCURRENT INF: CPT

## 2021-09-21 PROCEDURE — 6360000002 HC RX W HCPCS: Performed by: INTERNAL MEDICINE

## 2021-09-21 PROCEDURE — 99214 OFFICE O/P EST MOD 30 MIN: CPT | Performed by: INTERNAL MEDICINE

## 2021-09-21 PROCEDURE — 96416 CHEMO PROLONG INFUSE W/PUMP: CPT

## 2021-09-21 PROCEDURE — G8427 DOCREV CUR MEDS BY ELIG CLIN: HCPCS | Performed by: INTERNAL MEDICINE

## 2021-09-21 PROCEDURE — 96375 TX/PRO/DX INJ NEW DRUG ADDON: CPT

## 2021-09-21 PROCEDURE — G8420 CALC BMI NORM PARAMETERS: HCPCS | Performed by: INTERNAL MEDICINE

## 2021-09-21 PROCEDURE — 6360000002 HC RX W HCPCS

## 2021-09-21 PROCEDURE — 3017F COLORECTAL CA SCREEN DOC REV: CPT | Performed by: INTERNAL MEDICINE

## 2021-09-21 PROCEDURE — 2580000003 HC RX 258: Performed by: INTERNAL MEDICINE

## 2021-09-21 PROCEDURE — 96415 CHEMO IV INFUSION ADDL HR: CPT

## 2021-09-21 PROCEDURE — 96417 CHEMO IV INFUS EACH ADDL SEQ: CPT

## 2021-09-21 PROCEDURE — 96413 CHEMO IV INFUSION 1 HR: CPT

## 2021-09-21 PROCEDURE — 96411 CHEMO IV PUSH ADDL DRUG: CPT

## 2021-09-21 PROCEDURE — 96366 THER/PROPH/DIAG IV INF ADDON: CPT

## 2021-09-21 PROCEDURE — 4004F PT TOBACCO SCREEN RCVD TLK: CPT | Performed by: INTERNAL MEDICINE

## 2021-09-21 RX ORDER — MEPERIDINE HYDROCHLORIDE 25 MG/ML
12.5 INJECTION INTRAMUSCULAR; INTRAVENOUS; SUBCUTANEOUS ONCE
Status: CANCELLED | OUTPATIENT
Start: 2021-09-21 | End: 2021-09-21

## 2021-09-21 RX ORDER — FLUOROURACIL 50 MG/ML
400 INJECTION, SOLUTION INTRAVENOUS ONCE
Status: COMPLETED | OUTPATIENT
Start: 2021-09-21 | End: 2021-09-21

## 2021-09-21 RX ORDER — DEXAMETHASONE SODIUM PHOSPHATE 10 MG/ML
10 INJECTION, SOLUTION INTRAMUSCULAR; INTRAVENOUS ONCE
Status: CANCELLED | OUTPATIENT
Start: 2021-09-21

## 2021-09-21 RX ORDER — SODIUM CHLORIDE 0.9 % (FLUSH) 0.9 %
5-40 SYRINGE (ML) INJECTION PRN
Status: CANCELLED | OUTPATIENT
Start: 2021-09-21

## 2021-09-21 RX ORDER — PALONOSETRON HYDROCHLORIDE 0.05 MG/ML
INJECTION, SOLUTION INTRAVENOUS
Status: COMPLETED
Start: 2021-09-21 | End: 2021-09-21

## 2021-09-21 RX ORDER — DEXTROSE MONOHYDRATE 50 MG/ML
INJECTION, SOLUTION INTRAVENOUS ONCE
Status: COMPLETED | OUTPATIENT
Start: 2021-09-21 | End: 2021-09-21

## 2021-09-21 RX ORDER — DEXAMETHASONE SODIUM PHOSPHATE 10 MG/ML
INJECTION INTRAMUSCULAR; INTRAVENOUS
Status: COMPLETED
Start: 2021-09-21 | End: 2021-09-21

## 2021-09-21 RX ORDER — DIPHENHYDRAMINE HYDROCHLORIDE 50 MG/ML
50 INJECTION INTRAMUSCULAR; INTRAVENOUS ONCE
Status: CANCELLED | OUTPATIENT
Start: 2021-09-21 | End: 2021-09-21

## 2021-09-21 RX ORDER — SODIUM CHLORIDE 9 MG/ML
25 INJECTION, SOLUTION INTRAVENOUS PRN
Status: CANCELLED | OUTPATIENT
Start: 2021-09-21

## 2021-09-21 RX ORDER — HEPARIN SODIUM (PORCINE) LOCK FLUSH IV SOLN 100 UNIT/ML 100 UNIT/ML
500 SOLUTION INTRAVENOUS PRN
Status: DISCONTINUED | OUTPATIENT
Start: 2021-09-21 | End: 2021-09-22 | Stop reason: HOSPADM

## 2021-09-21 RX ORDER — SODIUM CHLORIDE 0.9 % (FLUSH) 0.9 %
5-40 SYRINGE (ML) INJECTION PRN
Status: DISCONTINUED | OUTPATIENT
Start: 2021-09-21 | End: 2021-09-22 | Stop reason: HOSPADM

## 2021-09-21 RX ORDER — DEXAMETHASONE SODIUM PHOSPHATE 10 MG/ML
10 INJECTION INTRAMUSCULAR; INTRAVENOUS ONCE
Status: COMPLETED | OUTPATIENT
Start: 2021-09-21 | End: 2021-09-21

## 2021-09-21 RX ORDER — FLUOROURACIL 50 MG/ML
400 INJECTION, SOLUTION INTRAVENOUS ONCE
Status: CANCELLED | OUTPATIENT
Start: 2021-09-21

## 2021-09-21 RX ORDER — PALONOSETRON HYDROCHLORIDE 0.05 MG/ML
0.25 INJECTION, SOLUTION INTRAVENOUS ONCE
Status: COMPLETED | OUTPATIENT
Start: 2021-09-21 | End: 2021-09-21

## 2021-09-21 RX ORDER — PALONOSETRON HYDROCHLORIDE 0.05 MG/ML
0.25 INJECTION, SOLUTION INTRAVENOUS ONCE
Status: CANCELLED | OUTPATIENT
Start: 2021-09-21

## 2021-09-21 RX ORDER — EPINEPHRINE 1 MG/ML
0.3 INJECTION, SOLUTION, CONCENTRATE INTRAVENOUS PRN
Status: CANCELLED | OUTPATIENT
Start: 2021-09-21

## 2021-09-21 RX ORDER — POTASSIUM CHLORIDE 20 MEQ/1
40 TABLET, EXTENDED RELEASE ORAL DAILY
Qty: 6 TABLET | Refills: 0 | Status: SHIPPED
Start: 2021-09-21 | End: 2021-10-26 | Stop reason: SDUPTHER

## 2021-09-21 RX ORDER — SODIUM CHLORIDE 9 MG/ML
INJECTION, SOLUTION INTRAVENOUS CONTINUOUS
Status: CANCELLED | OUTPATIENT
Start: 2021-09-21

## 2021-09-21 RX ORDER — METHYLPREDNISOLONE SODIUM SUCCINATE 125 MG/2ML
125 INJECTION, POWDER, LYOPHILIZED, FOR SOLUTION INTRAMUSCULAR; INTRAVENOUS ONCE
Status: CANCELLED | OUTPATIENT
Start: 2021-09-21 | End: 2021-09-21

## 2021-09-21 RX ORDER — SODIUM CHLORIDE 9 MG/ML
INJECTION, SOLUTION INTRAVENOUS ONCE
Status: COMPLETED | OUTPATIENT
Start: 2021-09-21 | End: 2021-09-21

## 2021-09-21 RX ORDER — SODIUM CHLORIDE 9 MG/ML
INJECTION, SOLUTION INTRAVENOUS ONCE
Status: CANCELLED | OUTPATIENT
Start: 2021-09-21 | End: 2021-09-21

## 2021-09-21 RX ORDER — HEPARIN SODIUM (PORCINE) LOCK FLUSH IV SOLN 100 UNIT/ML 100 UNIT/ML
500 SOLUTION INTRAVENOUS PRN
Status: CANCELLED | OUTPATIENT
Start: 2021-09-21

## 2021-09-21 RX ORDER — DEXTROSE MONOHYDRATE 50 MG/ML
INJECTION, SOLUTION INTRAVENOUS ONCE
Status: CANCELLED | OUTPATIENT
Start: 2021-09-21 | End: 2021-09-21

## 2021-09-21 RX ADMIN — DEXTROSE MONOHYDRATE: 50 INJECTION, SOLUTION INTRAVENOUS at 11:06

## 2021-09-21 RX ADMIN — FLUOROURACIL 700 MG: 50 INJECTION, SOLUTION INTRAVENOUS at 13:20

## 2021-09-21 RX ADMIN — DEXAMETHASONE SODIUM PHOSPHATE 10 MG: 10 INJECTION INTRAMUSCULAR; INTRAVENOUS at 10:00

## 2021-09-21 RX ADMIN — OXALIPLATIN 145 MG: 5 INJECTION, SOLUTION INTRAVENOUS at 11:06

## 2021-09-21 RX ADMIN — SODIUM CHLORIDE: 9 INJECTION, SOLUTION INTRAVENOUS at 10:00

## 2021-09-21 RX ADMIN — PALONOSETRON HYDROCHLORIDE 0.25 MG: 0.05 INJECTION, SOLUTION INTRAVENOUS at 10:00

## 2021-09-21 RX ADMIN — PALONOSETRON 0.25 MG: 0.25 INJECTION, SOLUTION INTRAVENOUS at 10:00

## 2021-09-21 RX ADMIN — LEUCOVORIN CALCIUM 700 MG: 350 INJECTION, POWDER, LYOPHILIZED, FOR SOLUTION INTRAMUSCULAR; INTRAVENOUS at 11:06

## 2021-09-21 RX ADMIN — FLUOROURACIL 4150 MG: 50 INJECTION, SOLUTION INTRAVENOUS at 13:25

## 2021-09-21 RX ADMIN — SODIUM CHLORIDE 300 MG: 900 INJECTION, SOLUTION INTRAVENOUS at 10:25

## 2021-09-21 NOTE — PROGRESS NOTES
Department of [place] [address]   Attending Clinic Note    Reason for Visit: Follow-up on a patient with Rectal cancer    PCP:  Yas Ray MD    History of Present Illness:  61 y/o female who was complaining of diarrhea blood in stool and painful defecation    CT abdomen/pelvis 06/16/2021:  Circumferentially infiltrative mass in the upper rectum, consistent with malignancy  Metastatic lymph nodes in the adjacent mesorectum, as well as the precaval region of the upper abdomen. Multiple prominent liver metastases    CTA chest 06/16/2021 noted no evidence of PE.  7 mm nodule seen within the right lower lobe posteriorly worrisome for metastatic disease. No region of intrathoracic lymphadenopathy is identified    Flex sigmoidoscopy 06/29/2021:  Nearly complete obstructive rectal mass at 15cms from AV  Colon, rectum, biopsy - Colonic mucosa with high-grade dysplasia; see comment. COMMENT   Additional deeper levels were also examined    MRI Rectum on 06/29/2021:  7.7 cm rectosigmoid mass spanning and invading the peritoneal reflection with superior rectal lymphadenopathy. Stage: T4a N2  MRF: Clear (tumor margin >2 mm from MRF)   Sphincter involvement: No.   Suspicious extra mesorectal lymph nodes: No    CEA 25.4 on 07/14/2021    Laparoscopic loop sigmoid colostomy and liver biopsy 07/15/2021  Liver, biopsy - Adenocarcinoma, morphologically compatible with colorectal primary  NGS testing ([facility]) ordered and pending    Case discussed with Dr. Chriss King [facility]  Systemic chemotherapy consists of FOLFOX + Avastin. Side effects reviewed. She agreed to proceed. Mediport placed  Cycle # 1 FOLFOX + Avastin was on 08/17/2021  Was in ED 08/30/2021 for hypoK+ and HypoMg but left AMA  Cycle # 2 FOLFOX + Avastin was on 09/07/2021. Cycle # 3 FOLFOX + Avastin is today 09/21/2021  Today 09/21/2021. No fever, chills. No N/V. Energy level improved. Review of Systems;  CONSTITUTIONAL: No fever, chills. Fair appetite. Energy level improved. ENMT: Eyes: No diplopia; Nose: No epistaxis. Mouth: No sore throat. RESPIRATORY: No hemoptysis, shortness of breath, cough. CARDIOVASCULAR: No chest pain, palpitations. GASTROINTESTINAL: No N.V, abdominal pain  GENITOURINARY: No dysuria, urinary frequency, hematuria. NEURO: No syncope, presyncope, headache. Remainder:ROS NEGATIVE    Past Medical History:      Diagnosis Date    Alcoholic ([facility]     Anxiety     Cancer ([facility]     Chronic right shoulder pain     [facility] 09/16/2011    Depression     Depression 12/14/2020    GERD (gastroesophageal reflux disease)     Headache     HTN (hypertension) 06/16/2015    Mixed hyperlipidemia 02/17/2016    Osteoarthritis     Psychiatric problem     Substance abuse (HCC)     methadone     Medications:  Reviewed and reconciled. Allergies:  No Known Allergies    Physical Exam:  /68   Pulse 89   Temp 98 °F (36.7 °C)   Ht 5' 3.5\" (1.613 m)   Wt 136 lb 6.4 oz (61.9 kg)   LMP 09/08/2011   SpO2 98%   BMI 23.78 kg/m²   GENERAL: Alert, oriented x 3, not in distress  HEENT: PERRLA; EOMI. Oropharynx clear. NECK: Supple. Without lymphadenopathy. LUNGS: Good air entry bilaterally. No wheezing, crackles or ronchi. CARDIOVASCULAR: Regular rate. No murmurs, rubs or gallops. ABDOMEN: Soft. Non-tender, non-distended. Positive bowel sounds  EXTREMITIES: Without clubbing, cyanosis, or edema. NEUROLOGIC: No focal deficits.    ECOG PS 1    Lab Results   Component Value Date    WBC 6.7 09/20/2021    HGB 11.2 (L) 09/20/2021    HCT 33.9 (L) 09/20/2021    MCV 87.6 09/20/2021     09/20/2021     Lab Results   Component Value Date     09/20/2021    K 3.2 (L) 09/20/2021     09/20/2021    CO2 27 09/20/2021    BUN 8 09/20/2021    CREATININE 0.5 09/20/2021    GLUCOSE 106 (H) 09/20/2021    CALCIUM 9.3 09/20/2021    PROT 7.6 09/20/2021    LABALBU 3.9 09/20/2021    BILITOT 0.2 09/20/2021    ALKPHOS 163 (H) 09/20/2021    AST 28 09/20/2021    ALT 28 09/20/2021    LABGLOM >60 09/20/2021    GFRAA >60 09/20/2021     Lab Results   Component Value Date    MG 1.7 09/20/2021     Lab Results   Component Value Date    CEA 13.8 (H) 09/20/2021     Impression/Plan:  63 y/o female with clinical stage: [id number] Rectosigmoid Cancer  CT abdomen/pelvis 06/16/2021:  Circumferentially infiltrative mass in the upper rectum, consistent with malignancy  Metastatic lymph nodes in the adjacent mesorectum, as well as the precaval region of the upper abdomen. Multiple prominent liver metastases    CTA chest 06/16/2021 noted no evidence of PE.  7 mm nodule seen within the right lower lobe posteriorly worrisome for metastatic disease. No region of intrathoracic lymphadenopathy is identified    Flex sigmoidoscopy 06/29/2021:  Nearly complete obstructive rectal mass at 15cms from AV  Colon, rectum, biopsy - Colonic mucosa with high-grade dysplasia; see comment. COMMENT   Additional deeper levels were also examined    MRI Rectum on 06/29/2021:  7.7 cm rectosigmoid mass spanning and invading the peritoneal reflection with superior rectal lymphadenopathy. Stage: T4a N2  MRF: Clear (tumor margin >2 mm from MRF)   Sphincter involvement: No.   Suspicious extra mesorectal lymph nodes: No    CEA 25.4 on 07/14/2021    Laparoscopic loop sigmoid colostomy and liver biopsy 07/15/2021  Liver, biopsy - Adenocarcinoma, morphologically compatible with colorectal primary  NGS testing ([facility]) ordered and pending    Case discussed with Dr. Martin Oropeza [facility]  Systemic chemotherapy consists of FOLFOX + Avastin. Side effects reviewed. She agreed to proceed. Mediport placed  Cycle # 1 FOLFOX + Avastin was on 08/17/2021. Was in ED 08/30/2021 for hypoK+ and HypoMg but left AMA  Cycle # 2 FOLFOX + Avastin was on 09/07/2021. CEA 16.4 on 09/07/2021. Cycle # 3 FOLFOX + Avastin is today 09/21/2021. CEA 13.8 on 09/20/2021. HypoK+ to be replaced.     RTC 2 weeks for

## 2021-09-23 ENCOUNTER — HOSPITAL ENCOUNTER (OUTPATIENT)
Dept: INFUSION THERAPY | Age: 61
Discharge: HOME | End: 2021-09-23
Payer: COMMERCIAL

## 2021-09-23 DIAGNOSIS — I87.8 POOR VENOUS ACCESS: Primary | ICD-10-CM

## 2021-09-23 DIAGNOSIS — C20 RECTAL CANCER (HCC): ICD-10-CM

## 2021-09-23 PROCEDURE — 6360000002 HC RX W HCPCS: Performed by: NURSE PRACTITIONER

## 2021-09-23 PROCEDURE — 99214 OFFICE O/P EST MOD 30 MIN: CPT

## 2021-09-23 PROCEDURE — 2580000003 HC RX 258: Performed by: NURSE PRACTITIONER

## 2021-09-23 RX ORDER — SODIUM CHLORIDE 0.9 % (FLUSH) 0.9 %
5-40 SYRINGE (ML) INJECTION PRN
Status: DISCONTINUED | OUTPATIENT
Start: 2021-09-23 | End: 2021-09-24 | Stop reason: HOSPADM

## 2021-09-23 RX ORDER — SODIUM CHLORIDE 0.9 % (FLUSH) 0.9 %
5-40 SYRINGE (ML) INJECTION PRN
Status: CANCELLED | OUTPATIENT
Start: 2021-09-23

## 2021-09-23 RX ORDER — HEPARIN SODIUM (PORCINE) LOCK FLUSH IV SOLN 100 UNIT/ML 100 UNIT/ML
500 SOLUTION INTRAVENOUS PRN
Status: DISCONTINUED | OUTPATIENT
Start: 2021-09-23 | End: 2021-09-24 | Stop reason: HOSPADM

## 2021-09-23 RX ORDER — HEPARIN SODIUM (PORCINE) LOCK FLUSH IV SOLN 100 UNIT/ML 100 UNIT/ML
500 SOLUTION INTRAVENOUS PRN
Status: CANCELLED | OUTPATIENT
Start: 2021-09-23

## 2021-09-23 RX ORDER — SODIUM CHLORIDE 9 MG/ML
25 INJECTION, SOLUTION INTRAVENOUS PRN
Status: CANCELLED | OUTPATIENT
Start: 2021-09-23

## 2021-09-23 RX ADMIN — HEPARIN 500 UNITS: 100 SYRINGE at 13:28

## 2021-09-23 RX ADMIN — SODIUM CHLORIDE, PRESERVATIVE FREE 10 ML: 5 INJECTION INTRAVENOUS at 13:28

## 2021-09-23 NOTE — PROGRESS NOTES
Presents to clinic for CADD pump removal. Port site appears normal. Denies problems/concerns. Received 255  ml of 5-FU & reservoir 5 of 5-FU. Port flushed with 10 ml. NSS followed by 5 ml. Heparin Rinse prior to de access. DSD to area. Tolerated well. Encouraged to call clinic with questions/concerns.

## 2021-10-01 DIAGNOSIS — C20 RECTAL CANCER (HCC): Primary | ICD-10-CM

## 2021-10-04 ENCOUNTER — HOSPITAL ENCOUNTER (OUTPATIENT)
Dept: INFUSION THERAPY | Age: 61
Discharge: HOME | End: 2021-10-04
Payer: COMMERCIAL

## 2021-10-04 ENCOUNTER — APPOINTMENT (OUTPATIENT)
Dept: INFUSION THERAPY | Age: 61
End: 2021-10-04
Payer: COMMERCIAL

## 2021-10-04 DIAGNOSIS — I87.8 POOR VENOUS ACCESS: ICD-10-CM

## 2021-10-04 DIAGNOSIS — C20 RECTAL CANCER (HCC): Primary | ICD-10-CM

## 2021-10-04 LAB
ALBUMIN SERPL-MCNC: 3.7 G/DL (ref 3.5–5.2)
ALP BLD-CCNC: 123 U/L (ref 35–104)
ALT SERPL-CCNC: 10 U/L (ref 0–32)
ANION GAP SERPL CALCULATED.3IONS-SCNC: 12 MMOL/L (ref 7–16)
AST SERPL-CCNC: 14 U/L (ref 0–31)
BASOPHILS ABSOLUTE: 0.03 E9/L (ref 0–0.2)
BASOPHILS RELATIVE PERCENT: 0.4 % (ref 0–2)
BILIRUB SERPL-MCNC: 0.5 MG/DL (ref 0–1.2)
BUN BLDV-MCNC: 8 MG/DL (ref 6–23)
CALCIUM SERPL-MCNC: 8.7 MG/DL (ref 8.6–10.2)
CEA: 12.3 NG/ML (ref 0–5.2)
CHLORIDE BLD-SCNC: 101 MMOL/L (ref 98–107)
CO2: 26 MMOL/L (ref 22–29)
CREAT SERPL-MCNC: 0.5 MG/DL (ref 0.5–1)
EOSINOPHILS ABSOLUTE: 0.08 E9/L (ref 0.05–0.5)
EOSINOPHILS RELATIVE PERCENT: 1.2 % (ref 0–6)
GFR AFRICAN AMERICAN: >60
GFR NON-AFRICAN AMERICAN: >60 ML/MIN/1.73
GLUCOSE BLD-MCNC: 101 MG/DL (ref 74–99)
HCT VFR BLD CALC: 33.8 % (ref 34–48)
HEMOGLOBIN: 11.1 G/DL (ref 11.5–15.5)
IMMATURE GRANULOCYTES #: 0.02 E9/L
IMMATURE GRANULOCYTES %: 0.3 % (ref 0–5)
LYMPHOCYTES ABSOLUTE: 2.8 E9/L (ref 1.5–4)
LYMPHOCYTES RELATIVE PERCENT: 41.1 % (ref 20–42)
MAGNESIUM: 1.3 MG/DL (ref 1.6–2.6)
MCH RBC QN AUTO: 29.3 PG (ref 26–35)
MCHC RBC AUTO-ENTMCNC: 32.8 % (ref 32–34.5)
MCV RBC AUTO: 89.2 FL (ref 80–99.9)
MONOCYTES ABSOLUTE: 0.68 E9/L (ref 0.1–0.95)
MONOCYTES RELATIVE PERCENT: 10 % (ref 2–12)
NEUTROPHILS ABSOLUTE: 3.21 E9/L (ref 1.8–7.3)
NEUTROPHILS RELATIVE PERCENT: 47 % (ref 43–80)
PDW BLD-RTO: 17.3 FL (ref 11.5–15)
PLATELET # BLD: 181 E9/L (ref 130–450)
PMV BLD AUTO: 10.1 FL (ref 7–12)
POTASSIUM SERPL-SCNC: 2.6 MMOL/L (ref 3.5–5)
RBC # BLD: 3.79 E12/L (ref 3.5–5.5)
SODIUM BLD-SCNC: 139 MMOL/L (ref 132–146)
TOTAL PROTEIN: 6.7 G/DL (ref 6.4–8.3)
WBC # BLD: 6.8 E9/L (ref 4.5–11.5)

## 2021-10-04 PROCEDURE — 83735 ASSAY OF MAGNESIUM: CPT

## 2021-10-04 PROCEDURE — 82378 CARCINOEMBRYONIC ANTIGEN: CPT

## 2021-10-04 PROCEDURE — 36591 DRAW BLOOD OFF VENOUS DEVICE: CPT

## 2021-10-04 PROCEDURE — 6360000002 HC RX W HCPCS: Performed by: NURSE PRACTITIONER

## 2021-10-04 PROCEDURE — 2580000003 HC RX 258: Performed by: NURSE PRACTITIONER

## 2021-10-04 PROCEDURE — 85025 COMPLETE CBC W/AUTO DIFF WBC: CPT

## 2021-10-04 PROCEDURE — 80053 COMPREHEN METABOLIC PANEL: CPT

## 2021-10-04 RX ORDER — HEPARIN SODIUM (PORCINE) LOCK FLUSH IV SOLN 100 UNIT/ML 100 UNIT/ML
500 SOLUTION INTRAVENOUS PRN
Status: CANCELLED | OUTPATIENT
Start: 2021-10-04

## 2021-10-04 RX ORDER — HEPARIN SODIUM (PORCINE) LOCK FLUSH IV SOLN 100 UNIT/ML 100 UNIT/ML
500 SOLUTION INTRAVENOUS PRN
Status: DISCONTINUED | OUTPATIENT
Start: 2021-10-04 | End: 2021-10-05 | Stop reason: HOSPADM

## 2021-10-04 RX ORDER — SODIUM CHLORIDE 9 MG/ML
25 INJECTION, SOLUTION INTRAVENOUS PRN
Status: DISCONTINUED | OUTPATIENT
Start: 2021-10-04 | End: 2021-10-05 | Stop reason: HOSPADM

## 2021-10-04 RX ORDER — SODIUM CHLORIDE 0.9 % (FLUSH) 0.9 %
5-40 SYRINGE (ML) INJECTION PRN
Status: DISCONTINUED | OUTPATIENT
Start: 2021-10-04 | End: 2021-10-05 | Stop reason: HOSPADM

## 2021-10-04 RX ORDER — SODIUM CHLORIDE 0.9 % (FLUSH) 0.9 %
5-40 SYRINGE (ML) INJECTION PRN
Status: CANCELLED | OUTPATIENT
Start: 2021-10-04

## 2021-10-04 RX ORDER — SODIUM CHLORIDE 9 MG/ML
25 INJECTION, SOLUTION INTRAVENOUS PRN
Status: CANCELLED | OUTPATIENT
Start: 2021-10-04

## 2021-10-04 RX ADMIN — Medication 10 ML: at 14:29

## 2021-10-04 RX ADMIN — Medication 500 UNITS: at 14:29

## 2021-10-04 NOTE — PROGRESS NOTES
Advised patient that her potassium is 2.59. Per Dr. Sally Cordoba, patient needs to go to the ER as soon as possible to get K+ replacement. Patient verbalized understanding.   Linnea Knight RN 10/4/2021 [id number]

## 2021-10-04 NOTE — PROGRESS NOTES
Patient presents to clinic for labs today. 20  SQ port accessed per policy using 6.06 Iglesias needle for good blood return. Aspirate for waste and specimen sent to lab. Site flushed easily with 10 mL NSS followed by 5 mL Heparin solution 100 units/ml rinse prior to de-access. Dry sterile dressing to area. Tolerated procedure well. Encouraged to schedule port flush every 4 weeks.

## 2021-10-05 ENCOUNTER — OFFICE VISIT (OUTPATIENT)
Dept: ONCOLOGY | Age: 61
End: 2021-10-05
Payer: COMMERCIAL

## 2021-10-05 ENCOUNTER — HOSPITAL ENCOUNTER (OUTPATIENT)
Dept: INFUSION THERAPY | Age: 61
Discharge: HOME | End: 2021-10-05
Payer: COMMERCIAL

## 2021-10-05 ENCOUNTER — TELEPHONE (OUTPATIENT)
Dept: CASE MANAGEMENT | Age: 61
End: 2021-10-05

## 2021-10-05 VITALS
TEMPERATURE: 98.7 F | WEIGHT: 131.2 LBS | DIASTOLIC BLOOD PRESSURE: 82 MMHG | HEART RATE: 88 BPM | OXYGEN SATURATION: 98 % | BODY MASS INDEX: 22.4 KG/M2 | SYSTOLIC BLOOD PRESSURE: 149 MMHG | HEIGHT: 64 IN

## 2021-10-05 VITALS — SYSTOLIC BLOOD PRESSURE: 117 MMHG | DIASTOLIC BLOOD PRESSURE: 63 MMHG | HEART RATE: 60 BPM

## 2021-10-05 DIAGNOSIS — K13.79 MOUTH SORES: ICD-10-CM

## 2021-10-05 DIAGNOSIS — C20 RECTAL CANCER (HCC): Primary | ICD-10-CM

## 2021-10-05 PROCEDURE — 96365 THER/PROPH/DIAG IV INF INIT: CPT

## 2021-10-05 PROCEDURE — G8484 FLU IMMUNIZE NO ADMIN: HCPCS | Performed by: INTERNAL MEDICINE

## 2021-10-05 PROCEDURE — 6360000002 HC RX W HCPCS: Performed by: INTERNAL MEDICINE

## 2021-10-05 PROCEDURE — 96368 THER/DIAG CONCURRENT INF: CPT

## 2021-10-05 PROCEDURE — G8427 DOCREV CUR MEDS BY ELIG CLIN: HCPCS | Performed by: INTERNAL MEDICINE

## 2021-10-05 PROCEDURE — 2580000003 HC RX 258: Performed by: INTERNAL MEDICINE

## 2021-10-05 PROCEDURE — 99214 OFFICE O/P EST MOD 30 MIN: CPT | Performed by: INTERNAL MEDICINE

## 2021-10-05 PROCEDURE — 4004F PT TOBACCO SCREEN RCVD TLK: CPT | Performed by: INTERNAL MEDICINE

## 2021-10-05 PROCEDURE — 3017F COLORECTAL CA SCREEN DOC REV: CPT | Performed by: INTERNAL MEDICINE

## 2021-10-05 PROCEDURE — 96366 THER/PROPH/DIAG IV INF ADDON: CPT

## 2021-10-05 PROCEDURE — G8420 CALC BMI NORM PARAMETERS: HCPCS | Performed by: INTERNAL MEDICINE

## 2021-10-05 RX ORDER — HEPARIN SODIUM (PORCINE) LOCK FLUSH IV SOLN 100 UNIT/ML 100 UNIT/ML
500 SOLUTION INTRAVENOUS PRN
Status: DISCONTINUED | OUTPATIENT
Start: 2021-10-05 | End: 2021-10-06 | Stop reason: HOSPADM

## 2021-10-05 RX ORDER — HEPARIN SODIUM (PORCINE) LOCK FLUSH IV SOLN 100 UNIT/ML 100 UNIT/ML
500 SOLUTION INTRAVENOUS PRN
Status: CANCELLED | OUTPATIENT
Start: 2021-10-05

## 2021-10-05 RX ORDER — SODIUM CHLORIDE 9 MG/ML
25 INJECTION, SOLUTION INTRAVENOUS PRN
Status: CANCELLED | OUTPATIENT
Start: 2021-10-05

## 2021-10-05 RX ORDER — SODIUM CHLORIDE 9 MG/ML
INJECTION, SOLUTION INTRAVENOUS CONTINUOUS
Status: DISCONTINUED | OUTPATIENT
Start: 2021-10-05 | End: 2021-10-06 | Stop reason: HOSPADM

## 2021-10-05 RX ORDER — SODIUM CHLORIDE 9 MG/ML
INJECTION, SOLUTION INTRAVENOUS CONTINUOUS
Status: CANCELLED
Start: 2021-10-05

## 2021-10-05 RX ORDER — 0.9 % SODIUM CHLORIDE 0.9 %
250 INTRAVENOUS SOLUTION INTRAVENOUS CONTINUOUS
Status: CANCELLED | OUTPATIENT
Start: 2021-10-05

## 2021-10-05 RX ORDER — MAGNESIUM SULFATE IN WATER 40 MG/ML
4000 INJECTION, SOLUTION INTRAVENOUS ONCE
Status: COMPLETED | OUTPATIENT
Start: 2021-10-05 | End: 2021-10-05

## 2021-10-05 RX ORDER — POTASSIUM CHLORIDE 29.8 MG/ML
40 INJECTION INTRAVENOUS ONCE
Status: CANCELLED
Start: 2021-10-05 | End: 2021-10-05

## 2021-10-05 RX ORDER — MAGNESIUM SULFATE IN WATER 40 MG/ML
4000 INJECTION, SOLUTION INTRAVENOUS ONCE
Status: CANCELLED
Start: 2021-10-05 | End: 2021-10-05

## 2021-10-05 RX ORDER — SODIUM CHLORIDE 0.9 % (FLUSH) 0.9 %
5-40 SYRINGE (ML) INJECTION PRN
Status: DISCONTINUED | OUTPATIENT
Start: 2021-10-05 | End: 2021-10-06 | Stop reason: HOSPADM

## 2021-10-05 RX ORDER — SODIUM CHLORIDE 9 MG/ML
25 INJECTION, SOLUTION INTRAVENOUS PRN
Status: DISCONTINUED | OUTPATIENT
Start: 2021-10-05 | End: 2021-10-06 | Stop reason: HOSPADM

## 2021-10-05 RX ORDER — SODIUM CHLORIDE 0.9 % (FLUSH) 0.9 %
5-40 SYRINGE (ML) INJECTION PRN
Status: CANCELLED | OUTPATIENT
Start: 2021-10-05

## 2021-10-05 RX ORDER — POTASSIUM CHLORIDE 20 MEQ/1
40 TABLET, EXTENDED RELEASE ORAL 2 TIMES DAILY
Qty: 40 TABLET | Refills: 0 | Status: SHIPPED | OUTPATIENT
Start: 2021-10-05 | End: 2021-10-15

## 2021-10-05 RX ORDER — POTASSIUM CHLORIDE 29.8 MG/ML
40 INJECTION INTRAVENOUS ONCE
Status: COMPLETED | OUTPATIENT
Start: 2021-10-05 | End: 2021-10-05

## 2021-10-05 RX ADMIN — SODIUM CHLORIDE: 9 INJECTION, SOLUTION INTRAVENOUS at 09:17

## 2021-10-05 RX ADMIN — MAGNESIUM SULFATE HEPTAHYDRATE 4000 MG: 40 INJECTION, SOLUTION INTRAVENOUS at 09:17

## 2021-10-05 RX ADMIN — POTASSIUM CHLORIDE 40 MEQ: 29.8 INJECTION, SOLUTION INTRAVENOUS at 09:17

## 2021-10-05 RX ADMIN — Medication 500 UNITS: at 13:54

## 2021-10-05 RX ADMIN — SODIUM CHLORIDE, PRESERVATIVE FREE 10 ML: 5 INJECTION INTRAVENOUS at 13:54

## 2021-10-05 NOTE — PROGRESS NOTES
Department of [place] [address]   Attending Clinic Note    Reason for Visit: Follow-up on a patient with Rectal cancer    PCP:  Alberto Cool MD    History of Present Illness:  63 y/o female who was complaining of diarrhea blood in stool and painful defecation    CT abdomen/pelvis 06/16/2021:  Circumferentially infiltrative mass in the upper rectum, consistent with malignancy  Metastatic lymph nodes in the adjacent mesorectum, as well as the precaval region of the upper abdomen. Multiple prominent liver metastases    CTA chest 06/16/2021 noted no evidence of PE.  7 mm nodule seen within the right lower lobe posteriorly worrisome for metastatic disease. No region of intrathoracic lymphadenopathy is identified    Flex sigmoidoscopy 06/29/2021:  Nearly complete obstructive rectal mass at 15cms from AV  Colon, rectum, biopsy - Colonic mucosa with high-grade dysplasia; see comment. COMMENT   Additional deeper levels were also examined    MRI Rectum on 06/29/2021:  7.7 cm rectosigmoid mass spanning and invading the peritoneal reflection with superior rectal lymphadenopathy. Stage: T4a N2  MRF: Clear (tumor margin >2 mm from MRF)   Sphincter involvement: No.   Suspicious extra mesorectal lymph nodes: No    CEA 25.4 on 07/14/2021    Laparoscopic loop sigmoid colostomy and liver biopsy 07/15/2021  Liver, biopsy - Adenocarcinoma, morphologically compatible with colorectal primary  NGS testing ([facility]) ordered and pending    Case discussed with Dr. Moon Keating [facility]  Systemic chemotherapy consists of FOLFOX + Avastin. Side effects reviewed. She agreed to proceed. Mediport placed  Cycle # 1 FOLFOX + Avastin was on 08/17/2021  Was in ED 08/30/2021 for hypoK+ and HypoMg but left AMA  Cycle # 2 FOLFOX + Avastin was on 09/07/2021. Cycle # 3 FOLFOX + Avastin was on 09/21/2021. Today 10/05/2021. No fever, chills. No N/V. HypoK+  HypoMg didn't go to ER yesterday despite our recommendations to do that.      Review of Systems;  CONSTITUTIONAL: No fever, chills. Fair appetite and energy level. ENMT: Eyes: No diplopia; Nose: No epistaxis. Mouth: No sore throat. RESPIRATORY: No hemoptysis, shortness of breath, cough. CARDIOVASCULAR: No chest pain, palpitations. GASTROINTESTINAL: No N.V, abdominal pain  GENITOURINARY: No dysuria, urinary frequency, hematuria. NEURO: No syncope, presyncope, headache. Remainder:ROS NEGATIVE    Past Medical History:      Diagnosis Date    [email or web address]     Anxiety     Cancer ([facility]     Chronic right shoulder pain     Convulsions/seizures ([email or web address] 09/16/2011    Depression     Depression 12/14/2020    GERD (gastroesophageal reflux disease)     Headache     HTN (hypertension) 06/16/2015    Mixed hyperlipidemia 02/17/2016    Osteoarthritis     Psychiatric problem     Substance abuse (HCC)     methadone     Medications:  Reviewed and reconciled. Allergies:  No Known Allergies    Physical Exam:  BP (!) 149/82   Pulse 88   Temp 98.7 °F (37.1 °C)   Ht 5' 3.5\" (1.613 m)   Wt 131 lb 3.2 oz (59.5 kg)   LMP 09/08/2011   SpO2 98%   BMI 22.88 kg/m²   GENERAL: Alert, oriented x 3, not in distress  HEENT: PERRLA; EOMI. Oropharynx clear. NECK: Supple. Without lymphadenopathy. LUNGS: Good air entry bilaterally. No wheezing, crackles or ronchi. CARDIOVASCULAR: Regular rate. No murmurs, rubs or gallops. ABDOMEN: Soft. Non-tender, non-distended. Positive bowel sounds  EXTREMITIES: Without clubbing, cyanosis, or edema. NEUROLOGIC: No focal deficits.    ECOG PS 1    Lab Results   Component Value Date    WBC 6.8 10/04/2021    HGB 11.1 (L) 10/04/2021    HCT 33.8 (L) 10/04/2021    MCV 89.2 10/04/2021     10/04/2021     Lab Results   Component Value Date     10/04/2021    K 2.6 (LL) 10/04/2021     10/04/2021    CO2 26 10/04/2021    BUN 8 10/04/2021    CREATININE 0.5 10/04/2021    GLUCOSE 101 (H) 10/04/2021    CALCIUM 8.7 10/04/2021    PROT 6.7 10/04/2021    LABALBU 3.7 10/04/2021    BILITOT 0.5 10/04/2021    ALKPHOS 123 (H) 10/04/2021    AST 14 10/04/2021    ALT 10 10/04/2021    LABGLOM >60 10/04/2021    GFRAA >60 10/04/2021     Lab Results   Component Value Date    MG 1.3 10/04/2021     Lab Results   Component Value Date    CEA 12.3 (H) 10/04/2021     Impression/Plan:  63 y/o female with clinical stage: [id number] Rectosigmoid Cancer  CT abdomen/pelvis 06/16/2021:  Circumferentially infiltrative mass in the upper rectum, consistent with malignancy  Metastatic lymph nodes in the adjacent mesorectum, as well as the precaval region of the upper abdomen. Multiple prominent liver metastases    CTA chest 06/16/2021 noted no evidence of PE.  7 mm nodule seen within the right lower lobe posteriorly worrisome for metastatic disease. No region of intrathoracic lymphadenopathy is identified    Flex sigmoidoscopy 06/29/2021:  Nearly complete obstructive rectal mass at 15cms from AV  Colon, rectum, biopsy - Colonic mucosa with high-grade dysplasia; see comment. COMMENT   Additional deeper levels were also examined    MRI Rectum on 06/29/2021:  7.7 cm rectosigmoid mass spanning and invading the peritoneal reflection with superior rectal lymphadenopathy. Stage: T4a N2  MRF: Clear (tumor margin >2 mm from MRF)   Sphincter involvement: No.   Suspicious extra mesorectal lymph nodes: No    CEA 25.4 on 07/14/2021    Laparoscopic loop sigmoid colostomy and liver biopsy 07/15/2021  Liver, biopsy - Adenocarcinoma, morphologically compatible with colorectal primary  NGS testing ([facility])   TMB 5Muts/Mb; No therapies or clinical trials  MSI-High Not detected  PTEN: Therapies with clinical benefit in tumor type: None  Therapies with clinical benefit in other tumor type: None    Case discussed with Dr. Justin Ahr [facility]  Systemic chemotherapy consists of FOLFOX + Avastin. Side effects reviewed. She agreed to proceed. Mediport placed  Cycle # 1 FOLFOX + Avastin was on 08/17/2021.    Was in ED 08/30/2021 for hypoK+ and HypoMg but left AMA  Cycle # 2 FOLFOX + Avastin was on 09/07/2021. CEA 16.4 on 09/07/2021. Cycle # 3 FOLFOX + Avastin was on 09/21/2021. CEA 13.8 on 09/20/2021. Cycle # 4 FOLFOX + Avastin is held 10/05/2021 due to hypoK+ and HypoMg; 4gm IV Mg and 40 meq IV Kcl today 10/05/2021. Prescribed Kcl 40 meq po bid as well.    RTC next week for possible Cycle # 4 FOLFOX + Avastin    Orly Doyle MD   10/5/2021

## 2021-10-05 NOTE — TELEPHONE ENCOUNTER
Met with patient in the treatment room prior Kcl and Mg infusion for follow up. Chemotherapy treatment postponed until next week. Patient appears well and is in good spirits. States that she is sorry that she didn't go to the ED yesterday for her low Kcl per Dr. Oral Tapia recommendations, that she felt fine. Informed her that it is very important to go to the hospital when indicated by the physician that it is for her safety and health. Verbalizes understanding and states that she will next time. Upon inquiring, patient states that her rectal pain has improved greatly and she is able to sit without issues. She had to cancel her initial palliative care appointment due to hospital admission the same day. Described palliative care services in detail. Patient wants to wait on rescheduling her appointment with palliative care since she is doing better. Reports eating fair when she doesn't have canker sores in her mouth after treatment and sleeping well. States that she is gargling with baking soda. Discussed Magic mouthwash prescription (Dr. Augustina Nova ordered Armen Webb CNP to place order) to be sent to her pharmacy with her Kcl prescription per Dr. Augustina Nova today and importance of picking it up today. States that she will  the prescriptions today. Provided support and encouragement. Provided written patient education from [facility]:  Mouth Sores due to Chemotherapy. Denies any current needs for assistance from NN. Patient appreciative of visit and information. Will continue to follow as needed. KARLA SenW, RN, OCN  Oncology Nurse Navigator

## 2021-10-08 DIAGNOSIS — C20 RECTAL CANCER (HCC): Primary | ICD-10-CM

## 2021-10-08 DIAGNOSIS — R97.8 ABNORMAL TUMOR MARKERS: ICD-10-CM

## 2021-10-11 ENCOUNTER — HOSPITAL ENCOUNTER (OUTPATIENT)
Dept: INFUSION THERAPY | Age: 61
Discharge: HOME | End: 2021-10-11
Payer: COMMERCIAL

## 2021-10-11 DIAGNOSIS — C20 RECTAL CANCER (HCC): ICD-10-CM

## 2021-10-11 DIAGNOSIS — I87.8 POOR VENOUS ACCESS: ICD-10-CM

## 2021-10-11 DIAGNOSIS — R97.8 ABNORMAL TUMOR MARKERS: Primary | ICD-10-CM

## 2021-10-11 DIAGNOSIS — E78.2 MIXED HYPERLIPIDEMIA: ICD-10-CM

## 2021-10-11 LAB
ALBUMIN SERPL-MCNC: 4.1 G/DL (ref 3.5–5.2)
ALP BLD-CCNC: 148 U/L (ref 35–104)
ALT SERPL-CCNC: 12 U/L (ref 0–32)
ANION GAP SERPL CALCULATED.3IONS-SCNC: 14 MMOL/L (ref 7–16)
AST SERPL-CCNC: 20 U/L (ref 0–31)
BASOPHILS ABSOLUTE: 0.11 E9/L (ref 0–0.2)
BASOPHILS RELATIVE PERCENT: 1.2 % (ref 0–2)
BILIRUB SERPL-MCNC: 0.2 MG/DL (ref 0–1.2)
BUN BLDV-MCNC: 9 MG/DL (ref 6–23)
CALCIUM SERPL-MCNC: 9.6 MG/DL (ref 8.6–10.2)
CEA: 12.3 NG/ML (ref 0–5.2)
CHLORIDE BLD-SCNC: 100 MMOL/L (ref 98–107)
CO2: 23 MMOL/L (ref 22–29)
CREAT SERPL-MCNC: 0.6 MG/DL (ref 0.5–1)
EOSINOPHILS ABSOLUTE: 0.15 E9/L (ref 0.05–0.5)
EOSINOPHILS RELATIVE PERCENT: 1.6 % (ref 0–6)
GFR AFRICAN AMERICAN: >60
GFR NON-AFRICAN AMERICAN: >60 ML/MIN/1.73
GLUCOSE BLD-MCNC: 97 MG/DL (ref 74–99)
HCT VFR BLD CALC: 38.7 % (ref 34–48)
HEMOGLOBIN: 12.7 G/DL (ref 11.5–15.5)
IMMATURE GRANULOCYTES #: 0.21 E9/L
IMMATURE GRANULOCYTES %: 2.2 % (ref 0–5)
LYMPHOCYTES ABSOLUTE: 3.94 E9/L (ref 1.5–4)
LYMPHOCYTES RELATIVE PERCENT: 42 % (ref 20–42)
MAGNESIUM: 1.4 MG/DL (ref 1.6–2.6)
MCH RBC QN AUTO: 29.7 PG (ref 26–35)
MCHC RBC AUTO-ENTMCNC: 32.8 % (ref 32–34.5)
MCV RBC AUTO: 90.4 FL (ref 80–99.9)
MONOCYTES ABSOLUTE: 1.2 E9/L (ref 0.1–0.95)
MONOCYTES RELATIVE PERCENT: 12.8 % (ref 2–12)
NEUTROPHILS ABSOLUTE: 3.78 E9/L (ref 1.8–7.3)
NEUTROPHILS RELATIVE PERCENT: 40.2 % (ref 43–80)
PDW BLD-RTO: 18.9 FL (ref 11.5–15)
PLATELET # BLD: 418 E9/L (ref 130–450)
PMV BLD AUTO: 9.6 FL (ref 7–12)
POTASSIUM SERPL-SCNC: 4.1 MMOL/L (ref 3.5–5)
RBC # BLD: 4.28 E12/L (ref 3.5–5.5)
SODIUM BLD-SCNC: 137 MMOL/L (ref 132–146)
TOTAL PROTEIN: 7.6 G/DL (ref 6.4–8.3)
WBC # BLD: 9.4 E9/L (ref 4.5–11.5)

## 2021-10-11 PROCEDURE — 36591 DRAW BLOOD OFF VENOUS DEVICE: CPT

## 2021-10-11 PROCEDURE — 80053 COMPREHEN METABOLIC PANEL: CPT

## 2021-10-11 PROCEDURE — 83735 ASSAY OF MAGNESIUM: CPT

## 2021-10-11 PROCEDURE — 82378 CARCINOEMBRYONIC ANTIGEN: CPT

## 2021-10-11 PROCEDURE — 2580000003 HC RX 258: Performed by: NURSE PRACTITIONER

## 2021-10-11 PROCEDURE — 85025 COMPLETE CBC W/AUTO DIFF WBC: CPT

## 2021-10-11 PROCEDURE — 6360000002 HC RX W HCPCS: Performed by: NURSE PRACTITIONER

## 2021-10-11 RX ORDER — SODIUM CHLORIDE 0.9 % (FLUSH) 0.9 %
5-40 SYRINGE (ML) INJECTION PRN
Status: CANCELLED | OUTPATIENT
Start: 2021-10-11

## 2021-10-11 RX ORDER — SODIUM CHLORIDE 0.9 % (FLUSH) 0.9 %
5-40 SYRINGE (ML) INJECTION PRN
Status: DISCONTINUED | OUTPATIENT
Start: 2021-10-11 | End: 2021-10-12 | Stop reason: HOSPADM

## 2021-10-11 RX ORDER — ATORVASTATIN CALCIUM 10 MG/1
10 TABLET, FILM COATED ORAL DAILY
Qty: 30 TABLET | Refills: 12 | Status: SHIPPED | OUTPATIENT
Start: 2021-10-11

## 2021-10-11 RX ORDER — SODIUM CHLORIDE 9 MG/ML
25 INJECTION, SOLUTION INTRAVENOUS PRN
Status: CANCELLED | OUTPATIENT
Start: 2021-10-11

## 2021-10-11 RX ORDER — HEPARIN SODIUM (PORCINE) LOCK FLUSH IV SOLN 100 UNIT/ML 100 UNIT/ML
500 SOLUTION INTRAVENOUS PRN
Status: DISCONTINUED | OUTPATIENT
Start: 2021-10-11 | End: 2021-10-12 | Stop reason: HOSPADM

## 2021-10-11 RX ORDER — HEPARIN SODIUM (PORCINE) LOCK FLUSH IV SOLN 100 UNIT/ML 100 UNIT/ML
500 SOLUTION INTRAVENOUS PRN
Status: CANCELLED | OUTPATIENT
Start: 2021-10-11

## 2021-10-11 RX ADMIN — SODIUM CHLORIDE, PRESERVATIVE FREE 10 ML: 5 INJECTION INTRAVENOUS at 14:35

## 2021-10-11 RX ADMIN — Medication 500 UNITS: at 14:35

## 2021-10-11 NOTE — PROGRESS NOTES
Patient presents to clinic for labs today. Right chesy SQ port accessed per policy using 20 G 3.88 inch Iglesias needle for good blood return. Aspirate for waste and specimen sent to lab. Site flushed easily with 10 mL NSS followed by 5 mL Heparin solution 100 units/ml rinse prior to de-access. Dry sterile dressing to area. Tolerated procedure well. Encouraged to schedule port flush every 4 weeks.

## 2021-10-12 ENCOUNTER — HOSPITAL ENCOUNTER (OUTPATIENT)
Dept: INFUSION THERAPY | Age: 61
Discharge: HOME | End: 2021-10-12
Payer: COMMERCIAL

## 2021-10-12 ENCOUNTER — OFFICE VISIT (OUTPATIENT)
Dept: ONCOLOGY | Age: 61
End: 2021-10-12
Payer: COMMERCIAL

## 2021-10-12 VITALS
HEIGHT: 64 IN | BODY MASS INDEX: 22.48 KG/M2 | DIASTOLIC BLOOD PRESSURE: 86 MMHG | SYSTOLIC BLOOD PRESSURE: 139 MMHG | HEART RATE: 81 BPM | OXYGEN SATURATION: 98 % | WEIGHT: 131.7 LBS | TEMPERATURE: 97.3 F

## 2021-10-12 VITALS — DIASTOLIC BLOOD PRESSURE: 72 MMHG | SYSTOLIC BLOOD PRESSURE: 137 MMHG | HEART RATE: 76 BPM | RESPIRATION RATE: 18 BRPM

## 2021-10-12 DIAGNOSIS — C20 RECTAL CANCER (HCC): Primary | ICD-10-CM

## 2021-10-12 PROCEDURE — 96415 CHEMO IV INFUSION ADDL HR: CPT

## 2021-10-12 PROCEDURE — G8427 DOCREV CUR MEDS BY ELIG CLIN: HCPCS | Performed by: INTERNAL MEDICINE

## 2021-10-12 PROCEDURE — 96413 CHEMO IV INFUSION 1 HR: CPT

## 2021-10-12 PROCEDURE — 99214 OFFICE O/P EST MOD 30 MIN: CPT | Performed by: INTERNAL MEDICINE

## 2021-10-12 PROCEDURE — 6360000002 HC RX W HCPCS: Performed by: INTERNAL MEDICINE

## 2021-10-12 PROCEDURE — 3017F COLORECTAL CA SCREEN DOC REV: CPT | Performed by: INTERNAL MEDICINE

## 2021-10-12 PROCEDURE — 96417 CHEMO IV INFUS EACH ADDL SEQ: CPT

## 2021-10-12 PROCEDURE — G8420 CALC BMI NORM PARAMETERS: HCPCS | Performed by: INTERNAL MEDICINE

## 2021-10-12 PROCEDURE — 96375 TX/PRO/DX INJ NEW DRUG ADDON: CPT

## 2021-10-12 PROCEDURE — 96416 CHEMO PROLONG INFUSE W/PUMP: CPT

## 2021-10-12 PROCEDURE — 2580000003 HC RX 258: Performed by: INTERNAL MEDICINE

## 2021-10-12 PROCEDURE — 96411 CHEMO IV PUSH ADDL DRUG: CPT

## 2021-10-12 PROCEDURE — 4004F PT TOBACCO SCREEN RCVD TLK: CPT | Performed by: INTERNAL MEDICINE

## 2021-10-12 PROCEDURE — 96368 THER/DIAG CONCURRENT INF: CPT

## 2021-10-12 PROCEDURE — G8484 FLU IMMUNIZE NO ADMIN: HCPCS | Performed by: INTERNAL MEDICINE

## 2021-10-12 RX ORDER — HEPARIN SODIUM (PORCINE) LOCK FLUSH IV SOLN 100 UNIT/ML 100 UNIT/ML
500 SOLUTION INTRAVENOUS PRN
Status: CANCELLED | OUTPATIENT
Start: 2021-10-12

## 2021-10-12 RX ORDER — SODIUM CHLORIDE 9 MG/ML
INJECTION, SOLUTION INTRAVENOUS ONCE
Status: COMPLETED | OUTPATIENT
Start: 2021-10-12 | End: 2021-10-12

## 2021-10-12 RX ORDER — DEXTROSE MONOHYDRATE 50 MG/ML
INJECTION, SOLUTION INTRAVENOUS ONCE
Status: CANCELLED | OUTPATIENT
Start: 2021-10-12 | End: 2021-10-12

## 2021-10-12 RX ORDER — SODIUM CHLORIDE 0.9 % (FLUSH) 0.9 %
5-40 SYRINGE (ML) INJECTION PRN
Status: CANCELLED | OUTPATIENT
Start: 2021-10-12

## 2021-10-12 RX ORDER — DEXTROSE MONOHYDRATE 50 MG/ML
INJECTION, SOLUTION INTRAVENOUS ONCE
Status: COMPLETED | OUTPATIENT
Start: 2021-10-12 | End: 2021-10-12

## 2021-10-12 RX ORDER — MAGNESIUM SULFATE IN WATER 40 MG/ML
2000 INJECTION, SOLUTION INTRAVENOUS ONCE
Status: COMPLETED | OUTPATIENT
Start: 2021-10-12 | End: 2021-10-12

## 2021-10-12 RX ORDER — PALONOSETRON HYDROCHLORIDE 0.05 MG/ML
0.25 INJECTION, SOLUTION INTRAVENOUS ONCE
Status: COMPLETED | OUTPATIENT
Start: 2021-10-12 | End: 2021-10-12

## 2021-10-12 RX ORDER — EPINEPHRINE 1 MG/ML
0.3 INJECTION, SOLUTION, CONCENTRATE INTRAVENOUS PRN
Status: CANCELLED | OUTPATIENT
Start: 2021-10-12

## 2021-10-12 RX ORDER — DEXAMETHASONE SODIUM PHOSPHATE 10 MG/ML
10 INJECTION INTRAMUSCULAR; INTRAVENOUS ONCE
Status: COMPLETED | OUTPATIENT
Start: 2021-10-12 | End: 2021-10-12

## 2021-10-12 RX ORDER — SODIUM CHLORIDE 9 MG/ML
INJECTION, SOLUTION INTRAVENOUS ONCE
Status: CANCELLED | OUTPATIENT
Start: 2021-10-12 | End: 2021-10-12

## 2021-10-12 RX ORDER — FLUOROURACIL 50 MG/ML
400 INJECTION, SOLUTION INTRAVENOUS ONCE
Status: CANCELLED | OUTPATIENT
Start: 2021-10-12

## 2021-10-12 RX ORDER — SODIUM CHLORIDE 9 MG/ML
INJECTION, SOLUTION INTRAVENOUS ONCE
Status: DISCONTINUED | OUTPATIENT
Start: 2021-10-12 | End: 2021-10-12 | Stop reason: ALTCHOICE

## 2021-10-12 RX ORDER — METHYLPREDNISOLONE SODIUM SUCCINATE 125 MG/2ML
125 INJECTION, POWDER, LYOPHILIZED, FOR SOLUTION INTRAMUSCULAR; INTRAVENOUS ONCE
Status: CANCELLED | OUTPATIENT
Start: 2021-10-12 | End: 2021-10-12

## 2021-10-12 RX ORDER — FLUOROURACIL 50 MG/ML
400 INJECTION, SOLUTION INTRAVENOUS ONCE
Status: COMPLETED | OUTPATIENT
Start: 2021-10-12 | End: 2021-10-12

## 2021-10-12 RX ORDER — DEXAMETHASONE SODIUM PHOSPHATE 10 MG/ML
10 INJECTION, SOLUTION INTRAMUSCULAR; INTRAVENOUS ONCE
Status: CANCELLED | OUTPATIENT
Start: 2021-10-12

## 2021-10-12 RX ORDER — DIPHENHYDRAMINE HYDROCHLORIDE 50 MG/ML
50 INJECTION INTRAMUSCULAR; INTRAVENOUS ONCE
Status: CANCELLED | OUTPATIENT
Start: 2021-10-12 | End: 2021-10-12

## 2021-10-12 RX ORDER — MEPERIDINE HYDROCHLORIDE 25 MG/ML
12.5 INJECTION INTRAMUSCULAR; INTRAVENOUS; SUBCUTANEOUS ONCE
Status: CANCELLED | OUTPATIENT
Start: 2021-10-12 | End: 2021-10-12

## 2021-10-12 RX ORDER — SODIUM CHLORIDE 9 MG/ML
25 INJECTION, SOLUTION INTRAVENOUS PRN
Status: CANCELLED | OUTPATIENT
Start: 2021-10-12

## 2021-10-12 RX ORDER — SODIUM CHLORIDE 9 MG/ML
INJECTION, SOLUTION INTRAVENOUS CONTINUOUS
Status: CANCELLED | OUTPATIENT
Start: 2021-10-12

## 2021-10-12 RX ORDER — PALONOSETRON HYDROCHLORIDE 0.05 MG/ML
0.25 INJECTION, SOLUTION INTRAVENOUS ONCE
Status: CANCELLED | OUTPATIENT
Start: 2021-10-12

## 2021-10-12 RX ADMIN — SODIUM CHLORIDE: 9 INJECTION, SOLUTION INTRAVENOUS at 09:52

## 2021-10-12 RX ADMIN — SODIUM CHLORIDE 300 MG: 900 INJECTION, SOLUTION INTRAVENOUS at 10:33

## 2021-10-12 RX ADMIN — DEXAMETHASONE SODIUM PHOSPHATE 10 MG: 10 INJECTION INTRAMUSCULAR; INTRAVENOUS at 10:12

## 2021-10-12 RX ADMIN — MAGNESIUM SULFATE HEPTAHYDRATE 2000 MG: 40 INJECTION, SOLUTION INTRAVENOUS at 11:24

## 2021-10-12 RX ADMIN — FLUOROURACIL 4150 MG: 50 INJECTION, SOLUTION INTRAVENOUS at 14:50

## 2021-10-12 RX ADMIN — LEUCOVORIN CALCIUM 700 MG: 350 INJECTION, POWDER, LYOPHILIZED, FOR SOLUTION INTRAMUSCULAR; INTRAVENOUS at 11:24

## 2021-10-12 RX ADMIN — PALONOSETRON 0.25 MG: 0.25 INJECTION, SOLUTION INTRAVENOUS at 10:08

## 2021-10-12 RX ADMIN — DEXTROSE MONOHYDRATE: 50 INJECTION, SOLUTION INTRAVENOUS at 11:23

## 2021-10-12 RX ADMIN — DEXTROSE MONOHYDRATE: 50 INJECTION, SOLUTION INTRAVENOUS at 11:17

## 2021-10-12 RX ADMIN — OXALIPLATIN 145 MG: 5 INJECTION, SOLUTION INTRAVENOUS at 11:24

## 2021-10-12 RX ADMIN — FLUOROURACIL 700 MG: 50 INJECTION, SOLUTION INTRAVENOUS at 14:42

## 2021-10-12 NOTE — PROGRESS NOTES
Department of [place] [id number]   Attending Clinic Note    Reason for Visit: Follow-up on a patient with Rectal cancer    PCP:  Rajni Roberts MD    History of Present Illness:  61 y/o female who was complaining of diarrhea blood in stool and painful defecation    CT abdomen/pelvis 06/16/2021:  Circumferentially infiltrative mass in the upper rectum, consistent with malignancy  Metastatic lymph nodes in the adjacent mesorectum, as well as the precaval region of the upper abdomen. Multiple prominent liver metastases    CTA chest 06/16/2021 noted no evidence of PE.  7 mm nodule seen within the right lower lobe posteriorly worrisome for metastatic disease. No region of intrathoracic lymphadenopathy is identified    Flex sigmoidoscopy 06/29/2021:  Nearly complete obstructive rectal mass at 15cms from AV  Colon, rectum, biopsy - Colonic mucosa with high-grade dysplasia; see comment. COMMENT   Additional deeper levels were also examined    MRI Rectum on 06/29/2021:  7.7 cm rectosigmoid mass spanning and invading the peritoneal reflection with superior rectal lymphadenopathy. Stage: T4a N2  MRF: Clear (tumor margin >2 mm from MRF)   Sphincter involvement: No.   Suspicious extra mesorectal lymph nodes: No    CEA 25.4 on 07/14/2021    Laparoscopic loop sigmoid colostomy and liver biopsy 07/15/2021  Liver, biopsy - Adenocarcinoma, morphologically compatible with colorectal primary  NGS testing ([address] [facility]) ordered and pending    Case discussed with Dr. Yana Bonilla [facility]  Systemic chemotherapy consists of FOLFOX + Avastin. Side effects reviewed. She agreed to proceed. Mediport placed  Cycle # 1 FOLFOX + Avastin was on 08/17/2021  Was in ED 08/30/2021 for hypoK+ and HypoMg but left AMA  Cycle # 2 FOLFOX + Avastin was on 09/07/2021. Cycle # 3 FOLFOX + Avastin was on 09/21/2021. Today 10/12/2021. No fever, chills. No N/V. Fair appetite and energy level. Review of Systems;  CONSTITUTIONAL: No fever, chills.  Min Graves appetite and energy level. ENMT: Eyes: No diplopia; Nose: No epistaxis. Mouth: No sore throat. RESPIRATORY: No hemoptysis, shortness of breath, cough. CARDIOVASCULAR: No chest pain, palpitations. GASTROINTESTINAL: No N.V, abdominal pain  GENITOURINARY: No dysuria, urinary frequency, hematuria. NEURO: No syncope, presyncope, headache. Remainder:ROS NEGATIVE    Past Medical History:      Diagnosis Date    Al[facility]olic ([facility]     Anxiety     Cancer ([facility]     Chronic right shoulder pain     [facility] 09/16/2011    Depression     Depression 12/14/2020    GERD (gastroesophageal reflux disease)     Headache     HTN (hypertension) 06/16/2015    Mixed hyperlipidemia 02/17/2016    Osteoarthritis     Psychiatric problem     Substance abuse (HCC)     methadone     Medications:  Reviewed and reconciled. Allergies:  No Known Allergies    Physical Exam:  /86   Pulse 81   Temp 97.3 °F (36.3 °C)   Ht 5' 3.5\" (1.613 m)   Wt 131 lb 11.2 oz (59.7 kg)   LMP 09/08/2011   SpO2 98%   BMI 22.96 kg/m²   GENERAL: Alert, oriented x 3, not in distress  HEENT: PERRLA; EOMI. Oropharynx clear. NECK: Supple. Without lymphadenopathy. LUNGS: Good air entry bilaterally. No wheezing, crackles or ronchi. CARDIOVASCULAR: Regular rate. No murmurs, rubs or gallops. ABDOMEN: Soft. Non-tender, non-distended. Positive bowel sounds  EXTREMITIES: Without clubbing, cyanosis, or edema. NEUROLOGIC: No focal deficits.    ECOG PS 1    Lab Results   Component Value Date    WBC 9.4 10/11/2021    HGB 12.7 10/11/2021    HCT 38.7 10/11/2021    MCV 90.4 10/11/2021     10/11/2021     Lab Results   Component Value Date     10/11/2021    K 4.1 10/11/2021     10/11/2021    CO2 23 10/11/2021    BUN 9 10/11/2021    CREATININE 0.6 10/11/2021    GLUCOSE 97 10/11/2021    CALCIUM 9.6 10/11/2021    PROT 7.6 10/11/2021    LABALBU 4.1 10/11/2021    BILITOT 0.2 10/11/2021    ALKPHOS 148 (H) 10/11/2021    AST 20 10/11/2021    ALT 12 10/11/2021    LABGLOM >60 10/11/2021    GFRAA >60 10/11/2021     Lab Results   Component Value Date    MG 1.4 10/11/2021     Lab Results   Component Value Date    CEA 12.3 (H) 10/11/2021     Impression/Plan:  61 y/o female with clinical stage: [id number] Rectosigmoid Cancer  CT abdomen/pelvis 06/16/2021:  Circumferentially infiltrative mass in the upper rectum, consistent with malignancy  Metastatic lymph nodes in the adjacent mesorectum, as well as the precaval region of the upper abdomen. Multiple prominent liver metastases    CTA chest 06/16/2021 noted no evidence of PE.  7 mm nodule seen within the right lower lobe posteriorly worrisome for metastatic disease. No region of intrathoracic lymphadenopathy is identified    Flex sigmoidoscopy 06/29/2021:  Nearly complete obstructive rectal mass at 15cms from AV  Colon, rectum, biopsy - Colonic mucosa with high-grade dysplasia; see comment. COMMENT   Additional deeper levels were also examined    MRI Rectum on 06/29/2021:  7.7 cm rectosigmoid mass spanning and invading the peritoneal reflection with superior rectal lymphadenopathy. Stage: T4a N2  MRF: Clear (tumor margin >2 mm from MRF)   Sphincter involvement: No.   Suspicious extra mesorectal lymph nodes: No    CEA 25.4 on 07/14/2021    Laparoscopic loop sigmoid colostomy and liver biopsy 07/15/2021  Liver, biopsy - Adenocarcinoma, morphologically compatible with colorectal primary  NGS testing ([employer])   TMB 5Muts/Mb; No therapies or clinical trials  MSI-High Not detected  PTEN: Therapies with clinical benefit in tumor type: None  Therapies with clinical benefit in other tumor type: None    Case discussed with Dr. Malik Neff [facility]  Systemic chemotherapy consists of FOLFOX + Avastin. Side effects reviewed. She agreed to proceed. Mediport placed  Cycle # 1 FOLFOX + Avastin was on 08/17/2021.    Was in ED 08/30/2021 for hypoK+ and HypoMg but left AMA  Cycle # 2 FOLFOX + Avastin was on 09/07/2021. CEA 16.4 on 09/07/2021. Cycle # 3 FOLFOX + Avastin was on 09/21/2021. CEA 13.8 on 09/20/2021. Cycle # 4 FOLFOX + Avastin is today 10/12/2021. CEA 12.3 on 10/11/2021. HypoMg to be replaced.      RTC 2 weeks for Cycle # 5 FOLFOX + Avastin    Orly Doyle MD   10/12/2021

## 2021-10-13 NOTE — PROGRESS NOTES
Fredo Walter  10/13/2021  Wt Readings from Last 10 Encounters:   10/12/21 131 lb 11.2 oz (59.7 kg)   10/05/21 131 lb 3.2 oz (59.5 kg)   09/21/21 136 lb 6.4 oz (61.9 kg)   09/15/21 134 lb (60.8 kg)   09/07/21 139 lb 1.6 oz (63.1 kg)   08/31/21 149 lb 6.4 oz (67.8 kg)   08/30/21 144 lb (65.3 kg)   08/17/21 144 lb 11.2 oz (65.6 kg)   08/11/21 143 lb (64.9 kg)   08/06/21 144 lb 14.4 oz (65.7 kg)     Ht Readings from Last 1 Encounters:   10/12/21 5' 3.5\" (1.613 m)     Met with pt today for follow up. Pt reported decline in appetite/intake overall but improved appetite this week. She stated that following chemo day she has nausea and diarrhea for several days then feels significantly improved after this. She does take anti-emetics PRN and noted these do help but is not taking Imodium. She commented that she changes her colostomy bag about 5 times daily and her stool usually starts more firm in the morning but progressively becomes more watery throughout the day. She is receiving Ensure Enlive via [employer] and drinks these BID when she is not feeling well but will consume only 3 meals daily when she is feeling better. Discussed maximizing nutrition on good days with incorporating BID Ensure or snacks. Additionally encouraged soluble fiber foods to help bulk stool if she does not wish to take medications. Provided pt with examples of soluble fiber foods such as bananas, oatmeal, and discussed possibly trying Benefiber/Metamucil. Also discussed repletion of electrolytes with ongoing diarrhea. Pt noted that she enjoys Pedialyte and will start drinking this. Encouraged at least 8 oz of Pedialyte for every bag change with watery stools. Lastly encouraged good hydration with primarily water in addition to the [address] Pt was receptive of information provided, all questions were answered, and will continue to follow. Weight change: 5% wt loss in 1 month, likely compounded by fluid losses w/ ongoing diarrhea.    Appetite: fair  N/V/D/C: diarrhea, intermittent nausea  Calculated Needs if applicable: 4289-4803 kcal (30-32 kcal/kg), 80-90 gm protein (1.3-1.5 gm/kg), 1950 ml FW (32 ml/kg)     Recommendations: Pt is to consume at least 3 meals and 2 Ensure Enlive (700 kcal and 40 gm protein) or 2 snacks daily incorporating high soluble fiber foods. She is also to drink at least 64 oz of fluid daily w/ an additional 8 oz of electrolyte beverage for every bag change with watery stool. [employer] GUIDELINES FOR CLINICAL CHARACTERISTICS OF MALNUTRITION IN CHRONIC ILLNESS     Moderate Malnutrition  Severe Malnutrition    Energy intake  <75% energy intake compared to estimated needs for >1month <75% energy intake compared to estimated needs for >1month   Weight changes  5% x 1 month  7.5% x 3 months   10% x 6 months   20% x 1 year  >5% x 1 month  >7.5% x 3 months   >10% x 6 months   >20% x 1 year    Physical findings  Mild   Decrease subcutaneous fat    Decrease muscle mass     Increase fluid/edema   Severe  Decrease subcutaneous fat    Decrease muscle mass     Increase fluid/edema    Pt is at risk for malnutrition AEB 5% wt loss in 1 month and recent decreased intake, however wt loss likely compounded by fluid losses r/t diarrhea.      Jl Morrissey, LINDA, LD

## 2021-10-14 ENCOUNTER — HOSPITAL ENCOUNTER (OUTPATIENT)
Dept: INFUSION THERAPY | Age: 61
Discharge: HOME | End: 2021-10-14
Payer: COMMERCIAL

## 2021-10-14 DIAGNOSIS — I87.8 POOR VENOUS ACCESS: Primary | ICD-10-CM

## 2021-10-14 DIAGNOSIS — C20 RECTAL CANCER (HCC): ICD-10-CM

## 2021-10-14 PROCEDURE — 99214 OFFICE O/P EST MOD 30 MIN: CPT

## 2021-10-14 PROCEDURE — 2580000003 HC RX 258: Performed by: NURSE PRACTITIONER

## 2021-10-14 PROCEDURE — 6360000002 HC RX W HCPCS

## 2021-10-14 RX ORDER — SODIUM CHLORIDE 0.9 % (FLUSH) 0.9 %
5-40 SYRINGE (ML) INJECTION PRN
Status: CANCELLED | OUTPATIENT
Start: 2021-10-14

## 2021-10-14 RX ORDER — HEPARIN SODIUM (PORCINE) LOCK FLUSH IV SOLN 100 UNIT/ML 100 UNIT/ML
500 SOLUTION INTRAVENOUS PRN
Status: CANCELLED | OUTPATIENT
Start: 2021-10-14

## 2021-10-14 RX ORDER — SODIUM CHLORIDE 0.9 % (FLUSH) 0.9 %
5-40 SYRINGE (ML) INJECTION PRN
Status: DISCONTINUED | OUTPATIENT
Start: 2021-10-14 | End: 2021-10-15 | Stop reason: HOSPADM

## 2021-10-14 RX ORDER — HEPARIN SODIUM (PORCINE) LOCK FLUSH IV SOLN 100 UNIT/ML 100 UNIT/ML
SOLUTION INTRAVENOUS
Status: COMPLETED
Start: 2021-10-14 | End: 2021-10-14

## 2021-10-14 RX ORDER — HEPARIN SODIUM (PORCINE) LOCK FLUSH IV SOLN 100 UNIT/ML 100 UNIT/ML
500 SOLUTION INTRAVENOUS PRN
Status: DISCONTINUED | OUTPATIENT
Start: 2021-10-14 | End: 2021-10-15 | Stop reason: HOSPADM

## 2021-10-14 RX ORDER — SODIUM CHLORIDE 9 MG/ML
25 INJECTION, SOLUTION INTRAVENOUS PRN
Status: CANCELLED | OUTPATIENT
Start: 2021-10-14

## 2021-10-14 RX ADMIN — HEPARIN SODIUM (PORCINE) LOCK FLUSH IV SOLN 100 UNIT/ML 500 UNITS: 100 SOLUTION at 13:48

## 2021-10-14 RX ADMIN — SODIUM CHLORIDE, PRESERVATIVE FREE 10 ML: 5 INJECTION INTRAVENOUS at 13:48

## 2021-10-14 RX ADMIN — Medication 500 UNITS: at 13:48

## 2021-10-14 NOTE — PROGRESS NOTES
Presents to clinic for CADD pump removal. Port site appears normal. Denies problems/concerns. Received 252.2 ml of 5-FU & reservoir 7.8 of 5-FU. Port flushed with 10 ml. NSS followed by 5 ml. Heparin Rinse prior to de access. DSD to area. Tolerated well. Encouraged to call clinic with questions/concerns.

## 2021-10-19 DIAGNOSIS — C18.9 ADENOCARCINOMA OF COLON (HCC): ICD-10-CM

## 2021-10-19 RX ORDER — OXYCODONE HYDROCHLORIDE 10 MG/1
10 TABLET ORAL EVERY 6 HOURS PRN
Qty: 120 TABLET | Refills: 0 | Status: SHIPPED
Start: 2021-10-19 | End: 2021-11-19 | Stop reason: SDUPTHER

## 2021-10-19 NOTE — PROGRESS NOTES
Controlled Substance Monitoring:    Acute and Chronic Pain Monitoring:   RX Monitoring 10/19/2021   Acute Pain Prescriptions -   Periodic Controlled Substance Monitoring Possible medication side effects, risk of tolerance/dependence & alternative treatments discussed. ;No signs of potential drug abuse or diversion identified. ;Assessed functional status. ;Obtaining appropriate analgesic effect of treatment.    Chronic Pain > 50 MEDD -

## 2021-10-22 DIAGNOSIS — C20 RECTAL CANCER (HCC): Primary | ICD-10-CM

## 2021-10-25 ENCOUNTER — HOSPITAL ENCOUNTER (OUTPATIENT)
Dept: INFUSION THERAPY | Age: 61
Discharge: HOME | End: 2021-10-25
Payer: COMMERCIAL

## 2021-10-25 DIAGNOSIS — C20 RECTAL CANCER (HCC): Primary | ICD-10-CM

## 2021-10-25 DIAGNOSIS — R35.0 FREQUENCY OF URINATION: Primary | ICD-10-CM

## 2021-10-25 DIAGNOSIS — R35.0 FREQUENCY OF URINATION: ICD-10-CM

## 2021-10-25 DIAGNOSIS — I87.8 POOR VENOUS ACCESS: ICD-10-CM

## 2021-10-25 LAB
ALBUMIN SERPL-MCNC: 3.4 G/DL (ref 3.5–5.2)
ALP BLD-CCNC: 139 U/L (ref 35–104)
ALT SERPL-CCNC: 9 U/L (ref 0–32)
AMORPHOUS: ABNORMAL
ANION GAP SERPL CALCULATED.3IONS-SCNC: 12 MMOL/L (ref 7–16)
AST SERPL-CCNC: 16 U/L (ref 0–31)
BACTERIA: ABNORMAL /HPF
BASOPHILS ABSOLUTE: 0.02 E9/L (ref 0–0.2)
BASOPHILS RELATIVE PERCENT: 0.3 % (ref 0–2)
BILIRUB SERPL-MCNC: 0.3 MG/DL (ref 0–1.2)
BILIRUBIN URINE: NEGATIVE
BLOOD, URINE: NEGATIVE
BUN BLDV-MCNC: 5 MG/DL (ref 6–23)
CALCIUM SERPL-MCNC: 8.4 MG/DL (ref 8.6–10.2)
CEA: 11.7 NG/ML (ref 0–5.2)
CHLORIDE BLD-SCNC: 97 MMOL/L (ref 98–107)
CLARITY: CLEAR
CO2: 25 MMOL/L (ref 22–29)
COLOR: YELLOW
CREAT SERPL-MCNC: 0.6 MG/DL (ref 0.5–1)
EOSINOPHILS ABSOLUTE: 0.1 E9/L (ref 0.05–0.5)
EOSINOPHILS RELATIVE PERCENT: 1.3 % (ref 0–6)
EPITHELIAL CELLS, UA: ABNORMAL /HPF
GFR AFRICAN AMERICAN: >60
GFR NON-AFRICAN AMERICAN: >60 ML/MIN/1.73
GLUCOSE BLD-MCNC: 126 MG/DL (ref 74–99)
GLUCOSE URINE: NEGATIVE MG/DL
HCT VFR BLD CALC: 28.9 % (ref 34–48)
HEMOGLOBIN: 9.4 G/DL (ref 11.5–15.5)
IMMATURE GRANULOCYTES #: 0.02 E9/L
IMMATURE GRANULOCYTES %: 0.3 % (ref 0–5)
KETONES, URINE: NEGATIVE MG/DL
LEUKOCYTE ESTERASE, URINE: NEGATIVE
LYMPHOCYTES ABSOLUTE: 2.18 E9/L (ref 1.5–4)
LYMPHOCYTES RELATIVE PERCENT: 28.8 % (ref 20–42)
MAGNESIUM: 1.4 MG/DL (ref 1.6–2.6)
MCH RBC QN AUTO: 30.3 PG (ref 26–35)
MCHC RBC AUTO-ENTMCNC: 32.5 % (ref 32–34.5)
MCV RBC AUTO: 93.2 FL (ref 80–99.9)
MONOCYTES ABSOLUTE: 0.98 E9/L (ref 0.1–0.95)
MONOCYTES RELATIVE PERCENT: 13 % (ref 2–12)
NEUTROPHILS ABSOLUTE: 4.26 E9/L (ref 1.8–7.3)
NEUTROPHILS RELATIVE PERCENT: 56.3 % (ref 43–80)
NITRITE, URINE: NEGATIVE
PDW BLD-RTO: 19 FL (ref 11.5–15)
PH UA: 6 (ref 5–9)
PLATELET # BLD: 163 E9/L (ref 130–450)
PMV BLD AUTO: 10 FL (ref 7–12)
POTASSIUM SERPL-SCNC: 3.4 MMOL/L (ref 3.5–5)
PROTEIN UA: NORMAL MG/DL
RBC # BLD: 3.1 E12/L (ref 3.5–5.5)
RBC UA: ABNORMAL /HPF (ref 0–2)
SODIUM BLD-SCNC: 134 MMOL/L (ref 132–146)
SPECIFIC GRAVITY UA: >=1.03 (ref 1–1.03)
TOTAL PROTEIN: 6.6 G/DL (ref 6.4–8.3)
UROBILINOGEN, URINE: 0.2 E.U./DL
WBC # BLD: 7.6 E9/L (ref 4.5–11.5)
WBC UA: ABNORMAL /HPF (ref 0–5)

## 2021-10-25 PROCEDURE — 85025 COMPLETE CBC W/AUTO DIFF WBC: CPT

## 2021-10-25 PROCEDURE — 83735 ASSAY OF MAGNESIUM: CPT

## 2021-10-25 PROCEDURE — 87088 URINE BACTERIA CULTURE: CPT

## 2021-10-25 PROCEDURE — 36415 COLL VENOUS BLD VENIPUNCTURE: CPT

## 2021-10-25 PROCEDURE — 81001 URINALYSIS AUTO W/SCOPE: CPT

## 2021-10-25 PROCEDURE — 80053 COMPREHEN METABOLIC PANEL: CPT

## 2021-10-25 PROCEDURE — 96523 IRRIG DRUG DELIVERY DEVICE: CPT

## 2021-10-25 PROCEDURE — 82378 CARCINOEMBRYONIC ANTIGEN: CPT

## 2021-10-25 RX ORDER — HEPARIN SODIUM (PORCINE) LOCK FLUSH IV SOLN 100 UNIT/ML 100 UNIT/ML
500 SOLUTION INTRAVENOUS PRN
Status: DISCONTINUED | OUTPATIENT
Start: 2021-10-25 | End: 2021-10-26 | Stop reason: HOSPADM

## 2021-10-25 RX ORDER — HEPARIN SODIUM (PORCINE) LOCK FLUSH IV SOLN 100 UNIT/ML 100 UNIT/ML
500 SOLUTION INTRAVENOUS PRN
Status: CANCELLED | OUTPATIENT
Start: 2021-10-25

## 2021-10-25 RX ORDER — SODIUM CHLORIDE 0.9 % (FLUSH) 0.9 %
5-40 SYRINGE (ML) INJECTION PRN
Status: DISCONTINUED | OUTPATIENT
Start: 2021-10-25 | End: 2021-10-26 | Stop reason: HOSPADM

## 2021-10-25 RX ORDER — SODIUM CHLORIDE 0.9 % (FLUSH) 0.9 %
5-40 SYRINGE (ML) INJECTION PRN
Status: CANCELLED | OUTPATIENT
Start: 2021-10-25

## 2021-10-25 RX ORDER — SODIUM CHLORIDE 9 MG/ML
25 INJECTION, SOLUTION INTRAVENOUS PRN
Status: CANCELLED | OUTPATIENT
Start: 2021-10-25

## 2021-10-25 NOTE — PROGRESS NOTES
PORT FLUSH    Patient presents to clinic for [facility] today. single  SQ port accessed per policy using 75C, . 75 inch Iglesias needle for no blood return. Site flushed easily with 10 mL NSS followed by 5 mL Heparin solution 100 units/ml rinse prior to de-access. Dry sterile dressing to area. Tolerated procedure well. Encouraged to schedule port flush every 4 weeks.

## 2021-10-26 ENCOUNTER — HOSPITAL ENCOUNTER (OUTPATIENT)
Dept: INFUSION THERAPY | Age: 61
Discharge: HOME | End: 2021-10-26
Payer: COMMERCIAL

## 2021-10-26 ENCOUNTER — OFFICE VISIT (OUTPATIENT)
Dept: ONCOLOGY | Age: 61
End: 2021-10-26
Payer: COMMERCIAL

## 2021-10-26 VITALS
TEMPERATURE: 97.5 F | HEART RATE: 104 BPM | DIASTOLIC BLOOD PRESSURE: 70 MMHG | SYSTOLIC BLOOD PRESSURE: 132 MMHG | HEIGHT: 64 IN | WEIGHT: 139.5 LBS | BODY MASS INDEX: 23.82 KG/M2

## 2021-10-26 VITALS — HEART RATE: 79 BPM | RESPIRATION RATE: 18 BRPM | DIASTOLIC BLOOD PRESSURE: 58 MMHG | SYSTOLIC BLOOD PRESSURE: 118 MMHG

## 2021-10-26 DIAGNOSIS — C20 RECTAL CANCER (HCC): Primary | ICD-10-CM

## 2021-10-26 DIAGNOSIS — C20 RECTAL CANCER (HCC): ICD-10-CM

## 2021-10-26 DIAGNOSIS — I87.8 POOR VENOUS ACCESS: Primary | ICD-10-CM

## 2021-10-26 PROCEDURE — 3017F COLORECTAL CA SCREEN DOC REV: CPT | Performed by: INTERNAL MEDICINE

## 2021-10-26 PROCEDURE — 2580000003 HC RX 258: Performed by: NURSE PRACTITIONER

## 2021-10-26 PROCEDURE — 96416 CHEMO PROLONG INFUSE W/PUMP: CPT

## 2021-10-26 PROCEDURE — 96417 CHEMO IV INFUS EACH ADDL SEQ: CPT

## 2021-10-26 PROCEDURE — 96368 THER/DIAG CONCURRENT INF: CPT

## 2021-10-26 PROCEDURE — G8484 FLU IMMUNIZE NO ADMIN: HCPCS | Performed by: INTERNAL MEDICINE

## 2021-10-26 PROCEDURE — 99214 OFFICE O/P EST MOD 30 MIN: CPT | Performed by: INTERNAL MEDICINE

## 2021-10-26 PROCEDURE — 2580000003 HC RX 258: Performed by: INTERNAL MEDICINE

## 2021-10-26 PROCEDURE — G8420 CALC BMI NORM PARAMETERS: HCPCS | Performed by: INTERNAL MEDICINE

## 2021-10-26 PROCEDURE — 36593 DECLOT VASCULAR DEVICE: CPT

## 2021-10-26 PROCEDURE — 4004F PT TOBACCO SCREEN RCVD TLK: CPT | Performed by: INTERNAL MEDICINE

## 2021-10-26 PROCEDURE — G8427 DOCREV CUR MEDS BY ELIG CLIN: HCPCS | Performed by: INTERNAL MEDICINE

## 2021-10-26 PROCEDURE — 96411 CHEMO IV PUSH ADDL DRUG: CPT

## 2021-10-26 PROCEDURE — 96413 CHEMO IV INFUSION 1 HR: CPT

## 2021-10-26 PROCEDURE — 96375 TX/PRO/DX INJ NEW DRUG ADDON: CPT

## 2021-10-26 PROCEDURE — 6360000002 HC RX W HCPCS: Performed by: NURSE PRACTITIONER

## 2021-10-26 PROCEDURE — 6360000002 HC RX W HCPCS: Performed by: INTERNAL MEDICINE

## 2021-10-26 PROCEDURE — 96415 CHEMO IV INFUSION ADDL HR: CPT

## 2021-10-26 RX ORDER — FLUOROURACIL 50 MG/ML
400 INJECTION, SOLUTION INTRAVENOUS ONCE
Status: CANCELLED | OUTPATIENT
Start: 2021-10-26

## 2021-10-26 RX ORDER — POTASSIUM CHLORIDE 20 MEQ/1
40 TABLET, EXTENDED RELEASE ORAL DAILY
Qty: 6 TABLET | Refills: 0 | Status: SHIPPED | OUTPATIENT
Start: 2021-10-26 | End: 2021-10-29

## 2021-10-26 RX ORDER — EPINEPHRINE 1 MG/ML
0.3 INJECTION, SOLUTION, CONCENTRATE INTRAVENOUS PRN
Status: CANCELLED | OUTPATIENT
Start: 2021-10-26

## 2021-10-26 RX ORDER — MAGNESIUM SULFATE IN WATER 40 MG/ML
2000 INJECTION, SOLUTION INTRAVENOUS ONCE
Status: CANCELLED
Start: 2021-10-26 | End: 2021-10-26

## 2021-10-26 RX ORDER — SODIUM CHLORIDE 9 MG/ML
25 INJECTION, SOLUTION INTRAVENOUS PRN
Status: CANCELLED | OUTPATIENT
Start: 2021-10-26

## 2021-10-26 RX ORDER — PALONOSETRON HYDROCHLORIDE 0.05 MG/ML
0.25 INJECTION, SOLUTION INTRAVENOUS ONCE
Status: COMPLETED | OUTPATIENT
Start: 2021-10-26 | End: 2021-10-26

## 2021-10-26 RX ORDER — SODIUM CHLORIDE 9 MG/ML
INJECTION, SOLUTION INTRAVENOUS CONTINUOUS
Status: CANCELLED | OUTPATIENT
Start: 2021-10-26

## 2021-10-26 RX ORDER — SODIUM CHLORIDE 9 MG/ML
INJECTION, SOLUTION INTRAVENOUS ONCE
Status: CANCELLED | OUTPATIENT
Start: 2021-10-26 | End: 2021-10-26

## 2021-10-26 RX ORDER — DEXAMETHASONE SODIUM PHOSPHATE 10 MG/ML
10 INJECTION, SOLUTION INTRAMUSCULAR; INTRAVENOUS ONCE
Status: CANCELLED | OUTPATIENT
Start: 2021-10-26

## 2021-10-26 RX ORDER — DIPHENHYDRAMINE HYDROCHLORIDE 50 MG/ML
50 INJECTION INTRAMUSCULAR; INTRAVENOUS ONCE
Status: CANCELLED | OUTPATIENT
Start: 2021-10-26 | End: 2021-10-26

## 2021-10-26 RX ORDER — HEPARIN SODIUM (PORCINE) LOCK FLUSH IV SOLN 100 UNIT/ML 100 UNIT/ML
500 SOLUTION INTRAVENOUS PRN
Status: CANCELLED | OUTPATIENT
Start: 2021-10-26

## 2021-10-26 RX ORDER — MAGNESIUM SULFATE IN WATER 40 MG/ML
2000 INJECTION, SOLUTION INTRAVENOUS ONCE
Status: COMPLETED | OUTPATIENT
Start: 2021-10-26 | End: 2021-10-26

## 2021-10-26 RX ORDER — DEXAMETHASONE SODIUM PHOSPHATE 10 MG/ML
10 INJECTION INTRAMUSCULAR; INTRAVENOUS ONCE
Status: COMPLETED | OUTPATIENT
Start: 2021-10-26 | End: 2021-10-26

## 2021-10-26 RX ORDER — MEPERIDINE HYDROCHLORIDE 25 MG/ML
12.5 INJECTION INTRAMUSCULAR; INTRAVENOUS; SUBCUTANEOUS ONCE
Status: CANCELLED | OUTPATIENT
Start: 2021-10-26 | End: 2021-10-26

## 2021-10-26 RX ORDER — DEXTROSE MONOHYDRATE 50 MG/ML
250 INJECTION, SOLUTION INTRAVENOUS ONCE
Status: COMPLETED | OUTPATIENT
Start: 2021-10-26 | End: 2021-10-26

## 2021-10-26 RX ORDER — SODIUM CHLORIDE 9 MG/ML
INJECTION, SOLUTION INTRAVENOUS ONCE
Status: COMPLETED | OUTPATIENT
Start: 2021-10-26 | End: 2021-10-26

## 2021-10-26 RX ORDER — SODIUM CHLORIDE 0.9 % (FLUSH) 0.9 %
5-40 SYRINGE (ML) INJECTION PRN
Status: CANCELLED | OUTPATIENT
Start: 2021-10-26

## 2021-10-26 RX ORDER — METHYLPREDNISOLONE SODIUM SUCCINATE 125 MG/2ML
125 INJECTION, POWDER, LYOPHILIZED, FOR SOLUTION INTRAMUSCULAR; INTRAVENOUS ONCE
Status: CANCELLED | OUTPATIENT
Start: 2021-10-26 | End: 2021-10-26

## 2021-10-26 RX ORDER — SODIUM CHLORIDE 9 MG/ML
INJECTION, SOLUTION INTRAVENOUS CONTINUOUS
Status: DISCONTINUED | OUTPATIENT
Start: 2021-10-26 | End: 2021-10-27 | Stop reason: HOSPADM

## 2021-10-26 RX ORDER — PALONOSETRON HYDROCHLORIDE 0.05 MG/ML
0.25 INJECTION, SOLUTION INTRAVENOUS ONCE
Status: CANCELLED | OUTPATIENT
Start: 2021-10-26

## 2021-10-26 RX ORDER — SODIUM CHLORIDE 9 MG/ML
INJECTION, SOLUTION INTRAVENOUS CONTINUOUS
Status: CANCELLED
Start: 2021-10-26

## 2021-10-26 RX ORDER — DEXTROSE MONOHYDRATE 50 MG/ML
INJECTION, SOLUTION INTRAVENOUS ONCE
Status: CANCELLED | OUTPATIENT
Start: 2021-10-26 | End: 2021-10-26

## 2021-10-26 RX ORDER — FLUOROURACIL 50 MG/ML
400 INJECTION, SOLUTION INTRAVENOUS ONCE
Status: COMPLETED | OUTPATIENT
Start: 2021-10-26 | End: 2021-10-26

## 2021-10-26 RX ADMIN — SODIUM CHLORIDE 300 MG: 900 INJECTION, SOLUTION INTRAVENOUS at 10:49

## 2021-10-26 RX ADMIN — SODIUM CHLORIDE: 9 INJECTION, SOLUTION INTRAVENOUS at 10:28

## 2021-10-26 RX ADMIN — FLUOROURACIL 700 MG: 50 INJECTION, SOLUTION INTRAVENOUS at 13:52

## 2021-10-26 RX ADMIN — PALONOSETRON 0.25 MG: 0.25 INJECTION, SOLUTION INTRAVENOUS at 10:33

## 2021-10-26 RX ADMIN — ALTEPLASE 2 MG: 2.2 INJECTION, POWDER, LYOPHILIZED, FOR SOLUTION INTRAVENOUS at 09:57

## 2021-10-26 RX ADMIN — LEUCOVORIN CALCIUM 700 MG: 350 INJECTION, POWDER, LYOPHILIZED, FOR SOLUTION INTRAMUSCULAR; INTRAVENOUS at 11:40

## 2021-10-26 RX ADMIN — DEXTROSE MONOHYDRATE 250 ML: 50 INJECTION, SOLUTION INTRAVENOUS at 11:36

## 2021-10-26 RX ADMIN — WATER 2.2 ML: 1 INJECTION INTRAMUSCULAR; INTRAVENOUS; SUBCUTANEOUS at 09:57

## 2021-10-26 RX ADMIN — OXALIPLATIN 145 MG: 5 INJECTION, SOLUTION INTRAVENOUS at 11:40

## 2021-10-26 RX ADMIN — DEXAMETHASONE SODIUM PHOSPHATE 10 MG: 10 INJECTION INTRAMUSCULAR; INTRAVENOUS at 10:34

## 2021-10-26 RX ADMIN — MAGNESIUM SULFATE HEPTAHYDRATE 2000 MG: 2 INJECTION, SOLUTION INTRAVENOUS at 11:40

## 2021-10-26 NOTE — PROGRESS NOTES
Department of [place] [address]   Attending Clinic Note    Reason for Visit: Follow-up on a patient with Rectal cancer    PCP:  Lima Morris MD    History of Present Illness:  61 y/o female who was complaining of diarrhea blood in stool and painful defecation    CT abdomen/pelvis 06/16/2021:  Circumferentially infiltrative mass in the upper rectum, consistent with malignancy  Metastatic lymph nodes in the adjacent mesorectum, as well as the precaval region of the upper abdomen. Multiple prominent liver metastases    CTA chest 06/16/2021 noted no evidence of PE.  7 mm nodule seen within the right lower lobe posteriorly worrisome for metastatic disease. No region of intrathoracic lymphadenopathy is identified    Flex sigmoidoscopy 06/29/2021:  Nearly complete obstructive rectal mass at 15cms from AV  Colon, rectum, biopsy - Colonic mucosa with high-grade dysplasia; see comment. COMMENT   Additional deeper levels were also examined    MRI Rectum on 06/29/2021:  7.7 cm rectosigmoid mass spanning and invading the peritoneal reflection with superior rectal lymphadenopathy. Stage: T4a N2  MRF: Clear (tumor margin >2 mm from MRF)   Sphincter involvement: No.   Suspicious extra mesorectal lymph nodes: No    CEA 25.4 on 07/14/2021    Laparoscopic loop sigmoid colostomy and liver biopsy 07/15/2021  Liver, biopsy - Adenocarcinoma, morphologically compatible with colorectal primary  NGS testing ([address] [facility]) ordered and pending    Case discussed with Dr. Malik Neff [facility]  Systemic chemotherapy consists of FOLFOX + Avastin. Side effects reviewed. She agreed to proceed. Mediport placed  Cycle # 1 FOLFOX + Avastin was on 08/17/2021  Was in ED 08/30/2021 for hypoK+ and HypoMg but left AMA  Cycle # 2 FOLFOX + Avastin was on 09/07/2021. Cycle # 3 FOLFOX + Avastin was on 09/21/2021. Cycle # 4 FOLFOX + Avastin was on 10/12/2021. Today 10/26/2021. No fever, chills. No N/V. Fair appetite and energy level.  Oral sores on magic mouthwash and baking soda    Review of Systems;  CONSTITUTIONAL: No fever, chills. Fair appetite and energy level. ENMT: Eyes: No diplopia; Nose: No epistaxis. Mouth: No sore throat. Oral sores  RESPIRATORY: No hemoptysis, shortness of breath, cough. CARDIOVASCULAR: No chest pain, palpitations. GASTROINTESTINAL: No N.V, abdominal pain  GENITOURINARY: No dysuria, urinary frequency, hematuria. NEURO: No syncope, presyncope, headache. Remainder:ROS NEGATIVE    Past Medical History:      Diagnosis Date    [facility]     Anxiety     Cancer ([facility]     Chronic right shoulder pain     Convulsions/seizures ([facility] 09/16/2011    Depression     Depression 12/14/2020    GERD (gastroesophageal reflux disease)     Headache     HTN (hypertension) 06/16/2015    Mixed hyperlipidemia 02/17/2016    Osteoarthritis     Psychiatric problem     Substance abuse (HCC)     methadone     Medications:  Reviewed and reconciled. Allergies:  No Known Allergies    Physical Exam:  /70   Pulse 104   Temp 97.5 °F (36.4 °C)   Ht 5' 3.5\" (1.613 m)   Wt 139 lb 8 oz (63.3 kg)   LMP 09/08/2011   BMI 24.32 kg/m²   GENERAL: Alert, oriented x 3, not in distress  HEENT: PERRLA; EOMI. NECK: Supple. Without lymphadenopathy. LUNGS: Good air entry bilaterally. No wheezing, crackles or ronchi. CARDIOVASCULAR: Regular rate. No murmurs, rubs or gallops. ABDOMEN: Soft. Non-tender, non-distended. Positive bowel sounds  EXTREMITIES: Without clubbing, cyanosis, or edema. NEUROLOGIC: No focal deficits.    ECOG PS 1    Lab Results   Component Value Date    WBC 7.6 10/25/2021    HGB 9.4 (L) 10/25/2021    HCT 28.9 (L) 10/25/2021    MCV 93.2 10/25/2021     10/25/2021     Lab Results   Component Value Date     10/25/2021    K 3.4 (L) 10/25/2021    CL 97 (L) 10/25/2021    CO2 25 10/25/2021    BUN 5 (L) 10/25/2021    CREATININE 0.6 10/25/2021    GLUCOSE 126 (H) 10/25/2021    CALCIUM 8.4 (L) 10/25/2021    PROT 6.6 10/25/2021    LABALBU 3.4 (L) 10/25/2021    BILITOT 0.3 10/25/2021    ALKPHOS 139 (H) 10/25/2021    AST 16 10/25/2021    ALT 9 10/25/2021    LABGLOM >60 10/25/2021    GFRAA >60 10/25/2021     Lab Results   Component Value Date    MG 1.4 10/25/2021     Lab Results   Component Value Date    CEA 11.7 (H) 10/25/2021     Impression/Plan:  63 y/o female with clinical stage: [id number] Rectosigmoid Cancer  CT abdomen/pelvis 06/16/2021:  Circumferentially infiltrative mass in the upper rectum, consistent with malignancy  Metastatic lymph nodes in the adjacent mesorectum, as well as the precaval region of the upper abdomen. Multiple prominent liver metastases    CTA chest 06/16/2021 noted no evidence of PE.  7 mm nodule seen within the right lower lobe posteriorly worrisome for metastatic disease. No region of intrathoracic lymphadenopathy is identified    Flex sigmoidoscopy 06/29/2021:  Nearly complete obstructive rectal mass at 15cms from AV  Colon, rectum, biopsy - Colonic mucosa with high-grade dysplasia; see comment. COMMENT   Additional deeper levels were also examined    MRI Rectum on 06/29/2021:  7.7 cm rectosigmoid mass spanning and invading the peritoneal reflection with superior rectal lymphadenopathy. Stage: T4a N2  MRF: Clear (tumor margin >2 mm from MRF)   Sphincter involvement: No.   Suspicious extra mesorectal lymph nodes: No    CEA 25.4 on 07/14/2021    Laparoscopic loop sigmoid colostomy and liver biopsy 07/15/2021  Liver, biopsy - Adenocarcinoma, morphologically compatible with colorectal primary  NGS testing ([employer])   TMB 5Muts/Mb; No therapies or clinical trials  MSI-High Not detected  PTEN: Therapies with clinical benefit in tumor type: None  Therapies with clinical benefit in other tumor type: None    Case discussed with Dr. Erika Westfall [facility]  Systemic chemotherapy consists of FOLFOX + Avastin. Side effects reviewed. She agreed to proceed.  Mediport placed  Cycle # 1 FOLFOX + Avastin was on 08/17/2021. Was in ED 08/30/2021 for hypoK+ and HypoMg but left AMA  Cycle # 2 FOLFOX + Avastin was on 09/07/2021. CEA 16.4 on 09/07/2021. Cycle # 3 FOLFOX + Avastin was on 09/21/2021. CEA 13.8 on 09/20/2021. Cycle # 4 FOLFOX + Avastin was on 10/12/2021. CEA 12.3 on 10/11/2021. Cycle # 5 FOLFOX + Avastin is today 10/26/2021. CEA 11.7 on 10/25/2021. HypoK+ HypoMg to be replaced. RTC 2 weeks for Cycle # 6 FOLFOX + Avastin. Scans after next visit.     Carrington Lee MD   10/26/2021

## 2021-10-27 LAB — URINE CULTURE, ROUTINE: NORMAL

## 2021-10-28 ENCOUNTER — HOSPITAL ENCOUNTER (OUTPATIENT)
Dept: INFUSION THERAPY | Age: 61
Discharge: HOME | End: 2021-10-28
Payer: COMMERCIAL

## 2021-10-28 ENCOUNTER — CLINICAL DOCUMENTATION (OUTPATIENT)
Dept: INFUSION THERAPY | Age: 61
End: 2021-10-28

## 2021-10-28 DIAGNOSIS — I87.8 POOR VENOUS ACCESS: Primary | ICD-10-CM

## 2021-10-28 DIAGNOSIS — C20 RECTAL CANCER (HCC): Primary | ICD-10-CM

## 2021-10-28 DIAGNOSIS — K92.9 DISORDER OF STOMA: ICD-10-CM

## 2021-10-28 DIAGNOSIS — C20 RECTAL CANCER (HCC): ICD-10-CM

## 2021-10-28 PROCEDURE — 2580000003 HC RX 258: Performed by: NURSE PRACTITIONER

## 2021-10-28 PROCEDURE — 99214 OFFICE O/P EST MOD 30 MIN: CPT

## 2021-10-28 PROCEDURE — 6360000002 HC RX W HCPCS: Performed by: NURSE PRACTITIONER

## 2021-10-28 RX ORDER — SODIUM CHLORIDE 9 MG/ML
25 INJECTION, SOLUTION INTRAVENOUS PRN
Status: CANCELLED | OUTPATIENT
Start: 2021-10-28

## 2021-10-28 RX ORDER — HEPARIN SODIUM (PORCINE) LOCK FLUSH IV SOLN 100 UNIT/ML 100 UNIT/ML
500 SOLUTION INTRAVENOUS PRN
Status: DISCONTINUED | OUTPATIENT
Start: 2021-10-28 | End: 2021-10-29 | Stop reason: HOSPADM

## 2021-10-28 RX ORDER — SODIUM CHLORIDE 0.9 % (FLUSH) 0.9 %
5-40 SYRINGE (ML) INJECTION PRN
Status: CANCELLED | OUTPATIENT
Start: 2021-10-28

## 2021-10-28 RX ORDER — SODIUM CHLORIDE 0.9 % (FLUSH) 0.9 %
5-40 SYRINGE (ML) INJECTION PRN
Status: DISCONTINUED | OUTPATIENT
Start: 2021-10-28 | End: 2021-10-29 | Stop reason: HOSPADM

## 2021-10-28 RX ORDER — HEPARIN SODIUM (PORCINE) LOCK FLUSH IV SOLN 100 UNIT/ML 100 UNIT/ML
500 SOLUTION INTRAVENOUS PRN
Status: CANCELLED | OUTPATIENT
Start: 2021-10-28

## 2021-10-28 RX ADMIN — Medication 500 UNITS: at 12:16

## 2021-10-28 RX ADMIN — SODIUM CHLORIDE, PRESERVATIVE FREE 10 ML: 5 INJECTION INTRAVENOUS at 12:15

## 2021-10-28 NOTE — PROGRESS NOTES
Presents to clinic for CADD pump removal. Port site appears normal. Denies problems/concerns. Received 250.45 ml of 5-FU & reservoir 9.6 of 5-FU. Port flushed with 10 ml. NSS followed by 5 ml. Heparin Rinse prior to de access. DSD to area. Tolerated well. Encouraged to call clinic with questions/concerns.

## 2021-10-28 NOTE — PROGRESS NOTES
Patient came in today to have her pump disconnected and was complaining of enlarged lymph nodes at the submandibular areas and also in her inguinal areas she also complained of discomfort and her stoma was enlarging in size and becoming tender. She was experiencing cold symptoms she did have some's mild lymphadenopathy on both submandibular areas. I told her to check these if they do not go down by Monday to call us back. I also told her I would call Roldan Jackson, the stoma nurse, to see if she could come over and evaluate her stoma. I called Roldan Jackson, left a message on her answering machine. After 5 minutes she still had not returned to call so went to tell Mark Langston, which he had already left the office. [facility] called him back stating that she needed Dr. Quijano and she would call the patient and set up a time to meet with her.

## 2021-10-29 ENCOUNTER — NURSE TRIAGE (OUTPATIENT)
Dept: OTHER | Facility: CLINIC | Age: 61
End: 2021-10-29

## 2021-10-29 ENCOUNTER — OFFICE VISIT (OUTPATIENT)
Dept: FAMILY MEDICINE CLINIC | Age: 61
End: 2021-10-29
Payer: COMMERCIAL

## 2021-10-29 VITALS
SYSTOLIC BLOOD PRESSURE: 107 MMHG | TEMPERATURE: 100.2 F | WEIGHT: 136 LBS | BODY MASS INDEX: 23.22 KG/M2 | HEART RATE: 86 BPM | DIASTOLIC BLOOD PRESSURE: 71 MMHG | HEIGHT: 64 IN | RESPIRATION RATE: 22 BRPM | OXYGEN SATURATION: 96 %

## 2021-10-29 DIAGNOSIS — R05.9 COUGH: ICD-10-CM

## 2021-10-29 DIAGNOSIS — R50.9 FEVER, UNSPECIFIED FEVER CAUSE: ICD-10-CM

## 2021-10-29 DIAGNOSIS — J01.90 ACUTE NON-RECURRENT SINUSITIS, UNSPECIFIED LOCATION: Primary | ICD-10-CM

## 2021-10-29 PROCEDURE — 3017F COLORECTAL CA SCREEN DOC REV: CPT | Performed by: STUDENT IN AN ORGANIZED HEALTH CARE EDUCATION/TRAINING PROGRAM

## 2021-10-29 PROCEDURE — 99212 OFFICE O/P EST SF 10 MIN: CPT | Performed by: STUDENT IN AN ORGANIZED HEALTH CARE EDUCATION/TRAINING PROGRAM

## 2021-10-29 PROCEDURE — G8484 FLU IMMUNIZE NO ADMIN: HCPCS | Performed by: STUDENT IN AN ORGANIZED HEALTH CARE EDUCATION/TRAINING PROGRAM

## 2021-10-29 PROCEDURE — G8427 DOCREV CUR MEDS BY ELIG CLIN: HCPCS | Performed by: STUDENT IN AN ORGANIZED HEALTH CARE EDUCATION/TRAINING PROGRAM

## 2021-10-29 PROCEDURE — 4004F PT TOBACCO SCREEN RCVD TLK: CPT | Performed by: STUDENT IN AN ORGANIZED HEALTH CARE EDUCATION/TRAINING PROGRAM

## 2021-10-29 PROCEDURE — 99213 OFFICE O/P EST LOW 20 MIN: CPT | Performed by: STUDENT IN AN ORGANIZED HEALTH CARE EDUCATION/TRAINING PROGRAM

## 2021-10-29 PROCEDURE — G8420 CALC BMI NORM PARAMETERS: HCPCS | Performed by: STUDENT IN AN ORGANIZED HEALTH CARE EDUCATION/TRAINING PROGRAM

## 2021-10-29 RX ORDER — AMOXICILLIN AND CLAVULANATE POTASSIUM 875; 125 MG/1; MG/1
1 TABLET, FILM COATED ORAL 2 TIMES DAILY
Qty: 14 TABLET | Refills: 0 | Status: SHIPPED | OUTPATIENT
Start: 2021-10-29 | End: 2021-11-05

## 2021-10-29 NOTE — TELEPHONE ENCOUNTER
Received call from [address] at [facility] with Red Flag Complaint. Brief description of triage: Severe sore throat with runny nose, cough and sneezing, Immunocompromised due to Chemo. Triage indicates for patient to see PCP today in office. Care advice provided, patient verbalizes understanding; denies any other questions or concerns; instructed to call back for any new or worsening symptoms. Writer provided warm transfer to [facility] at [facility] for appointment scheduling. Attention Provider: Thank you for allowing me to participate in the care of your patient. The patient was connected to triage in response to information provided to the ECC/PSC. Please do not respond through this encounter as the response is not directed to a shared pool. Reason for Disposition   SEVERE sore throat pain    Answer Assessment - Initial Assessment Questions  1. ONSET: \"When did the throat start hurting? \" (Hours or days ago)       1 week ago    2. SEVERITY: \"How bad is the sore throat? \" (Scale 1-10; mild, moderate or severe)    - MILD (1-3):  doesn't interfere with eating or normal activities    - MODERATE (4-7): interferes with eating some solids and normal activities    - SEVERE (8-10):  excruciating pain, interferes with most normal activities    - SEVERE DYSPHAGIA: can't swallow liquids, drooling      8 severe    3. STREP EXPOSURE: \"Has there been any exposure to strep within the past week? \" If so, ask: \"What type of contact occurred? \"       Unsure    4. VIRAL SYMPTOMS: Isaac Cower there any symptoms of a cold, such as a runny nose, cough, hoarse voice or red eyes? \"       Runny nose, cough sneeze      5. FEVER: \"Do you have a fever? \" If so, ask: \"What is your temperature, how was it measured, and when did it start? \"      100.5-99.7 for 3 days    6. PUS ON THE TONSILS: \"Is there pus on the tonsils in the back of your throat? \"      Unable to assess    7.  OTHER SYMPTOMS: \"Do you have any other

## 2021-10-29 NOTE — PROGRESS NOTES
[facility] RESIDENCY PROGRAM  DATE OF VISIT : 10/29/2021    Patient : Nahid Major   Age : 62 y.o.  : 1960   MRN : [id number]   ______________________________________________________________________    Chief Complaint :   Chief Complaint   Patient presents with    Congestion     ongoing for 1 week    Pharyngitis       HPI : Nahid Major is 62 y.o. female who presented to the clinic today for nasal congestion and pharyngitis x 1.5 weeks. Hx significant for colorectal cancer with mets, currently on chemotherapy. Reports sore throat and runny nose with post nasal drip for the past week and a half. No SOB, ear pain. Reports headache (but states that is normal after chemo), increased cough, ankle swelling (common for her after chemo), diarrhea (common for her after chemo), and altered taste and smell (going on for much longer, likely due to chemo). Reports covid vaccine x 2. Mild fever 100.2 F today.       Past Medical History :  Past Medical History:   Diagnosis Date    Alcoholic ([address]     Anxiety     [facility]     Chronic right shoulder pain     Convulsions/seizures ([facility] 2011    Depression     Depression 2020    GERD (gastroesophageal reflux disease)     Headache     HTN (hypertension) 2015    Mixed hyperlipidemia 2016    Osteoarthritis     Psychiatric problem     Substance abuse ([facility]     methadone     Past Surgical History:   Procedure Laterality Date    BREAST SURGERY      augmentation silicone    CATHETER INSERTION N/A 2021    MEDIPORT INSERTION performed by Ruchi Doyle MD at [address] [place] [address]  07/15/2021    ECTOPIC PREGNANCY SURGERY      TUBAL LIGATION  1993       Allergies :   No Known Allergies    Medication List :    Current Outpatient Medications   Medication Sig Dispense Refill    amoxicillin-clavulanate (AUGMENTIN) 875-125 MG per tablet Take 1 tablet by mouth 2 times daily for 7 days 14 tablet 0    potassium chloride (KLOR-CON M) 20 MEQ extended release tablet Take 2 tablets by mouth daily for 3 days 6 tablet 0    oxyCODONE HCl (OXY-IR) 10 MG immediate release tablet Take 1 tablet by mouth every 6 hours as needed for Pain for up to 30 days. Intended supply: 30 days 120 tablet 0    atorvastatin (LIPITOR) 10 MG tablet TAKE 1 TABLET BY MOUTH DAILY 30 tablet 12    prochlorperazine (COMPAZINE) 10 MG tablet Take 1 tablet by mouth every 6 hours as needed (nausea/vomiting) 40 tablet 3    ibuprofen (ADVIL;MOTRIN) 800 MG tablet Take 1 tablet by mouth every 6 hours as needed for Pain 20 tablet 0    acetaminophen (TYLENOL) 325 MG tablet Take 650 mg by mouth every 6 hours as needed for Pain      hydroCHLOROthiazide (MICROZIDE) 12.5 MG capsule Take 12.5 mg by mouth daily      sertraline (ZOLOFT) 100 MG tablet TAKE 1 TABLET BY MOUTH ONCE EVERY DAY 30 tablet 3    omeprazole (PRILOSEC) 20 MG delayed release capsule Take 1 capsule by mouth daily (Patient taking differently: Take 20 mg by mouth as needed ) 30 capsule 3    potassium chloride (KLOR-CON M) 20 MEQ extended release tablet Take 2 tablets by mouth 2 times daily for 10 days 40 tablet 0     No current facility-administered medications for this visit.        ______________________________________________________________________    Physical Exam :    Vitals: /71 (Site: Right Upper Arm, Position: Sitting, Cuff Size: Medium Adult)   Pulse 86   Temp 100.2 °F (37.9 °C) (Temporal)   Resp 22   Ht 5' 3.5\" (1.613 m)   Wt 136 lb (61.7 kg)   LMP 09/08/2011   SpO2 96%   BMI 23.71 kg/m²   General Appearance: Awake, alert, oriented, and in mild distress  HEENT: NCAT, no pallor or icterus; No throat erythema or exudate. Mild tenderness to palpation frontal and maxillary sinuses  Neck: Symmetrical, trachea midline. No cervical or supraclavicular lymphadenopathy appreciated  Chest wall/Lung: CTAB, respirations unlabored.  No ronchi/wheezing/rales Heart: RRR, normal S1 and S2, no murmurs, rubs or gallops  Abdomen: SNTND  Extremities: Extremities normal, atraumatic, no cyanosis, clubbing or edema. Neurologic: Alert&Oriented x3. No focal motor deficits detected   Psychiatric: Normal mood. Normal affect. Normal behavior  ______________________________________________________________________    Assessment & Plan :    1. Acute non-recurrent sinusitis, unspecified location  - amoxicillin-clavulanate (AUGMENTIN) 875-125 MG per tablet; Take 1 tablet by mouth 2 times daily for 7 days  Dispense: 14 tablet; Refill: 0    2. Cough  - COVID-19 Ambulatory; Future  - amoxicillin-clavulanate (AUGMENTIN) 875-125 MG per tablet; Take 1 tablet by mouth 2 times daily for 7 days  Dispense: 14 tablet; Refill: 0    3. Fever, unspecified fever cause  - COVID-19 Ambulatory; Future  - amoxicillin-clavulanate (AUGMENTIN) 875-125 MG per tablet; Take 1 tablet by mouth 2 times daily for 7 days  Dispense: 14 tablet; Refill: 0      Additional plan and future considerations:       Return to Office: Return 1-2 weeks if no improvement.     Karena Rebollar DO   Case discussed with Dr. Pedro Leyva

## 2021-10-29 NOTE — PROGRESS NOTES
Attending Physician Statement    S:   Chief Complaint   Patient presents with    Congestion     ongoing for 1 week    Pharyngitis      1.5 weeks , sinus congestion. Fevers and chills. tmax 101. No sob. Does have a cough. She is getting chemo for colorectal cancer. Has diarrhea that is unchanged. Has altered taste and smell. No covid exposure   O: Blood pressure 107/71, pulse 86, temperature 100.2 °F (37.9 °C), temperature source Temporal, resp. rate 22, height 5' 3.5\" (1.613 m), weight 136 lb (61.7 kg), last menstrual period 09/08/2011, SpO2 96 %, not currently breastfeeding. Exam:   Heart - RRR   Lungs - clear   HEENT- no tonsillar erythema or exudates. No significant lymphadenopathy. Mild ttp over sinuses   A: sinus infectoin  P:  augmentin 875mg. BID x 7 days    covid test    Follow-up as ordered    Attending Attestation   I have discussed the case, including pertinent history and exam findings with the resident. I agree with the documented assessment and plan.

## 2021-10-30 DIAGNOSIS — R50.9 FEVER, UNSPECIFIED FEVER CAUSE: ICD-10-CM

## 2021-10-30 DIAGNOSIS — R05.9 COUGH: ICD-10-CM

## 2021-10-31 LAB
SARS-COV-2: NOT DETECTED
SOURCE: NORMAL

## 2021-11-05 DIAGNOSIS — C20 RECTAL CANCER (HCC): Primary | ICD-10-CM

## 2021-11-08 ENCOUNTER — HOSPITAL ENCOUNTER (OUTPATIENT)
Dept: INFUSION THERAPY | Age: 61
Discharge: HOME | End: 2021-11-08
Payer: COMMERCIAL

## 2021-11-08 ENCOUNTER — TELEPHONE (OUTPATIENT)
Dept: INFUSION THERAPY | Age: 61
End: 2021-11-08

## 2021-11-08 DIAGNOSIS — I87.8 POOR VENOUS ACCESS: ICD-10-CM

## 2021-11-08 DIAGNOSIS — C20 RECTAL CANCER (HCC): Primary | ICD-10-CM

## 2021-11-08 LAB
ALBUMIN SERPL-MCNC: 4.1 G/DL (ref 3.5–5.2)
ALP BLD-CCNC: 138 U/L (ref 35–104)
ALT SERPL-CCNC: 9 U/L (ref 0–32)
ANION GAP SERPL CALCULATED.3IONS-SCNC: 12 MMOL/L (ref 7–16)
AST SERPL-CCNC: 19 U/L (ref 0–31)
BASOPHILS ABSOLUTE: 0 E9/L (ref 0–0.2)
BASOPHILS RELATIVE PERCENT: 0 % (ref 0–2)
BILIRUB SERPL-MCNC: 0.3 MG/DL (ref 0–1.2)
BUN BLDV-MCNC: 10 MG/DL (ref 6–23)
CALCIUM SERPL-MCNC: 9.3 MG/DL (ref 8.6–10.2)
CHLORIDE BLD-SCNC: 102 MMOL/L (ref 98–107)
CO2: 24 MMOL/L (ref 22–29)
CREAT SERPL-MCNC: 0.5 MG/DL (ref 0.5–1)
EOSINOPHILS ABSOLUTE: 0.07 E9/L (ref 0.05–0.5)
EOSINOPHILS RELATIVE PERCENT: 2 % (ref 0–6)
GFR AFRICAN AMERICAN: >60
GFR NON-AFRICAN AMERICAN: >60 ML/MIN/1.73
GLUCOSE BLD-MCNC: 118 MG/DL (ref 74–99)
HCT VFR BLD CALC: 34.1 % (ref 34–48)
HEMOGLOBIN: 11.3 G/DL (ref 11.5–15.5)
LYMPHOCYTES ABSOLUTE: 2.59 E9/L (ref 1.5–4)
LYMPHOCYTES RELATIVE PERCENT: 70 % (ref 20–42)
MAGNESIUM: 1.8 MG/DL (ref 1.6–2.6)
MCH RBC QN AUTO: 30.7 PG (ref 26–35)
MCHC RBC AUTO-ENTMCNC: 33.1 % (ref 32–34.5)
MCV RBC AUTO: 92.7 FL (ref 80–99.9)
MONOCYTES ABSOLUTE: 0.63 E9/L (ref 0.1–0.95)
MONOCYTES RELATIVE PERCENT: 17 % (ref 2–12)
NEUTROPHILS ABSOLUTE: 0.41 E9/L (ref 1.8–7.3)
NEUTROPHILS RELATIVE PERCENT: 11 % (ref 43–80)
PDW BLD-RTO: 19.5 FL (ref 11.5–15)
PLATELET # BLD: 211 E9/L (ref 130–450)
PMV BLD AUTO: 9.6 FL (ref 7–12)
POTASSIUM SERPL-SCNC: 3.4 MMOL/L (ref 3.5–5)
RBC # BLD: 3.68 E12/L (ref 3.5–5.5)
SODIUM BLD-SCNC: 138 MMOL/L (ref 132–146)
TOTAL PROTEIN: 7.4 G/DL (ref 6.4–8.3)
WBC # BLD: 3.7 E9/L (ref 4.5–11.5)

## 2021-11-08 PROCEDURE — 85025 COMPLETE CBC W/AUTO DIFF WBC: CPT

## 2021-11-08 PROCEDURE — 82378 CARCINOEMBRYONIC ANTIGEN: CPT

## 2021-11-08 PROCEDURE — 80053 COMPREHEN METABOLIC PANEL: CPT

## 2021-11-08 PROCEDURE — 83735 ASSAY OF MAGNESIUM: CPT

## 2021-11-08 PROCEDURE — 6360000002 HC RX W HCPCS: Performed by: NURSE PRACTITIONER

## 2021-11-08 PROCEDURE — 2580000003 HC RX 258: Performed by: NURSE PRACTITIONER

## 2021-11-08 PROCEDURE — 36591 DRAW BLOOD OFF VENOUS DEVICE: CPT

## 2021-11-08 RX ORDER — SODIUM CHLORIDE 9 MG/ML
25 INJECTION, SOLUTION INTRAVENOUS PRN
Status: CANCELLED | OUTPATIENT
Start: 2021-11-08

## 2021-11-08 RX ORDER — HEPARIN SODIUM (PORCINE) LOCK FLUSH IV SOLN 100 UNIT/ML 100 UNIT/ML
500 SOLUTION INTRAVENOUS PRN
Status: CANCELLED | OUTPATIENT
Start: 2021-11-08

## 2021-11-08 RX ORDER — SODIUM CHLORIDE 0.9 % (FLUSH) 0.9 %
5-40 SYRINGE (ML) INJECTION PRN
Status: DISCONTINUED | OUTPATIENT
Start: 2021-11-08 | End: 2021-11-09 | Stop reason: HOSPADM

## 2021-11-08 RX ORDER — HEPARIN SODIUM (PORCINE) LOCK FLUSH IV SOLN 100 UNIT/ML 100 UNIT/ML
500 SOLUTION INTRAVENOUS PRN
Status: DISCONTINUED | OUTPATIENT
Start: 2021-11-08 | End: 2021-11-09 | Stop reason: HOSPADM

## 2021-11-08 RX ORDER — SODIUM CHLORIDE 0.9 % (FLUSH) 0.9 %
5-40 SYRINGE (ML) INJECTION PRN
Status: CANCELLED | OUTPATIENT
Start: 2021-11-08

## 2021-11-08 RX ADMIN — SODIUM CHLORIDE, PRESERVATIVE FREE 500 UNITS: 5 INJECTION INTRAVENOUS at 12:05

## 2021-11-08 RX ADMIN — Medication 30 ML: at 12:05

## 2021-11-08 NOTE — TELEPHONE ENCOUNTER
Received call from MEDICAL [facility] at [facility], regarding patients prescription for her Ensure [place]. Unfortunately the Ensure [place] is backordered through spring of 2022. Therefore, will recommend the nearly equivalent product, Boost Plus. Attempted to contact patient, no answer, VM left for call back. Will resend order for Boost, patient encouraged to decline with Guerrero Warner and update this clinician if she would not want that product.  Sherren Caul, RD,,LD

## 2021-11-09 ENCOUNTER — HOSPITAL ENCOUNTER (OUTPATIENT)
Dept: INFUSION THERAPY | Age: 61
Discharge: HOME | End: 2021-11-09
Payer: COMMERCIAL

## 2021-11-09 ENCOUNTER — OFFICE VISIT (OUTPATIENT)
Dept: ONCOLOGY | Age: 61
End: 2021-11-09
Payer: COMMERCIAL

## 2021-11-09 VITALS
WEIGHT: 130.4 LBS | HEIGHT: 64 IN | SYSTOLIC BLOOD PRESSURE: 129 MMHG | HEART RATE: 84 BPM | BODY MASS INDEX: 22.26 KG/M2 | TEMPERATURE: 97.7 F | DIASTOLIC BLOOD PRESSURE: 80 MMHG | OXYGEN SATURATION: 98 %

## 2021-11-09 DIAGNOSIS — C20 RECTAL CANCER (HCC): Primary | ICD-10-CM

## 2021-11-09 DIAGNOSIS — C20 RECTAL CANCER (HCC): ICD-10-CM

## 2021-11-09 DIAGNOSIS — I87.8 POOR VENOUS ACCESS: Primary | ICD-10-CM

## 2021-11-09 LAB
ANISOCYTOSIS: ABNORMAL
BASOPHILS ABSOLUTE: 0.03 E9/L (ref 0–0.2)
BASOPHILS RELATIVE PERCENT: 0.9 % (ref 0–2)
BLASTS RELATIVE PERCENT: 0.9 % (ref 0–0)
CEA: 11.8 NG/ML (ref 0–5.2)
EOSINOPHILS ABSOLUTE: 0 E9/L (ref 0.05–0.5)
EOSINOPHILS RELATIVE PERCENT: 1.1 % (ref 0–6)
HCT VFR BLD CALC: 36.3 % (ref 34–48)
HEMOGLOBIN: 11.9 G/DL (ref 11.5–15.5)
LYMPHOCYTES ABSOLUTE: 2.62 E9/L (ref 1.5–4)
LYMPHOCYTES RELATIVE PERCENT: 68.7 % (ref 20–42)
MCH RBC QN AUTO: 30.5 PG (ref 26–35)
MCHC RBC AUTO-ENTMCNC: 32.8 % (ref 32–34.5)
MCV RBC AUTO: 93.1 FL (ref 80–99.9)
MONOCYTES ABSOLUTE: 0.38 E9/L (ref 0.1–0.95)
MONOCYTES RELATIVE PERCENT: 9.6 % (ref 2–12)
NEUTROPHILS ABSOLUTE: 0.76 E9/L (ref 1.8–7.3)
NEUTROPHILS RELATIVE PERCENT: 20 % (ref 43–80)
OVALOCYTES: ABNORMAL
PDW BLD-RTO: 19.4 FL (ref 11.5–15)
PLATELET # BLD: 258 E9/L (ref 130–450)
PMV BLD AUTO: 9.9 FL (ref 7–12)
POIKILOCYTES: ABNORMAL
POLYCHROMASIA: ABNORMAL
RBC # BLD: 3.9 E12/L (ref 3.5–5.5)
WBC # BLD: 3.8 E9/L (ref 4.5–11.5)

## 2021-11-09 PROCEDURE — G8420 CALC BMI NORM PARAMETERS: HCPCS | Performed by: INTERNAL MEDICINE

## 2021-11-09 PROCEDURE — G8484 FLU IMMUNIZE NO ADMIN: HCPCS | Performed by: INTERNAL MEDICINE

## 2021-11-09 PROCEDURE — G8427 DOCREV CUR MEDS BY ELIG CLIN: HCPCS | Performed by: INTERNAL MEDICINE

## 2021-11-09 PROCEDURE — 3017F COLORECTAL CA SCREEN DOC REV: CPT | Performed by: INTERNAL MEDICINE

## 2021-11-09 PROCEDURE — 85025 COMPLETE CBC W/AUTO DIFF WBC: CPT

## 2021-11-09 PROCEDURE — 99214 OFFICE O/P EST MOD 30 MIN: CPT | Performed by: INTERNAL MEDICINE

## 2021-11-09 PROCEDURE — 6360000002 HC RX W HCPCS: Performed by: NURSE PRACTITIONER

## 2021-11-09 PROCEDURE — 4004F PT TOBACCO SCREEN RCVD TLK: CPT | Performed by: INTERNAL MEDICINE

## 2021-11-09 PROCEDURE — 99214 OFFICE O/P EST MOD 30 MIN: CPT

## 2021-11-09 PROCEDURE — 2580000003 HC RX 258: Performed by: NURSE PRACTITIONER

## 2021-11-09 PROCEDURE — 36591 DRAW BLOOD OFF VENOUS DEVICE: CPT

## 2021-11-09 RX ORDER — DEXAMETHASONE SODIUM PHOSPHATE 10 MG/ML
10 INJECTION, SOLUTION INTRAMUSCULAR; INTRAVENOUS ONCE
Status: CANCELLED | OUTPATIENT
Start: 2021-11-16

## 2021-11-09 RX ORDER — SODIUM CHLORIDE 9 MG/ML
25 INJECTION, SOLUTION INTRAVENOUS PRN
Status: CANCELLED | OUTPATIENT
Start: 2021-11-09

## 2021-11-09 RX ORDER — SODIUM CHLORIDE 0.9 % (FLUSH) 0.9 %
5-40 SYRINGE (ML) INJECTION PRN
Status: CANCELLED | OUTPATIENT
Start: 2021-11-09

## 2021-11-09 RX ORDER — DIPHENHYDRAMINE HYDROCHLORIDE 50 MG/ML
50 INJECTION INTRAMUSCULAR; INTRAVENOUS ONCE
Status: CANCELLED | OUTPATIENT
Start: 2021-11-16 | End: 2021-11-09

## 2021-11-09 RX ORDER — HEPARIN SODIUM (PORCINE) LOCK FLUSH IV SOLN 100 UNIT/ML 100 UNIT/ML
500 SOLUTION INTRAVENOUS PRN
Status: CANCELLED | OUTPATIENT
Start: 2021-11-16

## 2021-11-09 RX ORDER — SODIUM CHLORIDE 9 MG/ML
25 INJECTION, SOLUTION INTRAVENOUS PRN
Status: CANCELLED | OUTPATIENT
Start: 2021-11-16

## 2021-11-09 RX ORDER — EPINEPHRINE 1 MG/ML
0.3 INJECTION, SOLUTION, CONCENTRATE INTRAVENOUS PRN
Status: CANCELLED | OUTPATIENT
Start: 2021-11-16

## 2021-11-09 RX ORDER — HEPARIN SODIUM (PORCINE) LOCK FLUSH IV SOLN 100 UNIT/ML 100 UNIT/ML
500 SOLUTION INTRAVENOUS PRN
Status: CANCELLED | OUTPATIENT
Start: 2021-11-09

## 2021-11-09 RX ORDER — SODIUM CHLORIDE 9 MG/ML
INJECTION, SOLUTION INTRAVENOUS ONCE
Status: CANCELLED | OUTPATIENT
Start: 2021-11-16 | End: 2021-11-09

## 2021-11-09 RX ORDER — MEPERIDINE HYDROCHLORIDE 25 MG/ML
12.5 INJECTION INTRAMUSCULAR; INTRAVENOUS; SUBCUTANEOUS ONCE
Status: CANCELLED | OUTPATIENT
Start: 2021-11-16 | End: 2021-11-09

## 2021-11-09 RX ORDER — SODIUM CHLORIDE 0.9 % (FLUSH) 0.9 %
5-40 SYRINGE (ML) INJECTION PRN
Status: DISCONTINUED | OUTPATIENT
Start: 2021-11-09 | End: 2021-11-10 | Stop reason: HOSPADM

## 2021-11-09 RX ORDER — METHYLPREDNISOLONE SODIUM SUCCINATE 125 MG/2ML
125 INJECTION, POWDER, LYOPHILIZED, FOR SOLUTION INTRAMUSCULAR; INTRAVENOUS ONCE
Status: CANCELLED | OUTPATIENT
Start: 2021-11-16 | End: 2021-11-09

## 2021-11-09 RX ORDER — FLUOROURACIL 50 MG/ML
400 INJECTION, SOLUTION INTRAVENOUS ONCE
Status: CANCELLED | OUTPATIENT
Start: 2021-11-16

## 2021-11-09 RX ORDER — HEPARIN SODIUM (PORCINE) LOCK FLUSH IV SOLN 100 UNIT/ML 100 UNIT/ML
500 SOLUTION INTRAVENOUS PRN
Status: DISCONTINUED | OUTPATIENT
Start: 2021-11-09 | End: 2021-11-10 | Stop reason: HOSPADM

## 2021-11-09 RX ORDER — SODIUM CHLORIDE 0.9 % (FLUSH) 0.9 %
5-40 SYRINGE (ML) INJECTION PRN
Status: CANCELLED | OUTPATIENT
Start: 2021-11-16

## 2021-11-09 RX ORDER — DEXTROSE MONOHYDRATE 50 MG/ML
INJECTION, SOLUTION INTRAVENOUS ONCE
Status: CANCELLED | OUTPATIENT
Start: 2021-11-16 | End: 2021-11-09

## 2021-11-09 RX ORDER — PALONOSETRON HYDROCHLORIDE 0.05 MG/ML
0.25 INJECTION, SOLUTION INTRAVENOUS ONCE
Status: CANCELLED | OUTPATIENT
Start: 2021-11-16

## 2021-11-09 RX ORDER — SODIUM CHLORIDE 9 MG/ML
INJECTION, SOLUTION INTRAVENOUS CONTINUOUS
Status: CANCELLED | OUTPATIENT
Start: 2021-11-16

## 2021-11-09 RX ADMIN — SODIUM CHLORIDE, PRESERVATIVE FREE 10 ML: 5 INJECTION INTRAVENOUS at 09:05

## 2021-11-09 RX ADMIN — Medication 500 UNITS: at 10:37

## 2021-11-09 NOTE — PROGRESS NOTES
Talked to catherine in lab, machines are still down. She said her [address] is 0.35 today, hopefully will be turned out soon.  Rina Kim informed Dr. Juan Ahuja

## 2021-11-09 NOTE — PROGRESS NOTES
Patient presents to clinic for office visit/labs/port flush today. R  SQ port accessed per policy using 20 G 8.34\" Iglesias needle for good blood return. Specimen sent to lab. Site flushed easily with 10 mL NSS followed by 5 mL Heparin solution 100 units/ml rinse prior to de-access. Dry sterile dressing to area. Tolerated procedure well. Encouraged to schedule port flush every 4 weeks.

## 2021-11-09 NOTE — PROGRESS NOTES
Department of [place] [address]   Attending Clinic Note    Reason for Visit: Follow-up on a patient with Rectal cancer    PCP:  Flori Munoz MD    History of Present Illness:  61 y/o female who was complaining of diarrhea blood in stool and painful defecation    CT abdomen/pelvis 06/16/2021:  Circumferentially infiltrative mass in the upper rectum, consistent with malignancy  Metastatic lymph nodes in the adjacent mesorectum, as well as the precaval region of the upper abdomen. Multiple prominent liver metastases    CTA chest 06/16/2021 noted no evidence of PE.  7 mm nodule seen within the right lower lobe posteriorly worrisome for metastatic disease. No region of intrathoracic lymphadenopathy is identified    Flex sigmoidoscopy 06/29/2021:  Nearly complete obstructive rectal mass at 15cms from AV  Colon, rectum, biopsy - Colonic mucosa with high-grade dysplasia; see comment. COMMENT   Additional deeper levels were also examined    MRI Rectum on 06/29/2021:  7.7 cm rectosigmoid mass spanning and invading the peritoneal reflection with superior rectal lymphadenopathy. Stage: T4a N2  MRF: Clear (tumor margin >2 mm from MRF)   Sphincter involvement: No.   Suspicious extra mesorectal lymph nodes: No    CEA 25.4 on 07/14/2021    Laparoscopic loop sigmoid colostomy and liver biopsy 07/15/2021  Liver, biopsy - Adenocarcinoma, morphologically compatible with colorectal primary  NGS testing ([address] [facility]) ordered and pending    Case discussed with Dr. Octavia Sandhu [facility]  Systemic chemotherapy consists of FOLFOX + Avastin. Side effects reviewed. She agreed to proceed. Mediport placed  Cycle # 1 FOLFOX + Avastin was on 08/17/2021  Was in ED 08/30/2021 for hypoK+ and HypoMg but left AMA  Cycle # 2 FOLFOX + Avastin was on 09/07/2021. Cycle # 3 FOLFOX + Avastin was on 09/21/2021. Cycle # 4 FOLFOX + Avastin was on 10/12/2021. Cycle # 5 FOLFOX + Avastin was on 10/26/2021.    Cycle # 6 FOLFOX + Avastin is today 11/08/2021    AST 19 11/08/2021    ALT 9 11/08/2021    LABGLOM >60 11/08/2021    GFRAA >60 11/08/2021     Lab Results   Component Value Date    MG 1.8 11/08/2021     Lab Results   Component Value Date    CEA 11.8 (H) 11/08/2021     Impression/Plan:  61 y/o female with clinical stage: [id number] Rectosigmoid Cancer  CT abdomen/pelvis 06/16/2021:  Circumferentially infiltrative mass in the upper rectum, consistent with malignancy  Metastatic lymph nodes in the adjacent mesorectum, as well as the precaval region of the upper abdomen. Multiple prominent liver metastases    CTA chest 06/16/2021 noted no evidence of PE.  7 mm nodule seen within the right lower lobe posteriorly worrisome for metastatic disease. No region of intrathoracic lymphadenopathy is identified    Flex sigmoidoscopy 06/29/2021:  Nearly complete obstructive rectal mass at 15cms from AV  Colon, rectum, biopsy - Colonic mucosa with high-grade dysplasia; see comment. COMMENT   Additional deeper levels were also examined    MRI Rectum on 06/29/2021:  7.7 cm rectosigmoid mass spanning and invading the peritoneal reflection with superior rectal lymphadenopathy. Stage: T4a N2  MRF: Clear (tumor margin >2 mm from MRF)   Sphincter involvement: No.   Suspicious extra mesorectal lymph nodes: No    CEA 25.4 on 07/14/2021    Laparoscopic loop sigmoid colostomy and liver biopsy 07/15/2021  Liver, biopsy - Adenocarcinoma, morphologically compatible with colorectal primary  NGS testing ([employer])   TMB 5Muts/Mb; No therapies or clinical trials  MSI-High Not detected  PTEN: Therapies with clinical benefit in tumor type: None  Therapies with clinical benefit in other tumor type: None    Case discussed with Dr. Michael Montenegro [facility]  Systemic chemotherapy consists of FOLFOX + Avastin. Side effects reviewed. She agreed to proceed. Mediport placed  Cycle # 1 FOLFOX + Avastin was on 08/17/2021.    Was in ED 08/30/2021 for hypoK+ and HypoMg but left AMA  Cycle # 2 FOLFOX + Avastin was on 09/07/2021. CEA 16.4 on 09/07/2021. Cycle # 3 FOLFOX + Avastin was on 09/21/2021. CEA 13.8 on 09/20/2021. Cycle # 4 FOLFOX + Avastin was on 10/12/2021. CEA 12.3 on 10/11/2021. Cycle # 5 FOLFOX + Avastin was on 10/26/2021. CEA 11.7 on 10/25/2021. Cycle # 6 FOLFOX + Avastin held today 11/09/2021 due to leukopenia/neutropanie. CEA 11.8 on 11/08/2021.     RTC next week for possible Cycle # 6 FOLFOX + Avastin    Liz Frenandes MD   11/9/2021

## 2021-11-10 RX ORDER — OMEPRAZOLE 20 MG/1
20 CAPSULE, DELAYED RELEASE ORAL DAILY
Qty: 30 CAPSULE | Refills: 3 | Status: SHIPPED
Start: 2021-11-10 | End: 2022-01-20 | Stop reason: SDUPTHER

## 2021-11-10 RX ORDER — HYDROCHLOROTHIAZIDE 12.5 MG/1
CAPSULE, GELATIN COATED ORAL
Qty: 30 CAPSULE | Refills: 11 | OUTPATIENT
Start: 2021-11-10

## 2021-11-15 ENCOUNTER — HOSPITAL ENCOUNTER (OUTPATIENT)
Dept: INFUSION THERAPY | Age: 61
Discharge: HOME | End: 2021-11-15
Payer: COMMERCIAL

## 2021-11-15 DIAGNOSIS — I87.8 POOR VENOUS ACCESS: ICD-10-CM

## 2021-11-15 DIAGNOSIS — C20 RECTAL CANCER (HCC): Primary | ICD-10-CM

## 2021-11-15 LAB
ALBUMIN SERPL-MCNC: 3.7 G/DL (ref 3.5–5.2)
ALP BLD-CCNC: 140 U/L (ref 35–104)
ALT SERPL-CCNC: 11 U/L (ref 0–32)
ANION GAP SERPL CALCULATED.3IONS-SCNC: 11 MMOL/L (ref 7–16)
AST SERPL-CCNC: 19 U/L (ref 0–31)
BASOPHILS ABSOLUTE: 0.06 E9/L (ref 0–0.2)
BASOPHILS RELATIVE PERCENT: 0.9 % (ref 0–2)
BILIRUB SERPL-MCNC: 0.2 MG/DL (ref 0–1.2)
BUN BLDV-MCNC: 6 MG/DL (ref 6–23)
CALCIUM SERPL-MCNC: 8.7 MG/DL (ref 8.6–10.2)
CHLORIDE BLD-SCNC: 102 MMOL/L (ref 98–107)
CO2: 27 MMOL/L (ref 22–29)
CREAT SERPL-MCNC: 0.5 MG/DL (ref 0.5–1)
EOSINOPHILS ABSOLUTE: 0.04 E9/L (ref 0.05–0.5)
EOSINOPHILS RELATIVE PERCENT: 0.6 % (ref 0–6)
GFR AFRICAN AMERICAN: >60
GFR NON-AFRICAN AMERICAN: >60 ML/MIN/1.73
GLUCOSE BLD-MCNC: 119 MG/DL (ref 74–99)
HCT VFR BLD CALC: 32.5 % (ref 34–48)
HEMOGLOBIN: 10.9 G/DL (ref 11.5–15.5)
IMMATURE GRANULOCYTES #: 0.07 E9/L
IMMATURE GRANULOCYTES %: 1 % (ref 0–5)
LYMPHOCYTES ABSOLUTE: 3 E9/L (ref 1.5–4)
LYMPHOCYTES RELATIVE PERCENT: 43.4 % (ref 20–42)
MAGNESIUM: 1.6 MG/DL (ref 1.6–2.6)
MCH RBC QN AUTO: 30.9 PG (ref 26–35)
MCHC RBC AUTO-ENTMCNC: 33.5 % (ref 32–34.5)
MCV RBC AUTO: 92.1 FL (ref 80–99.9)
MONOCYTES ABSOLUTE: 0.86 E9/L (ref 0.1–0.95)
MONOCYTES RELATIVE PERCENT: 12.4 % (ref 2–12)
NEUTROPHILS ABSOLUTE: 2.89 E9/L (ref 1.8–7.3)
NEUTROPHILS RELATIVE PERCENT: 41.7 % (ref 43–80)
PDW BLD-RTO: 18.6 FL (ref 11.5–15)
PLATELET # BLD: 226 E9/L (ref 130–450)
PMV BLD AUTO: 9.3 FL (ref 7–12)
POTASSIUM SERPL-SCNC: 3 MMOL/L (ref 3.5–5)
RBC # BLD: 3.53 E12/L (ref 3.5–5.5)
SODIUM BLD-SCNC: 140 MMOL/L (ref 132–146)
TOTAL PROTEIN: 6.7 G/DL (ref 6.4–8.3)
WBC # BLD: 6.9 E9/L (ref 4.5–11.5)

## 2021-11-15 PROCEDURE — 2580000003 HC RX 258: Performed by: NURSE PRACTITIONER

## 2021-11-15 PROCEDURE — 80053 COMPREHEN METABOLIC PANEL: CPT

## 2021-11-15 PROCEDURE — 6360000002 HC RX W HCPCS: Performed by: NURSE PRACTITIONER

## 2021-11-15 PROCEDURE — 83735 ASSAY OF MAGNESIUM: CPT

## 2021-11-15 PROCEDURE — 36591 DRAW BLOOD OFF VENOUS DEVICE: CPT

## 2021-11-15 PROCEDURE — 85025 COMPLETE CBC W/AUTO DIFF WBC: CPT

## 2021-11-15 RX ORDER — SODIUM CHLORIDE 9 MG/ML
25 INJECTION, SOLUTION INTRAVENOUS PRN
Status: CANCELLED | OUTPATIENT
Start: 2021-11-15

## 2021-11-15 RX ORDER — SODIUM CHLORIDE 0.9 % (FLUSH) 0.9 %
5-40 SYRINGE (ML) INJECTION PRN
Status: CANCELLED | OUTPATIENT
Start: 2021-11-15

## 2021-11-15 RX ORDER — HEPARIN SODIUM (PORCINE) LOCK FLUSH IV SOLN 100 UNIT/ML 100 UNIT/ML
500 SOLUTION INTRAVENOUS PRN
Status: DISCONTINUED | OUTPATIENT
Start: 2021-11-15 | End: 2021-11-16 | Stop reason: HOSPADM

## 2021-11-15 RX ORDER — SODIUM CHLORIDE 0.9 % (FLUSH) 0.9 %
5-40 SYRINGE (ML) INJECTION PRN
Status: DISCONTINUED | OUTPATIENT
Start: 2021-11-15 | End: 2021-11-16 | Stop reason: HOSPADM

## 2021-11-15 RX ORDER — HEPARIN SODIUM (PORCINE) LOCK FLUSH IV SOLN 100 UNIT/ML 100 UNIT/ML
500 SOLUTION INTRAVENOUS PRN
Status: CANCELLED | OUTPATIENT
Start: 2021-11-15

## 2021-11-15 RX ADMIN — Medication 500 UNITS: at 11:55

## 2021-11-15 RX ADMIN — SODIUM CHLORIDE, PRESERVATIVE FREE 10 ML: 5 INJECTION INTRAVENOUS at 11:55

## 2021-11-15 NOTE — PROGRESS NOTES
Patient presents to clinic for labs today. Right chest SQ port accessed per policy using 20 G 7.97 inch Iglesias needle for good blood return. Aspirate for waste and specimen sent to lab. Site flushed easily with 10 mL NSS followed by 5 mL Heparin solution 100 units/ml rinse prior to de-access. Dry sterile dressing to area. Tolerated procedure well. Encouraged to schedule port flush every 4 weeks.

## 2021-11-16 ENCOUNTER — OFFICE VISIT (OUTPATIENT)
Dept: ONCOLOGY | Age: 61
End: 2021-11-16
Payer: COMMERCIAL

## 2021-11-16 ENCOUNTER — HOSPITAL ENCOUNTER (OUTPATIENT)
Dept: INFUSION THERAPY | Age: 61
Discharge: HOME | End: 2021-11-16
Payer: COMMERCIAL

## 2021-11-16 VITALS — HEART RATE: 65 BPM | SYSTOLIC BLOOD PRESSURE: 166 MMHG | DIASTOLIC BLOOD PRESSURE: 83 MMHG

## 2021-11-16 VITALS
SYSTOLIC BLOOD PRESSURE: 146 MMHG | BODY MASS INDEX: 22.59 KG/M2 | WEIGHT: 132.3 LBS | HEIGHT: 64 IN | HEART RATE: 76 BPM | DIASTOLIC BLOOD PRESSURE: 83 MMHG | TEMPERATURE: 97.7 F | OXYGEN SATURATION: 98 %

## 2021-11-16 DIAGNOSIS — C20 RECTAL CANCER (HCC): Primary | ICD-10-CM

## 2021-11-16 PROCEDURE — G8484 FLU IMMUNIZE NO ADMIN: HCPCS | Performed by: INTERNAL MEDICINE

## 2021-11-16 PROCEDURE — 96417 CHEMO IV INFUS EACH ADDL SEQ: CPT

## 2021-11-16 PROCEDURE — 96413 CHEMO IV INFUSION 1 HR: CPT

## 2021-11-16 PROCEDURE — 96368 THER/DIAG CONCURRENT INF: CPT

## 2021-11-16 PROCEDURE — G8427 DOCREV CUR MEDS BY ELIG CLIN: HCPCS | Performed by: INTERNAL MEDICINE

## 2021-11-16 PROCEDURE — 99214 OFFICE O/P EST MOD 30 MIN: CPT | Performed by: INTERNAL MEDICINE

## 2021-11-16 PROCEDURE — 96375 TX/PRO/DX INJ NEW DRUG ADDON: CPT

## 2021-11-16 PROCEDURE — 96416 CHEMO PROLONG INFUSE W/PUMP: CPT

## 2021-11-16 PROCEDURE — 2580000003 HC RX 258: Performed by: INTERNAL MEDICINE

## 2021-11-16 PROCEDURE — 4004F PT TOBACCO SCREEN RCVD TLK: CPT | Performed by: INTERNAL MEDICINE

## 2021-11-16 PROCEDURE — 96415 CHEMO IV INFUSION ADDL HR: CPT

## 2021-11-16 PROCEDURE — 3017F COLORECTAL CA SCREEN DOC REV: CPT | Performed by: INTERNAL MEDICINE

## 2021-11-16 PROCEDURE — 6360000002 HC RX W HCPCS: Performed by: INTERNAL MEDICINE

## 2021-11-16 PROCEDURE — G8420 CALC BMI NORM PARAMETERS: HCPCS | Performed by: INTERNAL MEDICINE

## 2021-11-16 PROCEDURE — 96411 CHEMO IV PUSH ADDL DRUG: CPT

## 2021-11-16 RX ORDER — PALONOSETRON HYDROCHLORIDE 0.05 MG/ML
0.25 INJECTION, SOLUTION INTRAVENOUS ONCE
Status: COMPLETED | OUTPATIENT
Start: 2021-11-16 | End: 2021-11-16

## 2021-11-16 RX ORDER — SODIUM CHLORIDE 9 MG/ML
INJECTION, SOLUTION INTRAVENOUS ONCE
Status: COMPLETED | OUTPATIENT
Start: 2021-11-16 | End: 2021-11-16

## 2021-11-16 RX ORDER — DEXTROSE MONOHYDRATE 50 MG/ML
250 INJECTION, SOLUTION INTRAVENOUS ONCE
Status: COMPLETED | OUTPATIENT
Start: 2021-11-16 | End: 2021-11-16

## 2021-11-16 RX ORDER — FLUOROURACIL 50 MG/ML
400 INJECTION, SOLUTION INTRAVENOUS ONCE
Status: COMPLETED | OUTPATIENT
Start: 2021-11-16 | End: 2021-11-16

## 2021-11-16 RX ORDER — DEXAMETHASONE SODIUM PHOSPHATE 10 MG/ML
10 INJECTION INTRAMUSCULAR; INTRAVENOUS ONCE
Status: COMPLETED | OUTPATIENT
Start: 2021-11-16 | End: 2021-11-16

## 2021-11-16 RX ADMIN — LEUCOVORIN CALCIUM 700 MG: 500 INJECTION, POWDER, LYOPHILIZED, FOR SOLUTION INTRAMUSCULAR; INTRAVENOUS at 10:40

## 2021-11-16 RX ADMIN — DEXAMETHASONE SODIUM PHOSPHATE 10 MG: 10 INJECTION INTRAMUSCULAR; INTRAVENOUS at 09:26

## 2021-11-16 RX ADMIN — SODIUM CHLORIDE: 9 INJECTION, SOLUTION INTRAVENOUS at 09:25

## 2021-11-16 RX ADMIN — PALONOSETRON 0.25 MG: 0.25 INJECTION, SOLUTION INTRAVENOUS at 09:25

## 2021-11-16 RX ADMIN — DEXTROSE MONOHYDRATE 250 ML: 50 INJECTION, SOLUTION INTRAVENOUS at 10:35

## 2021-11-16 RX ADMIN — OXALIPLATIN 145 MG: 5 INJECTION, SOLUTION INTRAVENOUS at 10:35

## 2021-11-16 RX ADMIN — SODIUM CHLORIDE 300 MG: 9 INJECTION, SOLUTION INTRAVENOUS at 09:40

## 2021-11-16 RX ADMIN — FLUOROURACIL 700 MG: 50 INJECTION, SOLUTION INTRAVENOUS at 13:02

## 2021-11-16 NOTE — PROGRESS NOTES
11/16/2021. Today 11/16/2021. No fever, chills. No N/V. Fair appetite and energy level. Review of Systems;  CONSTITUTIONAL: No fever, chills. Fair appetite and energy level. ENMT: Eyes: No diplopia; Nose: No epistaxis. Mouth: No sore throat. Oral sores  RESPIRATORY: No hemoptysis, shortness of breath, cough. CARDIOVASCULAR: No chest pain, palpitations. GASTROINTESTINAL: No N.V, abdominal pain  GENITOURINARY: No dysuria, urinary frequency, hematuria. NEURO: No syncope, presyncope, headache. Remainder:ROS NEGATIVE    Past Medical History:      Diagnosis Date    Al[facility]olic ([facility]     Anxiety     Cancer ([facility]     Chronic right shoulder pain     Convulsions/seizures ([facility] 09/16/2011    Depression     Depression 12/14/2020    GERD (gastroesophageal reflux disease)     Headache     HTN (hypertension) 06/16/2015    Mixed hyperlipidemia 02/17/2016    Osteoarthritis     Psychiatric problem     Substance abuse ([facility])     methadone     Medications:  Reviewed and reconciled. Allergies:  No Known Allergies    Physical Exam:  BP (!) 146/83   Pulse 76   Temp 97.7 °F (36.5 °C)   Ht 5' 3.5\" (1.613 m)   Wt 132 lb 4.8 oz (60 kg)   LMP 09/08/2011   SpO2 98%   BMI 23.07 kg/m²   GENERAL: Alert, oriented x 3, not in distress  HEENT: PERRLA; EOMI. EXTREMITIES: Without clubbing, cyanosis, or edema. NEUROLOGIC: No focal deficits.    ECOG PS 1    Lab Results   Component Value Date    WBC 6.9 11/15/2021    HGB 10.9 (L) 11/15/2021    HCT 32.5 (L) 11/15/2021    MCV 92.1 11/15/2021     11/15/2021     Lab Results   Component Value Date     11/15/2021    K 3.0 (L) 11/15/2021     11/15/2021    CO2 27 11/15/2021    BUN 6 11/15/2021    CREATININE 0.5 11/15/2021    GLUCOSE 119 (H) 11/15/2021    CALCIUM 8.7 11/15/2021    PROT 6.7 11/15/2021    LABALBU 3.7 11/15/2021    BILITOT 0.2 11/15/2021    ALKPHOS 140 (H) 11/15/2021    AST 19 11/15/2021    ALT 11 11/15/2021    LABGLOM >60 11/15/2021    GFRAA >60 11/15/2021     Lab Results   Component Value Date    MG 1.6 11/15/2021     Lab Results   Component Value Date    CEA 11.8 (H) 11/08/2021     Impression/Plan:  61 y/o female with clinical stage: [id number] Rectosigmoid Cancer  CT abdomen/pelvis 06/16/2021:  Circumferentially infiltrative mass in the upper rectum, consistent with malignancy  Metastatic lymph nodes in the adjacent mesorectum, as well as the precaval region of the upper abdomen. Multiple prominent liver metastases    CTA chest 06/16/2021 noted no evidence of PE.  7 mm nodule seen within the right lower lobe posteriorly worrisome for metastatic disease. No region of intrathoracic lymphadenopathy is identified    Flex sigmoidoscopy 06/29/2021:  Nearly complete obstructive rectal mass at 15cms from AV  Colon, rectum, biopsy - Colonic mucosa with high-grade dysplasia; see comment. COMMENT   Additional deeper levels were also examined    MRI Rectum on 06/29/2021:  7.7 cm rectosigmoid mass spanning and invading the peritoneal reflection with superior rectal lymphadenopathy. Stage: T4a N2  MRF: Clear (tumor margin >2 mm from MRF)   Sphincter involvement: No.   Suspicious extra mesorectal lymph nodes: No    CEA 25.4 on 07/14/2021    Laparoscopic loop sigmoid colostomy and liver biopsy 07/15/2021  Liver, biopsy - Adenocarcinoma, morphologically compatible with colorectal primary  NGS testing ([employer])   TMB 5Muts/Mb; No therapies or clinical trials  MSI-High Not detected  PTEN: Therapies with clinical benefit in tumor type: None  Therapies with clinical benefit in other tumor type: None    Case discussed with Dr. Shayla Vega [facility]  Systemic chemotherapy consists of FOLFOX + Avastin. Side effects reviewed. She agreed to proceed. Mediport placed  Cycle # 1 FOLFOX + Avastin was on 08/17/2021. Was in ED 08/30/2021 for hypoK+ and HypoMg but left AMA  Cycle # 2 FOLFOX + Avastin was on 09/07/2021. CEA 16.4 on 09/07/2021.   Cycle # 3 FOLFOX + Avastin was on 09/21/2021. CEA 13.8 on 09/20/2021. Cycle # 4 FOLFOX + Avastin was on 10/12/2021. CEA 12.3 on 10/11/2021. Cycle # 5 FOLFOX + Avastin was on 10/26/2021. CEA 11.7 on 10/25/2021. Cycle # 6 FOLFOX + Avastin is today 11/16/2021. She is taking oral K+.     RTC 2-3  weeks with prior scans at [address], [place]   11/16/2021

## 2021-11-18 ENCOUNTER — HOSPITAL ENCOUNTER (OUTPATIENT)
Dept: INFUSION THERAPY | Age: 61
Discharge: HOME | End: 2021-11-18
Payer: COMMERCIAL

## 2021-11-18 DIAGNOSIS — C20 RECTAL CANCER (HCC): ICD-10-CM

## 2021-11-18 DIAGNOSIS — I87.8 POOR VENOUS ACCESS: Primary | ICD-10-CM

## 2021-11-18 PROCEDURE — 6360000002 HC RX W HCPCS: Performed by: NURSE PRACTITIONER

## 2021-11-18 PROCEDURE — 99214 OFFICE O/P EST MOD 30 MIN: CPT

## 2021-11-18 PROCEDURE — 2580000003 HC RX 258: Performed by: NURSE PRACTITIONER

## 2021-11-18 RX ORDER — HEPARIN SODIUM (PORCINE) LOCK FLUSH IV SOLN 100 UNIT/ML 100 UNIT/ML
500 SOLUTION INTRAVENOUS PRN
Status: CANCELLED | OUTPATIENT
Start: 2021-11-18

## 2021-11-18 RX ORDER — SODIUM CHLORIDE 9 MG/ML
25 INJECTION, SOLUTION INTRAVENOUS PRN
Status: CANCELLED | OUTPATIENT
Start: 2021-11-18

## 2021-11-18 RX ORDER — SODIUM CHLORIDE 0.9 % (FLUSH) 0.9 %
5-40 SYRINGE (ML) INJECTION PRN
Status: DISCONTINUED | OUTPATIENT
Start: 2021-11-18 | End: 2021-11-19 | Stop reason: HOSPADM

## 2021-11-18 RX ORDER — SODIUM CHLORIDE 0.9 % (FLUSH) 0.9 %
5-40 SYRINGE (ML) INJECTION PRN
Status: CANCELLED | OUTPATIENT
Start: 2021-11-18

## 2021-11-18 RX ORDER — HEPARIN SODIUM (PORCINE) LOCK FLUSH IV SOLN 100 UNIT/ML 100 UNIT/ML
500 SOLUTION INTRAVENOUS PRN
Status: DISCONTINUED | OUTPATIENT
Start: 2021-11-18 | End: 2021-11-19 | Stop reason: HOSPADM

## 2021-11-18 RX ADMIN — Medication 500 UNITS: at 13:33

## 2021-11-18 RX ADMIN — SODIUM CHLORIDE, PRESERVATIVE FREE 10 ML: 5 INJECTION INTRAVENOUS at 13:32

## 2021-11-19 DIAGNOSIS — C18.9 ADENOCARCINOMA OF COLON (HCC): ICD-10-CM

## 2021-11-19 RX ORDER — OXYCODONE HYDROCHLORIDE 10 MG/1
10 TABLET ORAL EVERY 6 HOURS PRN
Qty: 120 TABLET | Refills: 0 | Status: SHIPPED
Start: 2021-11-19 | End: 2021-12-16 | Stop reason: SDUPTHER

## 2021-12-06 ENCOUNTER — HOSPITAL ENCOUNTER (OUTPATIENT)
Dept: INFUSION THERAPY | Age: 61
Discharge: HOME | End: 2021-12-06
Payer: COMMERCIAL

## 2021-12-06 DIAGNOSIS — C20 RECTAL CANCER (HCC): Primary | ICD-10-CM

## 2021-12-06 DIAGNOSIS — I87.8 POOR VENOUS ACCESS: ICD-10-CM

## 2021-12-06 LAB
ALBUMIN SERPL-MCNC: 3.9 G/DL (ref 3.5–5.2)
ALP BLD-CCNC: 128 U/L (ref 35–104)
ALT SERPL-CCNC: 6 U/L (ref 0–32)
ANION GAP SERPL CALCULATED.3IONS-SCNC: 12 MMOL/L (ref 7–16)
AST SERPL-CCNC: 16 U/L (ref 0–31)
BASOPHILS ABSOLUTE: 0.05 E9/L (ref 0–0.2)
BASOPHILS RELATIVE PERCENT: 0.9 % (ref 0–2)
BILIRUB SERPL-MCNC: 0.3 MG/DL (ref 0–1.2)
BUN BLDV-MCNC: 13 MG/DL (ref 6–23)
CALCIUM SERPL-MCNC: 9 MG/DL (ref 8.6–10.2)
CHLORIDE BLD-SCNC: 103 MMOL/L (ref 98–107)
CO2: 26 MMOL/L (ref 22–29)
CREAT SERPL-MCNC: 0.5 MG/DL (ref 0.5–1)
EOSINOPHILS ABSOLUTE: 0.04 E9/L (ref 0.05–0.5)
EOSINOPHILS RELATIVE PERCENT: 0.7 % (ref 0–6)
GFR AFRICAN AMERICAN: >60
GFR NON-AFRICAN AMERICAN: >60 ML/MIN/1.73
GLUCOSE BLD-MCNC: 120 MG/DL (ref 74–99)
HCT VFR BLD CALC: 34.6 % (ref 34–48)
HEMOGLOBIN: 11.7 G/DL (ref 11.5–15.5)
IMMATURE GRANULOCYTES #: 0.04 E9/L
IMMATURE GRANULOCYTES %: 0.7 % (ref 0–5)
LYMPHOCYTES ABSOLUTE: 2.53 E9/L (ref 1.5–4)
LYMPHOCYTES RELATIVE PERCENT: 47.1 % (ref 20–42)
MAGNESIUM: 2.2 MG/DL (ref 1.6–2.6)
MCH RBC QN AUTO: 33.2 PG (ref 26–35)
MCHC RBC AUTO-ENTMCNC: 33.8 % (ref 32–34.5)
MCV RBC AUTO: 98.3 FL (ref 80–99.9)
MONOCYTES ABSOLUTE: 0.88 E9/L (ref 0.1–0.95)
MONOCYTES RELATIVE PERCENT: 16.4 % (ref 2–12)
NEUTROPHILS ABSOLUTE: 1.83 E9/L (ref 1.8–7.3)
NEUTROPHILS RELATIVE PERCENT: 34.2 % (ref 43–80)
PDW BLD-RTO: 16.6 FL (ref 11.5–15)
PLATELET # BLD: 215 E9/L (ref 130–450)
PMV BLD AUTO: 9.9 FL (ref 7–12)
POTASSIUM SERPL-SCNC: 3.9 MMOL/L (ref 3.5–5)
RBC # BLD: 3.52 E12/L (ref 3.5–5.5)
SODIUM BLD-SCNC: 141 MMOL/L (ref 132–146)
TOTAL PROTEIN: 6.8 G/DL (ref 6.4–8.3)
WBC # BLD: 5.4 E9/L (ref 4.5–11.5)

## 2021-12-06 PROCEDURE — 36591 DRAW BLOOD OFF VENOUS DEVICE: CPT

## 2021-12-06 PROCEDURE — 6360000002 HC RX W HCPCS: Performed by: NURSE PRACTITIONER

## 2021-12-06 PROCEDURE — 80053 COMPREHEN METABOLIC PANEL: CPT

## 2021-12-06 PROCEDURE — 85025 COMPLETE CBC W/AUTO DIFF WBC: CPT

## 2021-12-06 PROCEDURE — 2580000003 HC RX 258: Performed by: NURSE PRACTITIONER

## 2021-12-06 PROCEDURE — 83735 ASSAY OF MAGNESIUM: CPT

## 2021-12-06 RX ORDER — SODIUM CHLORIDE 0.9 % (FLUSH) 0.9 %
5-40 SYRINGE (ML) INJECTION PRN
Status: DISCONTINUED | OUTPATIENT
Start: 2021-12-06 | End: 2021-12-07 | Stop reason: HOSPADM

## 2021-12-06 RX ORDER — SODIUM CHLORIDE 9 MG/ML
25 INJECTION, SOLUTION INTRAVENOUS PRN
Status: CANCELLED | OUTPATIENT
Start: 2021-12-06

## 2021-12-06 RX ORDER — HEPARIN SODIUM (PORCINE) LOCK FLUSH IV SOLN 100 UNIT/ML 100 UNIT/ML
500 SOLUTION INTRAVENOUS PRN
Status: DISCONTINUED | OUTPATIENT
Start: 2021-12-06 | End: 2021-12-07 | Stop reason: HOSPADM

## 2021-12-06 RX ORDER — SODIUM CHLORIDE 0.9 % (FLUSH) 0.9 %
5-40 SYRINGE (ML) INJECTION PRN
Status: CANCELLED | OUTPATIENT
Start: 2021-12-06

## 2021-12-06 RX ORDER — HEPARIN SODIUM (PORCINE) LOCK FLUSH IV SOLN 100 UNIT/ML 100 UNIT/ML
500 SOLUTION INTRAVENOUS PRN
Status: CANCELLED | OUTPATIENT
Start: 2021-12-06

## 2021-12-06 RX ADMIN — SODIUM CHLORIDE, PRESERVATIVE FREE 10 ML: 5 INJECTION INTRAVENOUS at 11:22

## 2021-12-06 RX ADMIN — Medication 500 UNITS: at 11:22

## 2021-12-06 NOTE — PROGRESS NOTES
Patient presents to clinic for labs today. Right chest  SQ port accessed per policy using 20 G .75 inch Iglesias needle for good blood return. Aspirate for waste and specimen sent to lab. Site flushed easily with 10 mL NSS followed by 5 mL Heparin solution 100 units/ml rinse prior to de-access. Dry sterile dressing to area. Tolerated procedure well. Encouraged to schedule port flush every 4 weeks.

## 2021-12-07 ENCOUNTER — OFFICE VISIT (OUTPATIENT)
Dept: ONCOLOGY | Age: 61
End: 2021-12-07
Payer: COMMERCIAL

## 2021-12-07 VITALS
DIASTOLIC BLOOD PRESSURE: 86 MMHG | TEMPERATURE: 98.7 F | HEART RATE: 72 BPM | HEIGHT: 64 IN | BODY MASS INDEX: 22.57 KG/M2 | WEIGHT: 132.2 LBS | OXYGEN SATURATION: 99 % | SYSTOLIC BLOOD PRESSURE: 148 MMHG

## 2021-12-07 DIAGNOSIS — C20 RECTAL CANCER (HCC): Primary | ICD-10-CM

## 2021-12-07 PROCEDURE — G8420 CALC BMI NORM PARAMETERS: HCPCS | Performed by: INTERNAL MEDICINE

## 2021-12-07 PROCEDURE — 3017F COLORECTAL CA SCREEN DOC REV: CPT | Performed by: INTERNAL MEDICINE

## 2021-12-07 PROCEDURE — 99214 OFFICE O/P EST MOD 30 MIN: CPT | Performed by: INTERNAL MEDICINE

## 2021-12-07 PROCEDURE — 4004F PT TOBACCO SCREEN RCVD TLK: CPT | Performed by: INTERNAL MEDICINE

## 2021-12-07 PROCEDURE — G8484 FLU IMMUNIZE NO ADMIN: HCPCS | Performed by: INTERNAL MEDICINE

## 2021-12-07 PROCEDURE — G8427 DOCREV CUR MEDS BY ELIG CLIN: HCPCS | Performed by: INTERNAL MEDICINE

## 2021-12-07 PROCEDURE — 99212 OFFICE O/P EST SF 10 MIN: CPT

## 2021-12-07 RX ORDER — PALONOSETRON HYDROCHLORIDE 0.05 MG/ML
0.25 INJECTION, SOLUTION INTRAVENOUS ONCE
Status: CANCELLED | OUTPATIENT
Start: 2021-12-08

## 2021-12-07 RX ORDER — SODIUM CHLORIDE 0.9 % (FLUSH) 0.9 %
5-40 SYRINGE (ML) INJECTION PRN
Status: CANCELLED | OUTPATIENT
Start: 2021-12-08

## 2021-12-07 RX ORDER — DIPHENHYDRAMINE HYDROCHLORIDE 50 MG/ML
50 INJECTION INTRAMUSCULAR; INTRAVENOUS ONCE
Status: CANCELLED | OUTPATIENT
Start: 2021-12-08 | End: 2021-12-08

## 2021-12-07 RX ORDER — SODIUM CHLORIDE 9 MG/ML
INJECTION, SOLUTION INTRAVENOUS ONCE
Status: CANCELLED | OUTPATIENT
Start: 2021-12-08 | End: 2021-12-08

## 2021-12-07 RX ORDER — HEPARIN SODIUM (PORCINE) LOCK FLUSH IV SOLN 100 UNIT/ML 100 UNIT/ML
500 SOLUTION INTRAVENOUS PRN
Status: CANCELLED | OUTPATIENT
Start: 2021-12-08

## 2021-12-07 RX ORDER — MEPERIDINE HYDROCHLORIDE 25 MG/ML
12.5 INJECTION INTRAMUSCULAR; INTRAVENOUS; SUBCUTANEOUS ONCE
Status: CANCELLED | OUTPATIENT
Start: 2021-12-08 | End: 2021-12-08

## 2021-12-07 RX ORDER — EPINEPHRINE 1 MG/ML
0.3 INJECTION, SOLUTION, CONCENTRATE INTRAVENOUS PRN
Status: CANCELLED | OUTPATIENT
Start: 2021-12-08

## 2021-12-07 RX ORDER — FLUOROURACIL 50 MG/ML
400 INJECTION, SOLUTION INTRAVENOUS ONCE
Status: CANCELLED | OUTPATIENT
Start: 2021-12-08

## 2021-12-07 RX ORDER — DEXAMETHASONE SODIUM PHOSPHATE 10 MG/ML
10 INJECTION, SOLUTION INTRAMUSCULAR; INTRAVENOUS ONCE
Status: CANCELLED | OUTPATIENT
Start: 2021-12-08

## 2021-12-07 RX ORDER — SODIUM CHLORIDE 9 MG/ML
INJECTION, SOLUTION INTRAVENOUS CONTINUOUS
Status: CANCELLED | OUTPATIENT
Start: 2021-12-08

## 2021-12-07 RX ORDER — SODIUM CHLORIDE 9 MG/ML
25 INJECTION, SOLUTION INTRAVENOUS PRN
Status: CANCELLED | OUTPATIENT
Start: 2021-12-08

## 2021-12-07 RX ORDER — METHYLPREDNISOLONE SODIUM SUCCINATE 125 MG/2ML
125 INJECTION, POWDER, LYOPHILIZED, FOR SOLUTION INTRAMUSCULAR; INTRAVENOUS ONCE
Status: CANCELLED | OUTPATIENT
Start: 2021-12-08 | End: 2021-12-08

## 2021-12-07 RX ORDER — DEXTROSE MONOHYDRATE 50 MG/ML
INJECTION, SOLUTION INTRAVENOUS ONCE
Status: CANCELLED | OUTPATIENT
Start: 2021-12-08 | End: 2021-12-08

## 2021-12-07 NOTE — PROGRESS NOTES
Department of [place] [address]   Attending Clinic Note    Reason for Visit: Follow-up on a patient with Rectal cancer    PCP:  Gulshan Bryant MD    History of Present Illness:  61 y/o female who was complaining of diarrhea blood in stool and painful defecation    CT abdomen/pelvis 06/16/2021:  Circumferentially infiltrative mass in the upper rectum, consistent with malignancy  Metastatic lymph nodes in the adjacent mesorectum, as well as the precaval region of the upper abdomen. Multiple prominent liver metastases    CTA chest 06/16/2021 noted no evidence of PE.  7 mm nodule seen within the right lower lobe posteriorly worrisome for metastatic disease. No region of intrathoracic lymphadenopathy is identified    Flex sigmoidoscopy 06/29/2021:  Nearly complete obstructive rectal mass at 15cms from AV  Colon, rectum, biopsy - Colonic mucosa with high-grade dysplasia; see comment. COMMENT   Additional deeper levels were also examined    MRI Rectum on 06/29/2021:  7.7 cm rectosigmoid mass spanning and invading the peritoneal reflection with superior rectal lymphadenopathy. Stage: T4a N2  MRF: Clear (tumor margin >2 mm from MRF)   Sphincter involvement: No.   Suspicious extra mesorectal lymph nodes: No    CEA 25.4 on 07/14/2021    Laparoscopic loop sigmoid colostomy and liver biopsy 07/15/2021  Liver, biopsy - Adenocarcinoma, morphologically compatible with colorectal primary  NGS testing ([address] [facility]) ordered and pending    Case discussed with Dr. Zach Forbes [facility]  Systemic chemotherapy consists of FOLFOX + Avastin. Side effects reviewed. She agreed to proceed. Mediport placed  Cycle # 1 FOLFOX + Avastin was on 08/17/2021  Was in ED 08/30/2021 for hypoK+ and HypoMg but left AMA  Cycle # 2 FOLFOX + Avastin was on 09/07/2021. Cycle # 3 FOLFOX + Avastin was on 09/21/2021. Cycle # 4 FOLFOX + Avastin was on 10/12/2021. Cycle # 5 FOLFOX + Avastin was on 10/26/2021.    Cycle # 6 FOLFOX + Avastin was on Results   Component Value Date    MG 2.2 12/06/2021     Lab Results   Component Value Date    CEA 11.8 (H) 11/08/2021     Impression/Plan:  63 y/o female with clinical stage: [id number] Rectosigmoid Cancer  CT abdomen/pelvis 06/16/2021:  Circumferentially infiltrative mass in the upper rectum, consistent with malignancy  Metastatic lymph nodes in the adjacent mesorectum, as well as the precaval region of the upper abdomen. Multiple prominent liver metastases    CTA chest 06/16/2021 noted no evidence of PE.  7 mm nodule seen within the right lower lobe posteriorly worrisome for metastatic disease. No region of intrathoracic lymphadenopathy is identified    Flex sigmoidoscopy 06/29/2021:  Nearly complete obstructive rectal mass at 15cms from AV  Colon, rectum, biopsy - Colonic mucosa with high-grade dysplasia; see comment. COMMENT   Additional deeper levels were also examined    MRI Rectum on 06/29/2021:  7.7 cm rectosigmoid mass spanning and invading the peritoneal reflection with superior rectal lymphadenopathy. Stage: T4a N2  MRF: Clear (tumor margin >2 mm from MRF)   Sphincter involvement: No.   Suspicious extra mesorectal lymph nodes: No    CEA 25.4 on 07/14/2021    Laparoscopic loop sigmoid colostomy and liver biopsy 07/15/2021  Liver, biopsy - Adenocarcinoma, morphologically compatible with colorectal primary  NGS testing ([employer])   TMB 5Muts/Mb; No therapies or clinical trials  MSI-High Not detected  PTEN: Therapies with clinical benefit in tumor type: None  Therapies with clinical benefit in other tumor type: None    Case discussed with Dr. Andrew Wolff [facility]  Systemic chemotherapy consists of FOLFOX + Avastin. Side effects reviewed. She agreed to proceed. Mediport placed  Cycle # 1 FOLFOX + Avastin was on 08/17/2021. Was in ED 08/30/2021 for hypoK+ and HypoMg but left AMA  Cycle # 2 FOLFOX + Avastin was on 09/07/2021. CEA 16.4 on 09/07/2021. Cycle # 3 FOLFOX + Avastin was on 09/21/2021.   CEA 13.8 on 09/20/2021. Cycle # 4 FOLFOX + Avastin was on 10/12/2021. CEA 12.3 on 10/11/2021. Cycle # 5 FOLFOX + Avastin was on 10/26/2021. CEA 11.7 on 10/25/2021. Cycle # 6 FOLFOX + Avastin was on 11/16/2021. CEA 11.8 on 11/08/2021  Cycle # 7 FOLFOX + Avastin is tomorrow 12/08/2021. CEA pending.      RTC after scans completion      Christal Jackson MD   12/7/2021

## 2021-12-08 ENCOUNTER — HOSPITAL ENCOUNTER (OUTPATIENT)
Dept: INFUSION THERAPY | Age: 61
Discharge: HOME | End: 2021-12-08
Payer: COMMERCIAL

## 2021-12-08 VITALS — HEART RATE: 64 BPM | DIASTOLIC BLOOD PRESSURE: 73 MMHG | SYSTOLIC BLOOD PRESSURE: 151 MMHG

## 2021-12-08 DIAGNOSIS — C20 RECTAL CANCER (HCC): Primary | ICD-10-CM

## 2021-12-08 LAB — CEA: 9.2 NG/ML (ref 0–5.2)

## 2021-12-08 PROCEDURE — 96413 CHEMO IV INFUSION 1 HR: CPT

## 2021-12-08 PROCEDURE — 82378 CARCINOEMBRYONIC ANTIGEN: CPT

## 2021-12-08 PROCEDURE — 2580000003 HC RX 258: Performed by: INTERNAL MEDICINE

## 2021-12-08 PROCEDURE — 6360000002 HC RX W HCPCS: Performed by: INTERNAL MEDICINE

## 2021-12-08 PROCEDURE — 96411 CHEMO IV PUSH ADDL DRUG: CPT

## 2021-12-08 PROCEDURE — 96415 CHEMO IV INFUSION ADDL HR: CPT

## 2021-12-08 PROCEDURE — 96416 CHEMO PROLONG INFUSE W/PUMP: CPT

## 2021-12-08 PROCEDURE — 96375 TX/PRO/DX INJ NEW DRUG ADDON: CPT

## 2021-12-08 PROCEDURE — 96368 THER/DIAG CONCURRENT INF: CPT

## 2021-12-08 PROCEDURE — 96417 CHEMO IV INFUS EACH ADDL SEQ: CPT

## 2021-12-08 RX ORDER — SODIUM CHLORIDE 0.9 % (FLUSH) 0.9 %
5-40 SYRINGE (ML) INJECTION PRN
Status: DISCONTINUED | OUTPATIENT
Start: 2021-12-08 | End: 2021-12-09 | Stop reason: HOSPADM

## 2021-12-08 RX ORDER — PALONOSETRON HYDROCHLORIDE 0.05 MG/ML
0.25 INJECTION, SOLUTION INTRAVENOUS ONCE
Status: COMPLETED | OUTPATIENT
Start: 2021-12-08 | End: 2021-12-08

## 2021-12-08 RX ORDER — DEXTROSE MONOHYDRATE 50 MG/ML
250 INJECTION, SOLUTION INTRAVENOUS ONCE
Status: COMPLETED | OUTPATIENT
Start: 2021-12-08 | End: 2021-12-08

## 2021-12-08 RX ORDER — FLUOROURACIL 50 MG/ML
400 INJECTION, SOLUTION INTRAVENOUS ONCE
Status: COMPLETED | OUTPATIENT
Start: 2021-12-08 | End: 2021-12-08

## 2021-12-08 RX ORDER — SODIUM CHLORIDE 9 MG/ML
INJECTION, SOLUTION INTRAVENOUS ONCE
Status: COMPLETED | OUTPATIENT
Start: 2021-12-08 | End: 2021-12-08

## 2021-12-08 RX ORDER — DEXAMETHASONE SODIUM PHOSPHATE 10 MG/ML
10 INJECTION INTRAMUSCULAR; INTRAVENOUS ONCE
Status: COMPLETED | OUTPATIENT
Start: 2021-12-08 | End: 2021-12-08

## 2021-12-08 RX ADMIN — DEXAMETHASONE SODIUM PHOSPHATE 10 MG: 10 INJECTION INTRAMUSCULAR; INTRAVENOUS at 08:59

## 2021-12-08 RX ADMIN — DEXTROSE MONOHYDRATE 250 ML: 50 INJECTION, SOLUTION INTRAVENOUS at 10:01

## 2021-12-08 RX ADMIN — SODIUM CHLORIDE, PRESERVATIVE FREE 10 ML: 5 INJECTION INTRAVENOUS at 08:57

## 2021-12-08 RX ADMIN — OXALIPLATIN 145 MG: 5 INJECTION, SOLUTION INTRAVENOUS at 10:02

## 2021-12-08 RX ADMIN — SODIUM CHLORIDE: 9 INJECTION, SOLUTION INTRAVENOUS at 08:57

## 2021-12-08 RX ADMIN — FLUOROURACIL 700 MG: 50 INJECTION, SOLUTION INTRAVENOUS at 12:22

## 2021-12-08 RX ADMIN — LEUCOVORIN CALCIUM 700 MG: 350 INJECTION, POWDER, LYOPHILIZED, FOR SOLUTION INTRAMUSCULAR; INTRAVENOUS at 10:01

## 2021-12-08 RX ADMIN — SODIUM CHLORIDE 300 MG: 9 INJECTION, SOLUTION INTRAVENOUS at 09:22

## 2021-12-08 RX ADMIN — PALONOSETRON 0.25 MG: 0.25 INJECTION, SOLUTION INTRAVENOUS at 08:58

## 2021-12-10 ENCOUNTER — HOSPITAL ENCOUNTER (OUTPATIENT)
Dept: INFUSION THERAPY | Age: 61
Discharge: HOME | End: 2021-12-10
Payer: COMMERCIAL

## 2021-12-10 DIAGNOSIS — C20 RECTAL CANCER (HCC): ICD-10-CM

## 2021-12-10 DIAGNOSIS — I87.8 POOR VENOUS ACCESS: Primary | ICD-10-CM

## 2021-12-10 PROCEDURE — 6360000002 HC RX W HCPCS: Performed by: NURSE PRACTITIONER

## 2021-12-10 PROCEDURE — 99214 OFFICE O/P EST MOD 30 MIN: CPT

## 2021-12-10 PROCEDURE — 2580000003 HC RX 258: Performed by: NURSE PRACTITIONER

## 2021-12-10 RX ORDER — SODIUM CHLORIDE 0.9 % (FLUSH) 0.9 %
5-40 SYRINGE (ML) INJECTION PRN
Status: CANCELLED | OUTPATIENT
Start: 2021-12-10

## 2021-12-10 RX ORDER — HEPARIN SODIUM (PORCINE) LOCK FLUSH IV SOLN 100 UNIT/ML 100 UNIT/ML
500 SOLUTION INTRAVENOUS PRN
Status: DISCONTINUED | OUTPATIENT
Start: 2021-12-10 | End: 2021-12-11 | Stop reason: HOSPADM

## 2021-12-10 RX ORDER — SODIUM CHLORIDE 9 MG/ML
25 INJECTION, SOLUTION INTRAVENOUS PRN
Status: CANCELLED | OUTPATIENT
Start: 2021-12-10

## 2021-12-10 RX ORDER — SODIUM CHLORIDE 0.9 % (FLUSH) 0.9 %
5-40 SYRINGE (ML) INJECTION PRN
Status: DISCONTINUED | OUTPATIENT
Start: 2021-12-10 | End: 2021-12-11 | Stop reason: HOSPADM

## 2021-12-10 RX ORDER — HEPARIN SODIUM (PORCINE) LOCK FLUSH IV SOLN 100 UNIT/ML 100 UNIT/ML
500 SOLUTION INTRAVENOUS PRN
Status: CANCELLED | OUTPATIENT
Start: 2021-12-10

## 2021-12-10 RX ADMIN — SODIUM CHLORIDE, PRESERVATIVE FREE 10 ML: 5 INJECTION INTRAVENOUS at 11:18

## 2021-12-10 RX ADMIN — Medication 500 UNITS: at 11:18

## 2021-12-10 NOTE — PROGRESS NOTES
Presents to clinic for CADD pump removal. Port site appears normal. Denies problems/concerns. Received 253 ml of 5-FU & reservoir 7 of 5-FU. Port flushed with 10 ml. NSS followed by 5 ml. Heparin Rinse prior to de access. DSD to area. Tolerated well. Encouraged to call clinic with questions/concerns.

## 2021-12-16 DIAGNOSIS — C18.9 ADENOCARCINOMA OF COLON (HCC): ICD-10-CM

## 2021-12-16 RX ORDER — OXYCODONE HYDROCHLORIDE 10 MG/1
10 TABLET ORAL EVERY 6 HOURS PRN
Qty: 120 TABLET | Refills: 0 | Status: SHIPPED
Start: 2021-12-16 | End: 2022-01-14 | Stop reason: SDUPTHER

## 2021-12-16 NOTE — TELEPHONE ENCOUNTER
Controlled Substance Monitoring:    Acute and Chronic Pain Monitoring:   RX Monitoring 12/16/2021   Acute Pain Prescriptions -   Periodic Controlled Substance Monitoring Possible medication side effects, risk of tolerance/dependence & alternative treatments discussed. ;No signs of potential drug abuse or diversion identified. ;Assessed functional status. ;Obtaining appropriate analgesic effect of treatment.    Chronic Pain > 50 MEDD -

## 2021-12-16 NOTE — TELEPHONE ENCOUNTER
Last Appointment:  9/15/2021  Future Appointments   Date Time Provider Dandre Onofre   1/17/2022 10:40 AM [facility] EXAM ROOM 1 MEDICAL [facility] MED ONC Margaux Cowan   1/18/2022  9:15 AM Kaycee Carrington MD [facility]   1/18/2022  9:30  [address] Margaux Cowan

## 2022-01-14 DIAGNOSIS — C18.9 ADENOCARCINOMA OF COLON (HCC): ICD-10-CM

## 2022-01-14 DIAGNOSIS — C20 RECTAL CANCER (HCC): Primary | ICD-10-CM

## 2022-01-14 RX ORDER — OXYCODONE HYDROCHLORIDE 10 MG/1
10 TABLET ORAL EVERY 6 HOURS PRN
Qty: 120 TABLET | Refills: 0 | Status: SHIPPED
Start: 2022-01-14 | End: 2022-02-15 | Stop reason: SDUPTHER

## 2022-01-14 NOTE — TELEPHONE ENCOUNTER
Controlled Substance Monitoring:    Acute and Chronic Pain Monitoring:   RX Monitoring 1/14/2022   Acute Pain Prescriptions -   Periodic Controlled Substance Monitoring Possible medication side effects, risk of tolerance/dependence & alternative treatments discussed. ;No signs of potential drug abuse or diversion identified. ;Assessed functional status. ;Obtaining appropriate analgesic effect of treatment.    Chronic Pain > 50 MEDD -

## 2022-01-14 NOTE — TELEPHONE ENCOUNTER
Last Appointment:  9/15/2021  Future Appointments   Date Time Provider Dandre Onofre   1/17/2022 10:40 AM [facility] EXAM ROOM 1 [facility]   1/18/2022  9:15 AM Baltazar Rinne, MD [facility]   1/18/2022  9:30  [address] [facility]

## 2022-01-17 ENCOUNTER — HOSPITAL ENCOUNTER (OUTPATIENT)
Dept: INFUSION THERAPY | Age: 62
End: 2022-01-17

## 2022-01-18 ENCOUNTER — OFFICE VISIT (OUTPATIENT)
Dept: ONCOLOGY | Age: 62
End: 2022-01-18
Payer: COMMERCIAL

## 2022-01-18 ENCOUNTER — HOSPITAL ENCOUNTER (OUTPATIENT)
Dept: INFUSION THERAPY | Age: 62
Discharge: HOME | End: 2022-01-18
Payer: COMMERCIAL

## 2022-01-18 VITALS
BODY MASS INDEX: 22.65 KG/M2 | SYSTOLIC BLOOD PRESSURE: 125 MMHG | DIASTOLIC BLOOD PRESSURE: 81 MMHG | HEART RATE: 93 BPM | WEIGHT: 132.7 LBS | HEIGHT: 64 IN | TEMPERATURE: 97.7 F | OXYGEN SATURATION: 100 %

## 2022-01-18 VITALS
DIASTOLIC BLOOD PRESSURE: 69 MMHG | SYSTOLIC BLOOD PRESSURE: 131 MMHG | RESPIRATION RATE: 18 BRPM | TEMPERATURE: 97.3 F | HEART RATE: 97 BPM

## 2022-01-18 DIAGNOSIS — C20 RECTAL CANCER (HCC): Primary | ICD-10-CM

## 2022-01-18 LAB
ALBUMIN SERPL-MCNC: 4.5 G/DL (ref 3.5–5.2)
ALP BLD-CCNC: 152 U/L (ref 35–104)
ALT SERPL-CCNC: 11 U/L (ref 0–32)
ANION GAP SERPL CALCULATED.3IONS-SCNC: 14 MMOL/L (ref 7–16)
AST SERPL-CCNC: 16 U/L (ref 0–31)
BASOPHILS ABSOLUTE: 0.05 E9/L (ref 0–0.2)
BASOPHILS RELATIVE PERCENT: 0.4 % (ref 0–2)
BILIRUB SERPL-MCNC: 0.4 MG/DL (ref 0–1.2)
BUN BLDV-MCNC: 13 MG/DL (ref 6–23)
CALCIUM SERPL-MCNC: 9.4 MG/DL (ref 8.6–10.2)
CEA: 7.9 NG/ML (ref 0–5.2)
CHLORIDE BLD-SCNC: 99 MMOL/L (ref 98–107)
CO2: 24 MMOL/L (ref 22–29)
CREAT SERPL-MCNC: 0.7 MG/DL (ref 0.5–1)
EOSINOPHILS ABSOLUTE: 0.27 E9/L (ref 0.05–0.5)
EOSINOPHILS RELATIVE PERCENT: 2.4 % (ref 0–6)
GFR AFRICAN AMERICAN: >60
GFR NON-AFRICAN AMERICAN: >60 ML/MIN/1.73
GLUCOSE BLD-MCNC: 95 MG/DL (ref 74–99)
HCT VFR BLD CALC: 35.9 % (ref 34–48)
HEMOGLOBIN: 11.9 G/DL (ref 11.5–15.5)
IMMATURE GRANULOCYTES #: 0.02 E9/L
IMMATURE GRANULOCYTES %: 0.2 % (ref 0–5)
LYMPHOCYTES ABSOLUTE: 4.56 E9/L (ref 1.5–4)
LYMPHOCYTES RELATIVE PERCENT: 39.8 % (ref 20–42)
MAGNESIUM: 1.8 MG/DL (ref 1.6–2.6)
MCH RBC QN AUTO: 34.4 PG (ref 26–35)
MCHC RBC AUTO-ENTMCNC: 33.1 % (ref 32–34.5)
MCV RBC AUTO: 103.8 FL (ref 80–99.9)
MONOCYTES ABSOLUTE: 0.81 E9/L (ref 0.1–0.95)
MONOCYTES RELATIVE PERCENT: 7.1 % (ref 2–12)
NEUTROPHILS ABSOLUTE: 5.75 E9/L (ref 1.8–7.3)
NEUTROPHILS RELATIVE PERCENT: 50.1 % (ref 43–80)
PDW BLD-RTO: 12.5 FL (ref 11.5–15)
PLATELET # BLD: 198 E9/L (ref 130–450)
PMV BLD AUTO: 9.5 FL (ref 7–12)
POTASSIUM SERPL-SCNC: 3.9 MMOL/L (ref 3.5–5)
RBC # BLD: 3.46 E12/L (ref 3.5–5.5)
SODIUM BLD-SCNC: 137 MMOL/L (ref 132–146)
TOTAL PROTEIN: 7.5 G/DL (ref 6.4–8.3)
WBC # BLD: 11.5 E9/L (ref 4.5–11.5)

## 2022-01-18 PROCEDURE — 85025 COMPLETE CBC W/AUTO DIFF WBC: CPT

## 2022-01-18 PROCEDURE — G8420 CALC BMI NORM PARAMETERS: HCPCS | Performed by: INTERNAL MEDICINE

## 2022-01-18 PROCEDURE — 96375 TX/PRO/DX INJ NEW DRUG ADDON: CPT

## 2022-01-18 PROCEDURE — 99214 OFFICE O/P EST MOD 30 MIN: CPT | Performed by: INTERNAL MEDICINE

## 2022-01-18 PROCEDURE — 82378 CARCINOEMBRYONIC ANTIGEN: CPT

## 2022-01-18 PROCEDURE — 3017F COLORECTAL CA SCREEN DOC REV: CPT | Performed by: INTERNAL MEDICINE

## 2022-01-18 PROCEDURE — 96368 THER/DIAG CONCURRENT INF: CPT

## 2022-01-18 PROCEDURE — 96411 CHEMO IV PUSH ADDL DRUG: CPT

## 2022-01-18 PROCEDURE — G8427 DOCREV CUR MEDS BY ELIG CLIN: HCPCS | Performed by: INTERNAL MEDICINE

## 2022-01-18 PROCEDURE — 80053 COMPREHEN METABOLIC PANEL: CPT

## 2022-01-18 PROCEDURE — 96417 CHEMO IV INFUS EACH ADDL SEQ: CPT

## 2022-01-18 PROCEDURE — 2580000003 HC RX 258: Performed by: NURSE PRACTITIONER

## 2022-01-18 PROCEDURE — G8484 FLU IMMUNIZE NO ADMIN: HCPCS | Performed by: INTERNAL MEDICINE

## 2022-01-18 PROCEDURE — 96416 CHEMO PROLONG INFUSE W/PUMP: CPT

## 2022-01-18 PROCEDURE — 96413 CHEMO IV INFUSION 1 HR: CPT

## 2022-01-18 PROCEDURE — 83735 ASSAY OF MAGNESIUM: CPT

## 2022-01-18 PROCEDURE — 6360000002 HC RX W HCPCS: Performed by: NURSE PRACTITIONER

## 2022-01-18 PROCEDURE — 96415 CHEMO IV INFUSION ADDL HR: CPT

## 2022-01-18 PROCEDURE — 4004F PT TOBACCO SCREEN RCVD TLK: CPT | Performed by: INTERNAL MEDICINE

## 2022-01-18 RX ORDER — PALONOSETRON HYDROCHLORIDE 0.05 MG/ML
0.25 INJECTION, SOLUTION INTRAVENOUS ONCE
Status: COMPLETED | OUTPATIENT
Start: 2022-01-18 | End: 2022-01-18

## 2022-01-18 RX ORDER — HEPARIN SODIUM (PORCINE) LOCK FLUSH IV SOLN 100 UNIT/ML 100 UNIT/ML
500 SOLUTION INTRAVENOUS PRN
Status: CANCELLED | OUTPATIENT
Start: 2022-01-18

## 2022-01-18 RX ORDER — SODIUM CHLORIDE 9 MG/ML
25 INJECTION, SOLUTION INTRAVENOUS PRN
Status: CANCELLED | OUTPATIENT
Start: 2022-01-18

## 2022-01-18 RX ORDER — SODIUM CHLORIDE 9 MG/ML
INJECTION, SOLUTION INTRAVENOUS CONTINUOUS
Status: CANCELLED | OUTPATIENT
Start: 2022-01-18

## 2022-01-18 RX ORDER — SODIUM CHLORIDE 0.9 % (FLUSH) 0.9 %
5-40 SYRINGE (ML) INJECTION PRN
Status: CANCELLED | OUTPATIENT
Start: 2022-01-18

## 2022-01-18 RX ORDER — SODIUM CHLORIDE 9 MG/ML
INJECTION, SOLUTION INTRAVENOUS ONCE
Status: COMPLETED | OUTPATIENT
Start: 2022-01-18 | End: 2022-01-18

## 2022-01-18 RX ORDER — DEXAMETHASONE SODIUM PHOSPHATE 10 MG/ML
10 INJECTION INTRAMUSCULAR; INTRAVENOUS ONCE
Status: CANCELLED | OUTPATIENT
Start: 2022-01-18

## 2022-01-18 RX ORDER — DEXAMETHASONE SODIUM PHOSPHATE 10 MG/ML
10 INJECTION INTRAMUSCULAR; INTRAVENOUS ONCE
Status: COMPLETED | OUTPATIENT
Start: 2022-01-18 | End: 2022-01-18

## 2022-01-18 RX ORDER — MEPERIDINE HYDROCHLORIDE 50 MG/ML
12.5 INJECTION INTRAMUSCULAR; INTRAVENOUS; SUBCUTANEOUS ONCE
Status: CANCELLED | OUTPATIENT
Start: 2022-01-18 | End: 2022-01-18

## 2022-01-18 RX ORDER — DEXTROSE MONOHYDRATE 50 MG/ML
INJECTION, SOLUTION INTRAVENOUS ONCE
Status: CANCELLED | OUTPATIENT
Start: 2022-01-18 | End: 2022-01-18

## 2022-01-18 RX ORDER — FLUOROURACIL 50 MG/ML
400 INJECTION, SOLUTION INTRAVENOUS ONCE
Status: COMPLETED | OUTPATIENT
Start: 2022-01-18 | End: 2022-01-18

## 2022-01-18 RX ORDER — MEPERIDINE HYDROCHLORIDE 25 MG/ML
12.5 INJECTION INTRAMUSCULAR; INTRAVENOUS; SUBCUTANEOUS ONCE
Status: CANCELLED | OUTPATIENT
Start: 2022-01-18 | End: 2022-01-18

## 2022-01-18 RX ORDER — FLUOROURACIL 50 MG/ML
400 INJECTION, SOLUTION INTRAVENOUS ONCE
Status: CANCELLED | OUTPATIENT
Start: 2022-01-18

## 2022-01-18 RX ORDER — PALONOSETRON 0.05 MG/ML
0.25 INJECTION, SOLUTION INTRAVENOUS ONCE
Status: CANCELLED | OUTPATIENT
Start: 2022-01-18

## 2022-01-18 RX ORDER — DIPHENHYDRAMINE HYDROCHLORIDE 50 MG/ML
50 INJECTION INTRAMUSCULAR; INTRAVENOUS ONCE
Status: CANCELLED | OUTPATIENT
Start: 2022-01-18 | End: 2022-01-18

## 2022-01-18 RX ORDER — SODIUM CHLORIDE 0.9 % (FLUSH) 0.9 %
5-40 SYRINGE (ML) INJECTION PRN
Status: DISCONTINUED | OUTPATIENT
Start: 2022-01-18 | End: 2022-01-19 | Stop reason: HOSPADM

## 2022-01-18 RX ORDER — SODIUM CHLORIDE 9 MG/ML
INJECTION, SOLUTION INTRAVENOUS ONCE
Status: CANCELLED | OUTPATIENT
Start: 2022-01-18 | End: 2022-01-18

## 2022-01-18 RX ORDER — METHYLPREDNISOLONE SODIUM SUCCINATE 125 MG/2ML
125 INJECTION, POWDER, LYOPHILIZED, FOR SOLUTION INTRAMUSCULAR; INTRAVENOUS ONCE
Status: CANCELLED | OUTPATIENT
Start: 2022-01-18 | End: 2022-01-18

## 2022-01-18 RX ORDER — EPINEPHRINE 1 MG/ML
0.3 INJECTION, SOLUTION, CONCENTRATE INTRAVENOUS PRN
Status: CANCELLED | OUTPATIENT
Start: 2022-01-18

## 2022-01-18 RX ORDER — DEXTROSE MONOHYDRATE 50 MG/ML
250 INJECTION, SOLUTION INTRAVENOUS ONCE
Status: COMPLETED | OUTPATIENT
Start: 2022-01-18 | End: 2022-01-18

## 2022-01-18 RX ORDER — PALONOSETRON HYDROCHLORIDE 0.05 MG/ML
0.25 INJECTION, SOLUTION INTRAVENOUS ONCE
Status: CANCELLED | OUTPATIENT
Start: 2022-01-18

## 2022-01-18 RX ORDER — HEPARIN SODIUM (PORCINE) LOCK FLUSH IV SOLN 100 UNIT/ML 100 UNIT/ML
500 SOLUTION INTRAVENOUS PRN
Status: DISCONTINUED | OUTPATIENT
Start: 2022-01-18 | End: 2022-01-19 | Stop reason: HOSPADM

## 2022-01-18 RX ADMIN — LEUCOVORIN CALCIUM 700 MG: 350 INJECTION, POWDER, LYOPHILIZED, FOR SOLUTION INTRAMUSCULAR; INTRAVENOUS at 11:02

## 2022-01-18 RX ADMIN — SODIUM CHLORIDE, PRESERVATIVE FREE 10 ML: 5 INJECTION INTRAVENOUS at 10:59

## 2022-01-18 RX ADMIN — DEXAMETHASONE SODIUM PHOSPHATE 10 MG: 10 INJECTION INTRAMUSCULAR; INTRAVENOUS at 09:38

## 2022-01-18 RX ADMIN — SODIUM CHLORIDE 300 MG: 9 INJECTION, SOLUTION INTRAVENOUS at 10:00

## 2022-01-18 RX ADMIN — FLUOROURACIL 700 MG: 50 INJECTION, SOLUTION INTRAVENOUS at 13:25

## 2022-01-18 RX ADMIN — OXALIPLATIN 145 MG: 5 INJECTION, SOLUTION, CONCENTRATE INTRAVENOUS at 11:02

## 2022-01-18 RX ADMIN — PALONOSETRON 0.25 MG: 0.25 INJECTION, SOLUTION INTRAVENOUS at 09:37

## 2022-01-18 RX ADMIN — DEXTROSE MONOHYDRATE 250 ML: 50 INJECTION, SOLUTION INTRAVENOUS at 10:58

## 2022-01-18 RX ADMIN — SODIUM CHLORIDE: 9 INJECTION, SOLUTION INTRAVENOUS at 09:36

## 2022-01-18 NOTE — PROGRESS NOTES
Department of [place] [address]     Attending Clinic Note    Reason for Visit: Follow-up on a patient with Rectal cancer    PCP:  Janeen Savage MD    History of Present Illness:  61 y/o female who was complaining of diarrhea blood in stool and painful defecation    CT abdomen/pelvis 06/16/2021:  Circumferentially infiltrative mass in the upper rectum, consistent with malignancy  Metastatic lymph nodes in the adjacent mesorectum, as well as the precaval region of the upper abdomen. Multiple prominent liver metastases    CTA chest 06/16/2021 noted no evidence of PE.  7 mm nodule seen within the right lower lobe posteriorly worrisome for metastatic disease. No region of intrathoracic lymphadenopathy is identified    Flex sigmoidoscopy 06/29/2021:  Nearly complete obstructive rectal mass at 15cms from AV  Colon, rectum, biopsy - Colonic mucosa with high-grade dysplasia; see comment. COMMENT   Additional deeper levels were also examined    MRI Rectum on 06/29/2021:  7.7 cm rectosigmoid mass spanning and invading the peritoneal reflection with superior rectal lymphadenopathy. Stage: T4a N2  MRF: Clear (tumor margin >2 mm from MRF)   Sphincter involvement: No.   Suspicious extra mesorectal lymph nodes: No    CEA 25.4 on 07/14/2021    Laparoscopic loop sigmoid colostomy and liver biopsy 07/15/2021  Liver, biopsy - Adenocarcinoma, morphologically compatible with colorectal primary  NGS testing ([facility]) ordered and pending    Case discussed with Dr. Natan Gomez [facility]  Systemic chemotherapy consists of FOLFOX + Avastin. Side effects reviewed. She agreed to proceed. Mediport placed  Cycle # 1 FOLFOX + Avastin was on 08/17/2021  Was in ED 08/30/2021 for hypoK+ and HypoMg but left AMA  Cycle # 2 FOLFOX + Avastin was on 09/07/2021. Cycle # 3 FOLFOX + Avastin was on 09/21/2021. Cycle # 4 FOLFOX + Avastin was on 10/12/2021. Cycle # 5 FOLFOX + Avastin was on 10/26/2021.    Cycle # 6 FOLFOX + Avastin was on 11/16/2021. Cycle # 7 FOLFOX + Avastin was on 12/08/2021. CT chest 01/03/2022 New mild right upper lobe and left upper lobe reticulonodular opacities and ground glass opacities, most likely infectious/inflammatory in etiology.  Consider follow-up to complete resolution. Additional nodular opacities measuring up to 7 mm, stable since 6/16/21.  This may also be evaluated at interval follow-up. Small hiatal hernia. CT abdomen/pelvis 01/03/2022 decrease in size of previous sigmoid colon mass and multifocal LN. Multifocal hepatic metastasis mildly decreased in size. Flexible sigmoidoscopy 01/11/2022: The perianal and digital rectal examinations were normal.   An infiltrative completely obstructing mass was found in the recto-sigmoid colon. The mass was circumferential. No bleeding was present. Seen by Dr. Flex Langford on 01/11/2022:   Proceed with Cycle # 8 FOLFOX 01/18/2022  MRI Rectum 01/24/2022  Tumor Board following MRI    Today 01/18/2022. No fever, chills. No N/V. Fair appetite and energy level. Review of Systems;  CONSTITUTIONAL: No fever, chills. Fair appetite and energy level. ENMT: Eyes: No diplopia; Nose: No epistaxis. Mouth: No sore throat. RESPIRATORY: No hemoptysis, shortness of breath, cough. CARDIOVASCULAR: No chest pain, palpitations. GASTROINTESTINAL: No N.V, abdominal pain  GENITOURINARY: No dysuria, urinary frequency, hematuria. NEURO: No syncope, presyncope, headache. Remainder:ROS NEGATIVE    Past Medical History:      Diagnosis Date    Alcoholic ([facility]     Anxiety     Cancer ([facility]     Chronic right shoulder pain     Convulsions/seizures ([facility] [address] 09/16/2011    Depression     Depression 12/14/2020    GERD (gastroesophageal reflux disease)     Headache     HTN (hypertension) 06/16/2015    Mixed hyperlipidemia 02/17/2016    Osteoarthritis     Psychiatric problem     Substance abuse (HCC)     methadone     Medications:  Reviewed and reconciled.     Allergies:  No Known Allergies    Physical Exam:  /81   Pulse 93   Temp 97.7 °F (36.5 °C)   Ht 5' 3.5\" (1.613 m)   Wt 132 lb 11.2 oz (60.2 kg)   LMP 09/08/2011   SpO2 100%   BMI 23.14 kg/m²   GENERAL: Alert, oriented x 3, not in distress  HEENT: PERRLA; EOMI. EXTREMITIES: Without clubbing, cyanosis, or edema. NEUROLOGIC: No focal deficits. ECOG PS 1    Lab Results   Component Value Date    WBC 11.5 01/18/2022    HGB 11.9 01/18/2022    HCT 35.9 01/18/2022    .8 (H) 01/18/2022     01/18/2022     Lab Results   Component Value Date     01/18/2022    K 3.9 01/18/2022    CL 99 01/18/2022    CO2 24 01/18/2022    BUN 13 01/18/2022    CREATININE 0.7 01/18/2022    GLUCOSE 95 01/18/2022    CALCIUM 9.4 01/18/2022    PROT 7.5 01/18/2022    LABALBU 4.5 01/18/2022    BILITOT 0.4 01/18/2022    ALKPHOS 152 (H) 01/18/2022    AST 16 01/18/2022    ALT 11 01/18/2022    LABGLOM >60 01/18/2022    GFRAA >60 01/18/2022     Impression/Plan:  61 y/o female with clinical stage: [id number] Rectosigmoid Cancer  CT abdomen/pelvis 06/16/2021:  Circumferentially infiltrative mass in the upper rectum, consistent with malignancy  Metastatic lymph nodes in the adjacent mesorectum, as well as the precaval region of the upper abdomen. Multiple prominent liver metastases    CTA chest 06/16/2021 noted no evidence of PE.  7 mm nodule seen within the right lower lobe posteriorly worrisome for metastatic disease. No region of intrathoracic lymphadenopathy is identified    Flex sigmoidoscopy 06/29/2021:  Nearly complete obstructive rectal mass at 15cms from AV  Colon, rectum, biopsy - Colonic mucosa with high-grade dysplasia; see comment. COMMENT   Additional deeper levels were also examined    MRI Rectum on 06/29/2021:  7.7 cm rectosigmoid mass spanning and invading the peritoneal reflection with superior rectal lymphadenopathy.    Stage: T4a N2  MRF: Clear (tumor margin >2 mm from MRF)   Sphincter involvement: No.   Suspicious extra mesorectal lymph nodes: No    CEA 25.4 on 07/14/2021    Laparoscopic loop sigmoid colostomy and liver biopsy 07/15/2021  Liver, biopsy - Adenocarcinoma, morphologically compatible with colorectal primary  NGS testing ([employer])   TMB 5Muts/Mb; No therapies or clinical trials  MSI-High Not detected  PTEN: Therapies with clinical benefit in tumor type: None  Therapies with clinical benefit in other tumor type: None    Case discussed with Dr. Katerina Zamora F  Systemic chemotherapy consists of FOLFOX + Avastin. Side effects reviewed. She agreed to proceed. Mediport placed  Cycle # 1 FOLFOX + Avastin was on 08/17/2021. Was in ED 08/30/2021 for hypoK+ and HypoMg but left AMA  Cycle # 2 FOLFOX + Avastin was on 09/07/2021. CEA 16.4 on 09/07/2021. Cycle # 3 FOLFOX + Avastin was on 09/21/2021. CEA 13.8 on 09/20/2021. Cycle # 4 FOLFOX + Avastin was on 10/12/2021. CEA 12.3 on 10/11/2021. Cycle # 5 FOLFOX + Avastin was on 10/26/2021. CEA 11.7 on 10/25/2021. Cycle # 6 FOLFOX + Avastin was on 11/16/2021. CEA 11.8 on 11/08/2021  Cycle # 7 FOLFOX + Avastin was on 12/08/2021. CEA 9.2 on 12/08/2021. CT chest 01/03/2022 New mild right upper lobe and left upper lobe reticulonodular opacities and ground glass opacities, most likely infectious/inflammatory in etiology.  Consider follow-up to complete resolution. Additional nodular opacities measuring up to 7 mm, stable since 6/16/21.  This may also be evaluated at interval follow-up. Small hiatal hernia. CT abdomen/pelvis 01/03/2022 decrease in size of previous sigmoid colon mass and multifocal LN. Multifocal hepatic metastasis mildly decreased in size. Flexible sigmoidoscopy 01/11/2022: The perianal and digital rectal examinations were normal.   An infiltrative completely obstructing mass was found in the recto-sigmoid colon. The mass was circumferential. No bleeding was present.      Seen by Dr. Danitza Singh on 01/11/2022:   Proceed with Cycle # 8 FOLFOX 01/18/2022  MRI Rectum 01/24/2022  Tumor Board following MRI    RTC 2 weeks     Kranthi Brown MD   1/18/2022

## 2022-01-20 ENCOUNTER — HOSPITAL ENCOUNTER (OUTPATIENT)
Dept: INFUSION THERAPY | Age: 62
Discharge: HOME | End: 2022-01-20
Payer: COMMERCIAL

## 2022-01-20 DIAGNOSIS — I87.8 POOR VENOUS ACCESS: Primary | ICD-10-CM

## 2022-01-20 DIAGNOSIS — C20 RECTAL CANCER (HCC): ICD-10-CM

## 2022-01-20 PROCEDURE — 2580000003 HC RX 258: Performed by: NURSE PRACTITIONER

## 2022-01-20 PROCEDURE — 6360000002 HC RX W HCPCS: Performed by: NURSE PRACTITIONER

## 2022-01-20 PROCEDURE — 99214 OFFICE O/P EST MOD 30 MIN: CPT

## 2022-01-20 RX ORDER — SODIUM CHLORIDE 0.9 % (FLUSH) 0.9 %
5-40 SYRINGE (ML) INJECTION PRN
Status: DISCONTINUED | OUTPATIENT
Start: 2022-01-20 | End: 2022-01-21 | Stop reason: HOSPADM

## 2022-01-20 RX ORDER — SODIUM CHLORIDE 9 MG/ML
25 INJECTION, SOLUTION INTRAVENOUS PRN
Status: CANCELLED | OUTPATIENT
Start: 2022-01-20

## 2022-01-20 RX ORDER — HEPARIN SODIUM (PORCINE) LOCK FLUSH IV SOLN 100 UNIT/ML 100 UNIT/ML
500 SOLUTION INTRAVENOUS PRN
Status: CANCELLED | OUTPATIENT
Start: 2022-01-20

## 2022-01-20 RX ORDER — SODIUM CHLORIDE 0.9 % (FLUSH) 0.9 %
5-40 SYRINGE (ML) INJECTION PRN
Status: CANCELLED | OUTPATIENT
Start: 2022-01-20

## 2022-01-20 RX ORDER — HEPARIN SODIUM (PORCINE) LOCK FLUSH IV SOLN 100 UNIT/ML 100 UNIT/ML
500 SOLUTION INTRAVENOUS PRN
Status: DISCONTINUED | OUTPATIENT
Start: 2022-01-20 | End: 2022-01-21 | Stop reason: HOSPADM

## 2022-01-20 RX ADMIN — HEPARIN 500 UNITS: 100 SYRINGE at 12:35

## 2022-01-20 RX ADMIN — Medication 10 ML: at 12:35

## 2022-01-24 ENCOUNTER — TELEPHONE (OUTPATIENT)
Dept: CASE MANAGEMENT | Age: 62
End: 2022-01-24

## 2022-01-24 NOTE — TELEPHONE ENCOUNTER
Returned patient's phone call from this weekend. LM instructing patient to return phone call if further assistance is needed. Flori Vasquez, KARLAW, RN, OCN  Oncology Nurse Navigator

## 2022-01-31 ENCOUNTER — HOSPITAL ENCOUNTER (OUTPATIENT)
Dept: INFUSION THERAPY | Age: 62
Discharge: HOME | End: 2022-01-31
Payer: COMMERCIAL

## 2022-01-31 DIAGNOSIS — I87.8 POOR VENOUS ACCESS: ICD-10-CM

## 2022-01-31 DIAGNOSIS — C20 RECTAL CANCER (HCC): Primary | ICD-10-CM

## 2022-01-31 LAB
ALBUMIN SERPL-MCNC: 4 G/DL (ref 3.5–5.2)
ALP BLD-CCNC: 123 U/L (ref 35–104)
ALT SERPL-CCNC: 8 U/L (ref 0–32)
ANION GAP SERPL CALCULATED.3IONS-SCNC: 12 MMOL/L (ref 7–16)
AST SERPL-CCNC: 15 U/L (ref 0–31)
BASOPHILS ABSOLUTE: 0.01 E9/L (ref 0–0.2)
BASOPHILS RELATIVE PERCENT: 0.2 % (ref 0–2)
BILIRUB SERPL-MCNC: 0.3 MG/DL (ref 0–1.2)
BUN BLDV-MCNC: 17 MG/DL (ref 6–23)
CALCIUM SERPL-MCNC: 9.5 MG/DL (ref 8.6–10.2)
CEA: 9.9 NG/ML (ref 0–5.2)
CHLORIDE BLD-SCNC: 103 MMOL/L (ref 98–107)
CO2: 25 MMOL/L (ref 22–29)
CREAT SERPL-MCNC: 0.5 MG/DL (ref 0.5–1)
EOSINOPHILS ABSOLUTE: 0.05 E9/L (ref 0.05–0.5)
EOSINOPHILS RELATIVE PERCENT: 1.1 % (ref 0–6)
GFR AFRICAN AMERICAN: >60
GFR NON-AFRICAN AMERICAN: >60 ML/MIN/1.73
GLUCOSE BLD-MCNC: 100 MG/DL (ref 74–99)
HCT VFR BLD CALC: 34.6 % (ref 34–48)
HEMOGLOBIN: 11.7 G/DL (ref 11.5–15.5)
IMMATURE GRANULOCYTES #: 0.02 E9/L
IMMATURE GRANULOCYTES %: 0.4 % (ref 0–5)
LYMPHOCYTES ABSOLUTE: 2.16 E9/L (ref 1.5–4)
LYMPHOCYTES RELATIVE PERCENT: 48.5 % (ref 20–42)
MAGNESIUM: 2 MG/DL (ref 1.6–2.6)
MCH RBC QN AUTO: 34.4 PG (ref 26–35)
MCHC RBC AUTO-ENTMCNC: 33.8 % (ref 32–34.5)
MCV RBC AUTO: 101.8 FL (ref 80–99.9)
MONOCYTES ABSOLUTE: 0.38 E9/L (ref 0.1–0.95)
MONOCYTES RELATIVE PERCENT: 8.5 % (ref 2–12)
NEUTROPHILS ABSOLUTE: 1.83 E9/L (ref 1.8–7.3)
NEUTROPHILS RELATIVE PERCENT: 41.3 % (ref 43–80)
PDW BLD-RTO: 12.8 FL (ref 11.5–15)
PLATELET # BLD: 127 E9/L (ref 130–450)
PMV BLD AUTO: 10.1 FL (ref 7–12)
POTASSIUM SERPL-SCNC: 3.6 MMOL/L (ref 3.5–5)
RBC # BLD: 3.4 E12/L (ref 3.5–5.5)
SODIUM BLD-SCNC: 140 MMOL/L (ref 132–146)
TOTAL PROTEIN: 7.3 G/DL (ref 6.4–8.3)
WBC # BLD: 4.5 E9/L (ref 4.5–11.5)

## 2022-01-31 PROCEDURE — 83735 ASSAY OF MAGNESIUM: CPT

## 2022-01-31 PROCEDURE — 80053 COMPREHEN METABOLIC PANEL: CPT

## 2022-01-31 PROCEDURE — 36591 DRAW BLOOD OFF VENOUS DEVICE: CPT

## 2022-01-31 PROCEDURE — 6360000002 HC RX W HCPCS: Performed by: NURSE PRACTITIONER

## 2022-01-31 PROCEDURE — 85025 COMPLETE CBC W/AUTO DIFF WBC: CPT

## 2022-01-31 PROCEDURE — 2580000003 HC RX 258: Performed by: NURSE PRACTITIONER

## 2022-01-31 PROCEDURE — 82378 CARCINOEMBRYONIC ANTIGEN: CPT

## 2022-01-31 RX ORDER — HEPARIN SODIUM (PORCINE) LOCK FLUSH IV SOLN 100 UNIT/ML 100 UNIT/ML
500 SOLUTION INTRAVENOUS PRN
Status: DISCONTINUED | OUTPATIENT
Start: 2022-01-31 | End: 2022-02-01 | Stop reason: HOSPADM

## 2022-01-31 RX ORDER — HEPARIN SODIUM (PORCINE) LOCK FLUSH IV SOLN 100 UNIT/ML 100 UNIT/ML
500 SOLUTION INTRAVENOUS PRN
Status: CANCELLED | OUTPATIENT
Start: 2022-01-31

## 2022-01-31 RX ORDER — SODIUM CHLORIDE 0.9 % (FLUSH) 0.9 %
5-40 SYRINGE (ML) INJECTION PRN
Status: DISCONTINUED | OUTPATIENT
Start: 2022-01-31 | End: 2022-02-01 | Stop reason: HOSPADM

## 2022-01-31 RX ORDER — SODIUM CHLORIDE 0.9 % (FLUSH) 0.9 %
5-40 SYRINGE (ML) INJECTION PRN
Status: CANCELLED | OUTPATIENT
Start: 2022-01-31

## 2022-01-31 RX ORDER — SODIUM CHLORIDE 9 MG/ML
25 INJECTION, SOLUTION INTRAVENOUS PRN
Status: CANCELLED | OUTPATIENT
Start: 2022-01-31

## 2022-01-31 RX ADMIN — SODIUM CHLORIDE, PRESERVATIVE FREE 10 ML: 5 INJECTION INTRAVENOUS at 09:38

## 2022-01-31 RX ADMIN — Medication 500 UNITS: at 09:38

## 2022-01-31 NOTE — PROGRESS NOTES
PORT FLUSH    Patient presents to clinic for [facility] [place] 2[facility] today. single  SQ port accessed per policy using 81V, . 75 inch Iglesias needle for good blood return. Aspirate for waste and specimen sent to lab. Site flushed easily with 10 mL NSS followed by 5 mL Heparin solution 100 units/ml rinse prior to de-access. Dry sterile dressing to area. Tolerated procedure well. Encouraged to schedule port flush every 4 weeks.

## 2022-01-31 NOTE — PROGRESS NOTES
Department of [place] [address]     Attending Clinic Note    Reason for Visit: Follow-up on a patient with Rectal cancer    PCP:  Manuela Frankel MD    History of Present Illness:  63 y/o female who was complaining of diarrhea blood in stool and painful defecation    CT abdomen/pelvis 06/16/2021:  Circumferentially infiltrative mass in the upper rectum, consistent with malignancy  Metastatic lymph nodes in the adjacent mesorectum, as well as the precaval region of the upper abdomen. Multiple prominent liver metastases    CTA chest 06/16/2021 noted no evidence of PE.  7 mm nodule seen within the right lower lobe posteriorly worrisome for metastatic disease. No region of intrathoracic lymphadenopathy is identified    Flex sigmoidoscopy 06/29/2021:  Nearly complete obstructive rectal mass at 15cms from AV  Colon, rectum, biopsy - Colonic mucosa with high-grade dysplasia; see comment. COMMENT   Additional deeper levels were also examined    MRI Rectum on 06/29/2021:  7.7 cm rectosigmoid mass spanning and invading the peritoneal reflection with superior rectal lymphadenopathy. Stage: T4a N2  MRF: Clear (tumor margin >2 mm from MRF)   Sphincter involvement: No.   Suspicious extra mesorectal lymph nodes: No    CEA 25.4 on 07/14/2021    Laparoscopic loop sigmoid colostomy and liver biopsy 07/15/2021  Liver, biopsy - Adenocarcinoma, morphologically compatible with colorectal primary  NGS testing ([address] [facility]) ordered and pending    Case discussed with Dr. Anna Centeno [facility]  Systemic chemotherapy consists of FOLFOX + Avastin. Side effects reviewed. She agreed to proceed. Mediport placed  Cycle # 1 FOLFOX + Avastin was on 08/17/2021  Was in ED 08/30/2021 for hypoK+ and HypoMg but left AMA  Cycle # 2 FOLFOX + Avastin was on 09/07/2021. Cycle # 3 FOLFOX + Avastin was on 09/21/2021. Cycle # 4 FOLFOX + Avastin was on 10/12/2021. Cycle # 5 FOLFOX + Avastin was on 10/26/2021.    Cycle # 6 FOLFOX + Avastin was on 11/16/2021. Cycle # 7 FOLFOX + Avastin was on 12/08/2021. CT chest 01/03/2022 New mild right upper lobe and left upper lobe reticulonodular opacities and ground glass opacities, most likely infectious/inflammatory in etiology.  Consider follow-up to complete resolution. Additional nodular opacities measuring up to 7 mm, stable since 6/16/21.  This may also be evaluated at interval follow-up. Small hiatal hernia. CT abdomen/pelvis 01/03/2022 decrease in size of previous sigmoid colon mass and multifocal LN. Multifocal hepatic metastasis mildly decreased in size. Flexible sigmoidoscopy 01/11/2022: The perianal and digital rectal examinations were normal.   An infiltrative completely obstructing mass was found in the recto-sigmoid colon. The mass was circumferential. No bleeding was present. Seen by Dr. Jabari Hill on 01/11/2022:   Proceed with Cycle # 8 FOLFOX 01/18/2022  MRI Rectum 01/24/2022  Posttreatment MR STAGE: T4a N1   Since 06/29/2021, post treatment primary tumor assessment:      Incomplete response (likely residual tumor). mrTRG: Grade 3 - Moderate response   Suspicious Mesorectal lymph nodes: Yes, superior rectal lymph node that has decreased in size. Suspicious Extramesorectal lymph nodes: No    Today 02/01/2022. No fever, chills. No N/V. Fair appetite and energy level. Review of Systems;  CONSTITUTIONAL: No fever, chills. Fair appetite and energy level. ENMT: Eyes: No diplopia; Nose: No epistaxis. Mouth: No sore throat. RESPIRATORY: No hemoptysis, shortness of breath, cough. CARDIOVASCULAR: No chest pain, palpitations. GASTROINTESTINAL: No N.V, abdominal pain  GENITOURINARY: No dysuria, urinary frequency, hematuria. NEURO: No syncope, presyncope, headache.   Remainder:ROS NEGATIVE    Past Medical History:      Diagnosis Date    Alcoholic ([facility] [address]     Anxiety     Cancer ([facility] [facility]     Chronic right shoulder pain     Convulsions/seizures ([facility] [address] 09/16/2011    Depression     Depression 12/14/2020    GERD (gastroesophageal reflux disease)     Headache     HTN (hypertension) 06/16/2015    Mixed hyperlipidemia 02/17/2016    Osteoarthritis     Psychiatric problem     Substance abuse (HCC)     methadone     Medications:  Reviewed and reconciled. Allergies:  No Known Allergies    Physical Exam:  /73   Pulse 69   Temp 96.9 °F (36.1 °C)   Ht 5' 3\" (1.6 m)   Wt 130 lb 12.8 oz (59.3 kg)   LMP 09/08/2011   SpO2 98%   BMI 23.17 kg/m²   GENERAL: Alert, oriented x 3, not in distress  HEENT: PERRLA; EOMI. EXTREMITIES: Without clubbing, cyanosis, or edema. NEUROLOGIC: No focal deficits. ECOG PS 1    Lab Results   Component Value Date    WBC 4.5 01/31/2022    HGB 11.7 01/31/2022    HCT 34.6 01/31/2022    .8 (H) 01/31/2022     (L) 01/31/2022     Lab Results   Component Value Date     01/31/2022    K 3.6 01/31/2022     01/31/2022    CO2 25 01/31/2022    BUN 17 01/31/2022    CREATININE 0.5 01/31/2022    GLUCOSE 100 (H) 01/31/2022    CALCIUM 9.5 01/31/2022    PROT 7.3 01/31/2022    LABALBU 4.0 01/31/2022    BILITOT 0.3 01/31/2022    ALKPHOS 123 (H) 01/31/2022    AST 15 01/31/2022    ALT 8 01/31/2022    LABGLOM >60 01/31/2022    GFRAA >60 01/31/2022     Lab Results   Component Value Date    MG 2.0 01/31/2022     Lab Results   Component Value Date    CEA 9.9 (H) 01/31/2022     Impression/Plan:  61 y/o female with clinical stage: [id number] Rectosigmoid Cancer  CT abdomen/pelvis 06/16/2021:  Circumferentially infiltrative mass in the upper rectum, consistent with malignancy  Metastatic lymph nodes in the adjacent mesorectum, as well as the precaval region of the upper abdomen. Multiple prominent liver metastases    CTA chest 06/16/2021 noted no evidence of PE.  7 mm nodule seen within the right lower lobe posteriorly worrisome for metastatic disease.   No region of intrathoracic lymphadenopathy is identified    Flex sigmoidoscopy 06/29/2021:  Nearly complete obstructive rectal mass at 15cms from AV  Colon, rectum, biopsy - Colonic mucosa with high-grade dysplasia; see comment. COMMENT   Additional deeper levels were also examined    MRI Rectum on 06/29/2021:  7.7 cm rectosigmoid mass spanning and invading the peritoneal reflection with superior rectal lymphadenopathy. Stage: T4a N2  MRF: Clear (tumor margin >2 mm from MRF)   Sphincter involvement: No.   Suspicious extra mesorectal lymph nodes: No    CEA 25.4 on 07/14/2021    Laparoscopic loop sigmoid colostomy and liver biopsy 07/15/2021  Liver, biopsy - Adenocarcinoma, morphologically compatible with colorectal primary  NGS testing ([employer])   TMB 5Muts/Mb; No therapies or clinical trials  MSI-High Not detected  PTEN: Therapies with clinical benefit in tumor type: None  Therapies with clinical benefit in other tumor type: None    Case discussed with Dr. Nicho Hollingsworth F  Systemic chemotherapy consists of FOLFOX + Avastin. Side effects reviewed. She agreed to proceed. Mediport placed  Cycle # 1 FOLFOX + Avastin was on 08/17/2021. Was in ED 08/30/2021 for hypoK+ and HypoMg but left AMA  Cycle # 2 FOLFOX + Avastin was on 09/07/2021. CEA 16.4 on 09/07/2021. Cycle # 3 FOLFOX + Avastin was on 09/21/2021. CEA 13.8 on 09/20/2021. Cycle # 4 FOLFOX + Avastin was on 10/12/2021. CEA 12.3 on 10/11/2021. Cycle # 5 FOLFOX + Avastin was on 10/26/2021. CEA 11.7 on 10/25/2021. Cycle # 6 FOLFOX + Avastin was on 11/16/2021. CEA 11.8 on 11/08/2021  Cycle # 7 FOLFOX + Avastin was on 12/08/2021. CEA 9.2 on 12/08/2021. CT chest 01/03/2022 New mild right upper lobe and left upper lobe reticulonodular opacities and ground glass opacities, most likely infectious/inflammatory in etiology.  Consider follow-up to complete resolution. Additional nodular opacities measuring up to 7 mm, stable since 6/16/21.  This may also be evaluated at interval follow-up. Small hiatal hernia.    CT abdomen/pelvis 01/03/2022 decrease in size of previous sigmoid colon mass and multifocal LN. Multifocal hepatic metastasis mildly decreased in size. Flexible sigmoidoscopy 01/11/2022: The perianal and digital rectal examinations were normal.   An infiltrative completely obstructing mass was found in the recto-sigmoid colon. The mass was circumferential. No bleeding was present. Seen by Dr. Opal Ramos on 01/11/2022:   Proceed with Cycle # 8 FOLFOX 01/18/2022  MRI Rectum 01/24/2022  Posttreatment MR STAGE: T4a N1   Since 06/29/2021, post treatment primary tumor assessment:      Incomplete response (likely residual tumor). mrTRG: Grade 3 - Moderate response   Suspicious Mesorectal lymph nodes: Yes, superior rectal lymph node that has decreased in size. Suspicious Extramesorectal lymph nodes: No    Tumor Board 01/28/2022  Discussed with Dr. Opal Ramos today 02/01/2022; Continue systemic chemotherapy FOLFOX + Avastin  Hepatobiliary team evaluation   Cycle # 9 FOLFOX + Avastin 02/02/2022.  CEA 9.9 on 01/31/2022    RTC 2 weeks for Cycle # 10 FOLFOX + Avastin     Radha Degroot MD   02/01/2022

## 2022-02-01 ENCOUNTER — HOSPITAL ENCOUNTER (OUTPATIENT)
Dept: INFUSION THERAPY | Age: 62
Discharge: HOME | End: 2022-02-01

## 2022-02-01 ENCOUNTER — TELEPHONE (OUTPATIENT)
Dept: CASE MANAGEMENT | Age: 62
End: 2022-02-01

## 2022-02-01 ENCOUNTER — OFFICE VISIT (OUTPATIENT)
Dept: ONCOLOGY | Age: 62
End: 2022-02-01
Payer: COMMERCIAL

## 2022-02-01 VITALS
OXYGEN SATURATION: 98 % | SYSTOLIC BLOOD PRESSURE: 123 MMHG | TEMPERATURE: 96.9 F | DIASTOLIC BLOOD PRESSURE: 73 MMHG | HEIGHT: 63 IN | HEART RATE: 69 BPM | BODY MASS INDEX: 23.18 KG/M2 | WEIGHT: 130.8 LBS

## 2022-02-01 DIAGNOSIS — C20 RECTAL CANCER (HCC): Primary | ICD-10-CM

## 2022-02-01 PROCEDURE — 99214 OFFICE O/P EST MOD 30 MIN: CPT | Performed by: INTERNAL MEDICINE

## 2022-02-01 PROCEDURE — 3017F COLORECTAL CA SCREEN DOC REV: CPT | Performed by: INTERNAL MEDICINE

## 2022-02-01 PROCEDURE — 99212 OFFICE O/P EST SF 10 MIN: CPT

## 2022-02-01 PROCEDURE — G8427 DOCREV CUR MEDS BY ELIG CLIN: HCPCS | Performed by: INTERNAL MEDICINE

## 2022-02-01 PROCEDURE — G8420 CALC BMI NORM PARAMETERS: HCPCS | Performed by: INTERNAL MEDICINE

## 2022-02-01 PROCEDURE — G8484 FLU IMMUNIZE NO ADMIN: HCPCS | Performed by: INTERNAL MEDICINE

## 2022-02-01 PROCEDURE — 4004F PT TOBACCO SCREEN RCVD TLK: CPT | Performed by: INTERNAL MEDICINE

## 2022-02-01 RX ORDER — SODIUM CHLORIDE 9 MG/ML
25 INJECTION, SOLUTION INTRAVENOUS PRN
Status: CANCELLED | OUTPATIENT
Start: 2022-02-02

## 2022-02-01 RX ORDER — SODIUM CHLORIDE 0.9 % (FLUSH) 0.9 %
5-40 SYRINGE (ML) INJECTION PRN
Status: CANCELLED | OUTPATIENT
Start: 2022-02-02

## 2022-02-01 RX ORDER — MEPERIDINE HYDROCHLORIDE 25 MG/ML
12.5 INJECTION INTRAMUSCULAR; INTRAVENOUS; SUBCUTANEOUS ONCE
Status: CANCELLED | OUTPATIENT
Start: 2022-02-02 | End: 2022-02-02

## 2022-02-01 RX ORDER — FLUOROURACIL 50 MG/ML
400 INJECTION, SOLUTION INTRAVENOUS ONCE
Status: CANCELLED | OUTPATIENT
Start: 2022-02-02

## 2022-02-01 RX ORDER — SODIUM CHLORIDE 9 MG/ML
INJECTION, SOLUTION INTRAVENOUS ONCE
Status: CANCELLED | OUTPATIENT
Start: 2022-02-02 | End: 2022-02-02

## 2022-02-01 RX ORDER — SODIUM CHLORIDE 9 MG/ML
INJECTION, SOLUTION INTRAVENOUS CONTINUOUS
Status: CANCELLED | OUTPATIENT
Start: 2022-02-02

## 2022-02-01 RX ORDER — METHYLPREDNISOLONE SODIUM SUCCINATE 125 MG/2ML
125 INJECTION, POWDER, LYOPHILIZED, FOR SOLUTION INTRAMUSCULAR; INTRAVENOUS ONCE
Status: CANCELLED | OUTPATIENT
Start: 2022-02-02 | End: 2022-02-02

## 2022-02-01 RX ORDER — EPINEPHRINE 1 MG/ML
0.3 INJECTION, SOLUTION, CONCENTRATE INTRAVENOUS PRN
Status: CANCELLED | OUTPATIENT
Start: 2022-02-02

## 2022-02-01 RX ORDER — DEXTROSE MONOHYDRATE 50 MG/ML
INJECTION, SOLUTION INTRAVENOUS ONCE
Status: CANCELLED | OUTPATIENT
Start: 2022-02-02 | End: 2022-02-02

## 2022-02-01 RX ORDER — PALONOSETRON HYDROCHLORIDE 0.05 MG/ML
0.25 INJECTION, SOLUTION INTRAVENOUS ONCE
Status: CANCELLED | OUTPATIENT
Start: 2022-02-02

## 2022-02-01 RX ORDER — DIPHENHYDRAMINE HYDROCHLORIDE 50 MG/ML
50 INJECTION INTRAMUSCULAR; INTRAVENOUS ONCE
Status: CANCELLED | OUTPATIENT
Start: 2022-02-02 | End: 2022-02-02

## 2022-02-01 RX ORDER — DEXAMETHASONE SODIUM PHOSPHATE 10 MG/ML
10 INJECTION, SOLUTION INTRAMUSCULAR; INTRAVENOUS ONCE
Status: CANCELLED | OUTPATIENT
Start: 2022-02-02

## 2022-02-01 RX ORDER — HEPARIN SODIUM (PORCINE) LOCK FLUSH IV SOLN 100 UNIT/ML 100 UNIT/ML
500 SOLUTION INTRAVENOUS PRN
Status: CANCELLED | OUTPATIENT
Start: 2022-02-02

## 2022-02-01 NOTE — TELEPHONE ENCOUNTER
Met with patient and her  briefly prior to her follow up appointment with Dr. Jose Aguirre and chemotherapy treatment. Patient appears well and is in good spirits. States that she is doing alright with the treatments. Reports eating and sleeping well. Upon inquiring, states that her ostomy is working well without any issues. Appreciative of return call recently but states that the issues resolved so she didn't return call to me. Patient recently saw Dr. Beverly Guido at [facility] on 1/11/2022 for Flex sigmoidoscopy and scans. Dr. [place] Aguirre to discuss case with Dr. Beverly Guido regarding recent TB recommendations from 1/28/2022. Provided support and encouragement. Her  brought for treatment today. Denies any current needs for assistance from NN. Patient appreciative of visit. Will continue to follow as needed. Flori Woods, BSW, RN, OCN  Oncology Nurse Navigator

## 2022-02-02 ENCOUNTER — HOSPITAL ENCOUNTER (OUTPATIENT)
Dept: INFUSION THERAPY | Age: 62
Discharge: HOME | End: 2022-02-02
Payer: COMMERCIAL

## 2022-02-02 DIAGNOSIS — C20 RECTAL CANCER (HCC): Primary | ICD-10-CM

## 2022-02-02 PROCEDURE — 96416 CHEMO PROLONG INFUSE W/PUMP: CPT

## 2022-02-02 PROCEDURE — 96415 CHEMO IV INFUSION ADDL HR: CPT

## 2022-02-02 PROCEDURE — 96413 CHEMO IV INFUSION 1 HR: CPT

## 2022-02-02 PROCEDURE — 96375 TX/PRO/DX INJ NEW DRUG ADDON: CPT

## 2022-02-02 PROCEDURE — 6360000002 HC RX W HCPCS: Performed by: INTERNAL MEDICINE

## 2022-02-02 PROCEDURE — 6360000002 HC RX W HCPCS

## 2022-02-02 PROCEDURE — 2580000003 HC RX 258: Performed by: INTERNAL MEDICINE

## 2022-02-02 PROCEDURE — 96368 THER/DIAG CONCURRENT INF: CPT

## 2022-02-02 PROCEDURE — 96417 CHEMO IV INFUS EACH ADDL SEQ: CPT

## 2022-02-02 PROCEDURE — 96409 CHEMO IV PUSH SNGL DRUG: CPT

## 2022-02-02 RX ORDER — DEXAMETHASONE SODIUM PHOSPHATE 10 MG/ML
10 INJECTION INTRAMUSCULAR; INTRAVENOUS ONCE
Status: COMPLETED | OUTPATIENT
Start: 2022-02-02 | End: 2022-02-02

## 2022-02-02 RX ORDER — FLUOROURACIL 50 MG/ML
400 INJECTION, SOLUTION INTRAVENOUS ONCE
Status: COMPLETED | OUTPATIENT
Start: 2022-02-02 | End: 2022-02-02

## 2022-02-02 RX ORDER — DEXAMETHASONE SODIUM PHOSPHATE 10 MG/ML
INJECTION INTRAMUSCULAR; INTRAVENOUS
Status: COMPLETED
Start: 2022-02-02 | End: 2022-02-02

## 2022-02-02 RX ORDER — SODIUM CHLORIDE 9 MG/ML
INJECTION, SOLUTION INTRAVENOUS ONCE
Status: COMPLETED | OUTPATIENT
Start: 2022-02-02 | End: 2022-02-02

## 2022-02-02 RX ORDER — DEXTROSE MONOHYDRATE 50 MG/ML
INJECTION, SOLUTION INTRAVENOUS ONCE
Status: COMPLETED | OUTPATIENT
Start: 2022-02-02 | End: 2022-02-02

## 2022-02-02 RX ORDER — PALONOSETRON HYDROCHLORIDE 0.05 MG/ML
0.25 INJECTION, SOLUTION INTRAVENOUS ONCE
Status: COMPLETED | OUTPATIENT
Start: 2022-02-02 | End: 2022-02-02

## 2022-02-02 RX ORDER — SODIUM CHLORIDE 0.9 % (FLUSH) 0.9 %
5-40 SYRINGE (ML) INJECTION PRN
Status: DISCONTINUED | OUTPATIENT
Start: 2022-02-02 | End: 2022-02-03 | Stop reason: HOSPADM

## 2022-02-02 RX ORDER — PALONOSETRON HYDROCHLORIDE 0.05 MG/ML
INJECTION, SOLUTION INTRAVENOUS
Status: COMPLETED
Start: 2022-02-02 | End: 2022-02-02

## 2022-02-02 RX ORDER — HEPARIN SODIUM (PORCINE) LOCK FLUSH IV SOLN 100 UNIT/ML 100 UNIT/ML
500 SOLUTION INTRAVENOUS PRN
Status: DISCONTINUED | OUTPATIENT
Start: 2022-02-02 | End: 2022-02-03 | Stop reason: HOSPADM

## 2022-02-02 RX ADMIN — FLUOROURACIL 4150 MG: 50 INJECTION, SOLUTION INTRAVENOUS at 12:56

## 2022-02-02 RX ADMIN — DEXAMETHASONE SODIUM PHOSPHATE 10 MG: 10 INJECTION INTRAMUSCULAR; INTRAVENOUS at 09:23

## 2022-02-02 RX ADMIN — Medication 30 ML: at 09:18

## 2022-02-02 RX ADMIN — PALONOSETRON 0.25 MG: 0.25 INJECTION, SOLUTION INTRAVENOUS at 09:21

## 2022-02-02 RX ADMIN — LEUCOVORIN CALCIUM 700 MG: 350 INJECTION, POWDER, LYOPHILIZED, FOR SOLUTION INTRAMUSCULAR; INTRAVENOUS at 10:31

## 2022-02-02 RX ADMIN — DEXTROSE MONOHYDRATE: 50 INJECTION, SOLUTION INTRAVENOUS at 10:31

## 2022-02-02 RX ADMIN — OXALIPLATIN 145 MG: 5 INJECTION, SOLUTION INTRAVENOUS at 10:32

## 2022-02-02 RX ADMIN — FLUOROURACIL 700 MG: 50 INJECTION, SOLUTION INTRAVENOUS at 12:55

## 2022-02-02 RX ADMIN — SODIUM CHLORIDE: 9 INJECTION, SOLUTION INTRAVENOUS at 09:25

## 2022-02-02 RX ADMIN — SODIUM CHLORIDE 300 MG: 9 INJECTION, SOLUTION INTRAVENOUS at 09:45

## 2022-02-02 RX ADMIN — PALONOSETRON HYDROCHLORIDE 0.25 MG: 0.05 INJECTION, SOLUTION INTRAVENOUS at 09:21

## 2022-02-04 ENCOUNTER — HOSPITAL ENCOUNTER (OUTPATIENT)
Dept: INFUSION THERAPY | Age: 62
Discharge: HOME | End: 2022-02-04
Payer: COMMERCIAL

## 2022-02-04 DIAGNOSIS — I87.8 POOR VENOUS ACCESS: Primary | ICD-10-CM

## 2022-02-04 DIAGNOSIS — C20 RECTAL CANCER (HCC): ICD-10-CM

## 2022-02-04 PROCEDURE — 6360000002 HC RX W HCPCS: Performed by: NURSE PRACTITIONER

## 2022-02-04 PROCEDURE — 2580000003 HC RX 258: Performed by: NURSE PRACTITIONER

## 2022-02-04 PROCEDURE — 99214 OFFICE O/P EST MOD 30 MIN: CPT

## 2022-02-04 RX ORDER — SODIUM CHLORIDE 0.9 % (FLUSH) 0.9 %
5-40 SYRINGE (ML) INJECTION PRN
Status: CANCELLED | OUTPATIENT
Start: 2022-02-04

## 2022-02-04 RX ORDER — SODIUM CHLORIDE 9 MG/ML
25 INJECTION, SOLUTION INTRAVENOUS PRN
Status: CANCELLED | OUTPATIENT
Start: 2022-02-04

## 2022-02-04 RX ORDER — HEPARIN SODIUM (PORCINE) LOCK FLUSH IV SOLN 100 UNIT/ML 100 UNIT/ML
500 SOLUTION INTRAVENOUS PRN
Status: DISCONTINUED | OUTPATIENT
Start: 2022-02-04 | End: 2022-02-05 | Stop reason: HOSPADM

## 2022-02-04 RX ORDER — SODIUM CHLORIDE 0.9 % (FLUSH) 0.9 %
5-40 SYRINGE (ML) INJECTION PRN
Status: DISCONTINUED | OUTPATIENT
Start: 2022-02-04 | End: 2022-02-05 | Stop reason: HOSPADM

## 2022-02-04 RX ORDER — HEPARIN SODIUM (PORCINE) LOCK FLUSH IV SOLN 100 UNIT/ML 100 UNIT/ML
500 SOLUTION INTRAVENOUS PRN
Status: CANCELLED | OUTPATIENT
Start: 2022-02-04

## 2022-02-04 RX ADMIN — SODIUM CHLORIDE, PRESERVATIVE FREE 10 ML: 5 INJECTION INTRAVENOUS at 09:56

## 2022-02-04 RX ADMIN — Medication 500 UNITS: at 09:56

## 2022-02-04 NOTE — PROGRESS NOTES
Presents to clinic for CADD pump removal. Port site appears normal. Denies problems/concerns. Received 241.5 ml of 5-FU & reservoir 18.5 of 5-FU. Port flushed with 10 ml. NSS followed by 5 ml. Heparin Rinse prior to de access. DSD to area. Tolerated well. Encouraged to call clinic with questions/concerns.

## 2022-02-12 DIAGNOSIS — C18.9 ADENOCARCINOMA OF COLON (HCC): ICD-10-CM

## 2022-02-14 ENCOUNTER — HOSPITAL ENCOUNTER (OUTPATIENT)
Dept: INFUSION THERAPY | Age: 62
Discharge: HOME | End: 2022-02-14
Payer: COMMERCIAL

## 2022-02-14 DIAGNOSIS — C20 RECTAL CANCER (HCC): Primary | ICD-10-CM

## 2022-02-14 DIAGNOSIS — I87.8 POOR VENOUS ACCESS: ICD-10-CM

## 2022-02-14 LAB
ALBUMIN SERPL-MCNC: 4.2 G/DL (ref 3.5–5.2)
ALP BLD-CCNC: 148 U/L (ref 35–104)
ALT SERPL-CCNC: 11 U/L (ref 0–32)
ANION GAP SERPL CALCULATED.3IONS-SCNC: 13 MMOL/L (ref 7–16)
AST SERPL-CCNC: 19 U/L (ref 0–31)
BASOPHILS ABSOLUTE: 0.01 E9/L (ref 0–0.2)
BASOPHILS RELATIVE PERCENT: 0.2 % (ref 0–2)
BILIRUB SERPL-MCNC: <0.2 MG/DL (ref 0–1.2)
BUN BLDV-MCNC: 11 MG/DL (ref 6–23)
CALCIUM SERPL-MCNC: 9.4 MG/DL (ref 8.6–10.2)
CEA: 9.5 NG/ML (ref 0–5.2)
CHLORIDE BLD-SCNC: 102 MMOL/L (ref 98–107)
CO2: 23 MMOL/L (ref 22–29)
CREAT SERPL-MCNC: 0.6 MG/DL (ref 0.5–1)
EOSINOPHILS ABSOLUTE: 0.02 E9/L (ref 0.05–0.5)
EOSINOPHILS RELATIVE PERCENT: 0.4 % (ref 0–6)
GFR AFRICAN AMERICAN: >60
GFR NON-AFRICAN AMERICAN: >60 ML/MIN/1.73
GLUCOSE BLD-MCNC: 107 MG/DL (ref 74–99)
HCT VFR BLD CALC: 35.6 % (ref 34–48)
HEMOGLOBIN: 12.5 G/DL (ref 11.5–15.5)
IMMATURE GRANULOCYTES #: 0.02 E9/L
IMMATURE GRANULOCYTES %: 0.4 % (ref 0–5)
LYMPHOCYTES ABSOLUTE: 2.31 E9/L (ref 1.5–4)
LYMPHOCYTES RELATIVE PERCENT: 48 % (ref 20–42)
MAGNESIUM: 2 MG/DL (ref 1.6–2.6)
MCH RBC QN AUTO: 34.8 PG (ref 26–35)
MCHC RBC AUTO-ENTMCNC: 35.1 % (ref 32–34.5)
MCV RBC AUTO: 99.2 FL (ref 80–99.9)
MONOCYTES ABSOLUTE: 0.39 E9/L (ref 0.1–0.95)
MONOCYTES RELATIVE PERCENT: 8.1 % (ref 2–12)
NEUTROPHILS ABSOLUTE: 2.06 E9/L (ref 1.8–7.3)
NEUTROPHILS RELATIVE PERCENT: 42.9 % (ref 43–80)
PDW BLD-RTO: 12.9 FL (ref 11.5–15)
PLATELET # BLD: 103 E9/L (ref 130–450)
PMV BLD AUTO: 9.2 FL (ref 7–12)
POTASSIUM SERPL-SCNC: 3.5 MMOL/L (ref 3.5–5)
RBC # BLD: 3.59 E12/L (ref 3.5–5.5)
SODIUM BLD-SCNC: 138 MMOL/L (ref 132–146)
TOTAL PROTEIN: 7.7 G/DL (ref 6.4–8.3)
WBC # BLD: 4.8 E9/L (ref 4.5–11.5)

## 2022-02-14 PROCEDURE — 83735 ASSAY OF MAGNESIUM: CPT

## 2022-02-14 PROCEDURE — 80053 COMPREHEN METABOLIC PANEL: CPT

## 2022-02-14 PROCEDURE — 36591 DRAW BLOOD OFF VENOUS DEVICE: CPT

## 2022-02-14 PROCEDURE — 2580000003 HC RX 258: Performed by: NURSE PRACTITIONER

## 2022-02-14 PROCEDURE — 85025 COMPLETE CBC W/AUTO DIFF WBC: CPT

## 2022-02-14 PROCEDURE — 6360000002 HC RX W HCPCS: Performed by: NURSE PRACTITIONER

## 2022-02-14 PROCEDURE — 82378 CARCINOEMBRYONIC ANTIGEN: CPT

## 2022-02-14 RX ORDER — OXYCODONE HYDROCHLORIDE 10 MG/1
10 TABLET ORAL EVERY 6 HOURS PRN
Qty: 120 TABLET | Refills: 0 | OUTPATIENT
Start: 2022-02-14 | End: 2022-03-16

## 2022-02-14 RX ORDER — HEPARIN SODIUM (PORCINE) LOCK FLUSH IV SOLN 100 UNIT/ML 100 UNIT/ML
500 SOLUTION INTRAVENOUS PRN
Status: CANCELLED | OUTPATIENT
Start: 2022-02-14

## 2022-02-14 RX ORDER — SODIUM CHLORIDE 9 MG/ML
25 INJECTION, SOLUTION INTRAVENOUS PRN
Status: CANCELLED | OUTPATIENT
Start: 2022-02-14

## 2022-02-14 RX ORDER — SODIUM CHLORIDE 0.9 % (FLUSH) 0.9 %
5-40 SYRINGE (ML) INJECTION PRN
Status: CANCELLED | OUTPATIENT
Start: 2022-02-14

## 2022-02-14 RX ORDER — HEPARIN SODIUM (PORCINE) LOCK FLUSH IV SOLN 100 UNIT/ML 100 UNIT/ML
500 SOLUTION INTRAVENOUS PRN
Status: DISCONTINUED | OUTPATIENT
Start: 2022-02-14 | End: 2022-02-15 | Stop reason: HOSPADM

## 2022-02-14 RX ORDER — SODIUM CHLORIDE 0.9 % (FLUSH) 0.9 %
5-40 SYRINGE (ML) INJECTION PRN
Status: DISCONTINUED | OUTPATIENT
Start: 2022-02-14 | End: 2022-02-15 | Stop reason: HOSPADM

## 2022-02-14 RX ADMIN — Medication 500 UNITS: at 09:07

## 2022-02-14 RX ADMIN — SODIUM CHLORIDE, PRESERVATIVE FREE 30 ML: 5 INJECTION INTRAVENOUS at 09:07

## 2022-02-14 NOTE — PROGRESS NOTES
Patient presents to clinic for [facility] today. L  SQ port accessed per policy using 07R 3.4CE Iglesias needle for good blood return. Site flushed easily with 10 mL NSS followed by 5 mL Heparin solution 100 units/ml rinse prior to de-access. Dry sterile dressing to area. Tolerated procedure well. Encouraged to schedule port flush every 4 weeks.

## 2022-02-14 NOTE — TELEPHONE ENCOUNTER
Last Appointment:  9/15/2021  Future Appointments   Date Time Provider Dandre Onofre   2/15/2022  8:30 AM Luba Bermudez MD [facility]   2/15/2022  8:50 AM [address] [place] [address] Maribell Anne

## 2022-02-15 ENCOUNTER — OFFICE VISIT (OUTPATIENT)
Dept: ONCOLOGY | Age: 62
End: 2022-02-15
Payer: COMMERCIAL

## 2022-02-15 ENCOUNTER — HOSPITAL ENCOUNTER (OUTPATIENT)
Dept: INFUSION THERAPY | Age: 62
Discharge: HOME | End: 2022-02-15
Payer: COMMERCIAL

## 2022-02-15 VITALS
OXYGEN SATURATION: 100 % | SYSTOLIC BLOOD PRESSURE: 140 MMHG | TEMPERATURE: 96.1 F | HEART RATE: 72 BPM | RESPIRATION RATE: 18 BRPM | DIASTOLIC BLOOD PRESSURE: 83 MMHG

## 2022-02-15 VITALS
SYSTOLIC BLOOD PRESSURE: 144 MMHG | HEART RATE: 52 BPM | HEIGHT: 63 IN | WEIGHT: 127.9 LBS | BODY MASS INDEX: 22.66 KG/M2 | DIASTOLIC BLOOD PRESSURE: 83 MMHG | OXYGEN SATURATION: 98 % | TEMPERATURE: 98.7 F

## 2022-02-15 DIAGNOSIS — C20 RECTAL CANCER (HCC): Primary | ICD-10-CM

## 2022-02-15 DIAGNOSIS — C18.9 ADENOCARCINOMA OF COLON (HCC): ICD-10-CM

## 2022-02-15 PROCEDURE — 4004F PT TOBACCO SCREEN RCVD TLK: CPT | Performed by: INTERNAL MEDICINE

## 2022-02-15 PROCEDURE — 96416 CHEMO PROLONG INFUSE W/PUMP: CPT

## 2022-02-15 PROCEDURE — 96413 CHEMO IV INFUSION 1 HR: CPT

## 2022-02-15 PROCEDURE — 96368 THER/DIAG CONCURRENT INF: CPT

## 2022-02-15 PROCEDURE — G8427 DOCREV CUR MEDS BY ELIG CLIN: HCPCS | Performed by: INTERNAL MEDICINE

## 2022-02-15 PROCEDURE — 96411 CHEMO IV PUSH ADDL DRUG: CPT

## 2022-02-15 PROCEDURE — 6360000002 HC RX W HCPCS: Performed by: INTERNAL MEDICINE

## 2022-02-15 PROCEDURE — G8420 CALC BMI NORM PARAMETERS: HCPCS | Performed by: INTERNAL MEDICINE

## 2022-02-15 PROCEDURE — 99214 OFFICE O/P EST MOD 30 MIN: CPT | Performed by: INTERNAL MEDICINE

## 2022-02-15 PROCEDURE — 96375 TX/PRO/DX INJ NEW DRUG ADDON: CPT

## 2022-02-15 PROCEDURE — G8484 FLU IMMUNIZE NO ADMIN: HCPCS | Performed by: INTERNAL MEDICINE

## 2022-02-15 PROCEDURE — 3017F COLORECTAL CA SCREEN DOC REV: CPT | Performed by: INTERNAL MEDICINE

## 2022-02-15 PROCEDURE — 96415 CHEMO IV INFUSION ADDL HR: CPT

## 2022-02-15 PROCEDURE — 96417 CHEMO IV INFUS EACH ADDL SEQ: CPT

## 2022-02-15 PROCEDURE — 2580000003 HC RX 258: Performed by: INTERNAL MEDICINE

## 2022-02-15 RX ORDER — HEPARIN SODIUM (PORCINE) LOCK FLUSH IV SOLN 100 UNIT/ML 100 UNIT/ML
500 SOLUTION INTRAVENOUS PRN
Status: CANCELLED | OUTPATIENT
Start: 2022-02-15

## 2022-02-15 RX ORDER — OXYCODONE HYDROCHLORIDE 10 MG/1
10 TABLET ORAL EVERY 6 HOURS PRN
Qty: 120 TABLET | Refills: 0 | Status: SHIPPED
Start: 2022-02-15 | End: 2022-03-12 | Stop reason: SDUPTHER

## 2022-02-15 RX ORDER — HEPARIN SODIUM (PORCINE) LOCK FLUSH IV SOLN 100 UNIT/ML 100 UNIT/ML
500 SOLUTION INTRAVENOUS PRN
Status: DISCONTINUED | OUTPATIENT
Start: 2022-02-15 | End: 2022-02-16 | Stop reason: HOSPADM

## 2022-02-15 RX ORDER — SODIUM CHLORIDE 9 MG/ML
25 INJECTION, SOLUTION INTRAVENOUS PRN
Status: CANCELLED | OUTPATIENT
Start: 2022-02-15

## 2022-02-15 RX ORDER — SODIUM CHLORIDE 9 MG/ML
INJECTION, SOLUTION INTRAVENOUS ONCE
Status: COMPLETED | OUTPATIENT
Start: 2022-02-15 | End: 2022-02-15

## 2022-02-15 RX ORDER — PALONOSETRON HYDROCHLORIDE 0.05 MG/ML
0.25 INJECTION, SOLUTION INTRAVENOUS ONCE
Status: COMPLETED | OUTPATIENT
Start: 2022-02-15 | End: 2022-02-15

## 2022-02-15 RX ORDER — MEPERIDINE HYDROCHLORIDE 25 MG/ML
12.5 INJECTION INTRAMUSCULAR; INTRAVENOUS; SUBCUTANEOUS ONCE
Status: CANCELLED | OUTPATIENT
Start: 2022-02-15 | End: 2022-02-16

## 2022-02-15 RX ORDER — FLUOROURACIL 50 MG/ML
400 INJECTION, SOLUTION INTRAVENOUS ONCE
Status: CANCELLED | OUTPATIENT
Start: 2022-02-15

## 2022-02-15 RX ORDER — DEXTROSE MONOHYDRATE 50 MG/ML
INJECTION, SOLUTION INTRAVENOUS ONCE
Status: CANCELLED | OUTPATIENT
Start: 2022-02-15 | End: 2022-02-16

## 2022-02-15 RX ORDER — SODIUM CHLORIDE 9 MG/ML
INJECTION, SOLUTION INTRAVENOUS CONTINUOUS
Status: CANCELLED | OUTPATIENT
Start: 2022-02-15

## 2022-02-15 RX ORDER — DIPHENHYDRAMINE HYDROCHLORIDE 50 MG/ML
50 INJECTION INTRAMUSCULAR; INTRAVENOUS ONCE
Status: CANCELLED | OUTPATIENT
Start: 2022-02-15 | End: 2022-02-16

## 2022-02-15 RX ORDER — SODIUM CHLORIDE 0.9 % (FLUSH) 0.9 %
5-40 SYRINGE (ML) INJECTION PRN
Status: CANCELLED | OUTPATIENT
Start: 2022-02-15

## 2022-02-15 RX ORDER — DEXTROSE MONOHYDRATE 50 MG/ML
250 INJECTION, SOLUTION INTRAVENOUS ONCE
Status: COMPLETED | OUTPATIENT
Start: 2022-02-15 | End: 2022-02-15

## 2022-02-15 RX ORDER — SODIUM CHLORIDE 0.9 % (FLUSH) 0.9 %
5-40 SYRINGE (ML) INJECTION PRN
Status: DISCONTINUED | OUTPATIENT
Start: 2022-02-15 | End: 2022-02-16 | Stop reason: HOSPADM

## 2022-02-15 RX ORDER — FLUOROURACIL 50 MG/ML
400 INJECTION, SOLUTION INTRAVENOUS ONCE
Status: COMPLETED | OUTPATIENT
Start: 2022-02-15 | End: 2022-02-15

## 2022-02-15 RX ORDER — METHYLPREDNISOLONE SODIUM SUCCINATE 125 MG/2ML
125 INJECTION, POWDER, LYOPHILIZED, FOR SOLUTION INTRAMUSCULAR; INTRAVENOUS ONCE
Status: CANCELLED | OUTPATIENT
Start: 2022-02-15 | End: 2022-02-16

## 2022-02-15 RX ORDER — SODIUM CHLORIDE 9 MG/ML
INJECTION, SOLUTION INTRAVENOUS ONCE
Status: CANCELLED | OUTPATIENT
Start: 2022-02-15 | End: 2022-02-16

## 2022-02-15 RX ORDER — DEXAMETHASONE SODIUM PHOSPHATE 10 MG/ML
10 INJECTION INTRAMUSCULAR; INTRAVENOUS ONCE
Status: COMPLETED | OUTPATIENT
Start: 2022-02-15 | End: 2022-02-15

## 2022-02-15 RX ORDER — EPINEPHRINE 1 MG/ML
0.3 INJECTION, SOLUTION, CONCENTRATE INTRAVENOUS PRN
Status: CANCELLED | OUTPATIENT
Start: 2022-02-15

## 2022-02-15 RX ORDER — PALONOSETRON HYDROCHLORIDE 0.05 MG/ML
0.25 INJECTION, SOLUTION INTRAVENOUS ONCE
Status: CANCELLED | OUTPATIENT
Start: 2022-02-15

## 2022-02-15 RX ORDER — DEXAMETHASONE SODIUM PHOSPHATE 10 MG/ML
10 INJECTION, SOLUTION INTRAMUSCULAR; INTRAVENOUS ONCE
Status: CANCELLED | OUTPATIENT
Start: 2022-02-15

## 2022-02-15 RX ADMIN — OXALIPLATIN 145 MG: 5 INJECTION, SOLUTION, CONCENTRATE INTRAVENOUS at 11:00

## 2022-02-15 RX ADMIN — LEUCOVORIN CALCIUM 700 MG: 350 INJECTION, POWDER, LYOPHILIZED, FOR SOLUTION INTRAMUSCULAR; INTRAVENOUS at 10:59

## 2022-02-15 RX ADMIN — DEXTROSE MONOHYDRATE 250 ML: 50 INJECTION, SOLUTION INTRAVENOUS at 10:46

## 2022-02-15 RX ADMIN — SODIUM CHLORIDE: 9 INJECTION, SOLUTION INTRAVENOUS at 09:40

## 2022-02-15 RX ADMIN — DEXAMETHASONE SODIUM PHOSPHATE 10 MG: 10 INJECTION INTRAMUSCULAR; INTRAVENOUS at 09:35

## 2022-02-15 RX ADMIN — FLUOROURACIL 700 MG: 50 INJECTION, SOLUTION INTRAVENOUS at 13:27

## 2022-02-15 RX ADMIN — SODIUM CHLORIDE 300 MG: 9 INJECTION, SOLUTION INTRAVENOUS at 09:58

## 2022-02-15 RX ADMIN — PALONOSETRON 0.25 MG: 0.25 INJECTION, SOLUTION INTRAVENOUS at 09:35

## 2022-02-15 NOTE — TELEPHONE ENCOUNTER
Controlled Substance Monitoring:    Acute and Chronic Pain Monitoring:   RX Monitoring 2/15/2022   Acute Pain Prescriptions -   Periodic Controlled Substance Monitoring Possible medication side effects, risk of tolerance/dependence & alternative treatments discussed. ;No signs of potential drug abuse or diversion identified. ;Assessed functional status. ;Obtaining appropriate analgesic effect of treatment.    Chronic Pain > 50 MEDD -

## 2022-02-15 NOTE — PROGRESS NOTES
Department of [place] [address]     Attending Clinic Note    Reason for Visit: Follow-up on a patient with Rectal cancer    PCP:  Concepcion White MD    History of Present Illness:  63 y/o female who was complaining of diarrhea blood in stool and painful defecation    CT abdomen/pelvis 06/16/2021:  Circumferentially infiltrative mass in the upper rectum, consistent with malignancy  Metastatic lymph nodes in the adjacent mesorectum, as well as the precaval region of the upper abdomen. Multiple prominent liver metastases    CTA chest 06/16/2021 noted no evidence of PE.  7 mm nodule seen within the right lower lobe posteriorly worrisome for metastatic disease. No region of intrathoracic lymphadenopathy is identified    Flex sigmoidoscopy 06/29/2021:  Nearly complete obstructive rectal mass at 15cms from AV  Colon, rectum, biopsy - Colonic mucosa with high-grade dysplasia; see comment. COMMENT   Additional deeper levels were also examined    MRI Rectum on 06/29/2021:  7.7 cm rectosigmoid mass spanning and invading the peritoneal reflection with superior rectal lymphadenopathy. Stage: T4a N2  MRF: Clear (tumor margin >2 mm from MRF)   Sphincter involvement: No.   Suspicious extra mesorectal lymph nodes: No    CEA 25.4 on 07/14/2021    Laparoscopic loop sigmoid colostomy and liver biopsy 07/15/2021  Liver, biopsy - Adenocarcinoma, morphologically compatible with colorectal primary  NGS testing ([facility]) ordered and pending    Case discussed with Dr. Nova Guardilaura [facility]  Systemic chemotherapy consists of FOLFOX + Avastin. Side effects reviewed. She agreed to proceed. Mediport placed  Cycle # 1 FOLFOX + Avastin was on 08/17/2021  Was in ED 08/30/2021 for hypoK+ and HypoMg but left AMA  Cycle # 2 FOLFOX + Avastin was on 09/07/2021. Cycle # 3 FOLFOX + Avastin was on 09/21/2021. Cycle # 4 FOLFOX + Avastin was on 10/12/2021. Cycle # 5 FOLFOX + Avastin was on 10/26/2021.    Cycle # 6 FOLFOX + Avastin was on 11/16/2021. Cycle # 7 FOLFOX + Avastin was on 12/08/2021. CT chest 01/03/2022 New mild right upper lobe and left upper lobe reticulonodular opacities and ground glass opacities, most likely infectious/inflammatory in etiology.  Consider follow-up to complete resolution. Additional nodular opacities measuring up to 7 mm, stable since 6/16/21.  This may also be evaluated at interval follow-up. Small hiatal hernia. CT abdomen/pelvis 01/03/2022 decrease in size of previous sigmoid colon mass and multifocal LN. Multifocal hepatic metastasis mildly decreased in size. Flexible sigmoidoscopy 01/11/2022: The perianal and digital rectal examinations were normal.   An infiltrative completely obstructing mass was found in the recto-sigmoid colon. The mass was circumferential. No bleeding was present. Seen by Dr. Estephanie Freeman on 01/11/2022:   Proceed with Cycle # 8 FOLFOX 01/18/2022  MRI Rectum 01/24/2022  Posttreatment MR STAGE: T4a N1   Since 06/29/2021, post treatment primary tumor assessment:      Incomplete response (likely residual tumor). mrTRG: Grade 3 - Moderate response   Suspicious Mesorectal lymph nodes: Yes, superior rectal lymph node that has decreased in size. Suspicious Extramesorectal lymph nodes: No    Tumor Board 01/28/2022  Discussed with Dr. Estephanie Freeman 02/01/2022; Continue systemic chemotherapy FOLFOX + Avastin  Hepatobiliary team evaluation   Cycle # 9 FOLFOX + Avastin 02/02/2022. CEA 9.9 on 01/31/2022  Seen by HBP team on 02/09/2022 (Dr. Jamee Lowery): Two stage hepatectomy with right portal vein embolization (liver first approach) followed by removal of primary rectal cancer. Obtain updated PET-MRI with eovist and updated labs including CEA levels. Cycle # 10 FOLFOX + Avastin 02/15/2022. CEA 9.5 on 02/14/2022  Today 02/15/2022. No fever, chills. Fair appetite and energy level.  She will be calling HBP team for further questions about liver surgery    Review of Systems;  CONSTITUTIONAL: No fever, chills. Fair appetite and energy level. ENMT: Eyes: No diplopia; Nose: No epistaxis. Mouth: No sore throat. RESPIRATORY: No hemoptysis, shortness of breath, cough. CARDIOVASCULAR: No chest pain, palpitations. GASTROINTESTINAL: No N.V, abdominal pain  GENITOURINARY: No dysuria, urinary frequency, hematuria. NEURO: No syncope, presyncope, headache. Remainder:ROS NEGATIVE    Past Medical History:      Diagnosis Date    [facility]     Anxiety     Cancer ([facility]     Chronic right shoulder pain     Convulsions/seizures ([facility] 09/16/2011    Depression     Depression 12/14/2020    GERD (gastroesophageal reflux disease)     Headache     HTN (hypertension) 06/16/2015    Mixed hyperlipidemia 02/17/2016    Osteoarthritis     Psychiatric problem     Substance abuse (HCC)     methadone     Medications:  Reviewed and reconciled. Allergies:  No Known Allergies    Physical Exam:  Temp 98.7 °F (37.1 °C)   Ht 5' 3\" (1.6 m)   Wt 127 lb 14.4 oz (58 kg)   LMP 09/08/2011   BMI 22.66 kg/m²   GENERAL: Alert, oriented x 3, not in distress  HEENT: PERRLA; EOMI. EXTREMITIES: Without clubbing, cyanosis, or edema. NEUROLOGIC: No focal deficits.    ECOG PS 1    Lab Results   Component Value Date    WBC 4.8 02/14/2022    HGB 12.5 02/14/2022    HCT 35.6 02/14/2022    MCV 99.2 02/14/2022     (L) 02/14/2022     Lab Results   Component Value Date     02/14/2022    K 3.5 02/14/2022     02/14/2022    CO2 23 02/14/2022    BUN 11 02/14/2022    CREATININE 0.6 02/14/2022    GLUCOSE 107 (H) 02/14/2022    CALCIUM 9.4 02/14/2022    PROT 7.7 02/14/2022    LABALBU 4.2 02/14/2022    BILITOT <0.2 02/14/2022    ALKPHOS 148 (H) 02/14/2022    AST 19 02/14/2022    ALT 11 02/14/2022    LABGLOM >60 02/14/2022    GFRAA >60 02/14/2022     Lab Results   Component Value Date    MG 2.0 02/14/2022     Lab Results   Component Value Date    CEA 9.5 (H) 02/14/2022     Impression/Plan:  63 y/o female with clinical stage: AL2TQ8C4 Rectosigmoid Cancer  CT abdomen/pelvis 06/16/2021:  Circumferentially infiltrative mass in the upper rectum, consistent with malignancy  Metastatic lymph nodes in the adjacent mesorectum, as well as the precaval region of the upper abdomen. Multiple prominent liver metastases    CTA chest 06/16/2021 noted no evidence of PE.  7 mm nodule seen within the right lower lobe posteriorly worrisome for metastatic disease. No region of intrathoracic lymphadenopathy is identified    Flex sigmoidoscopy 06/29/2021:  Nearly complete obstructive rectal mass at 15cms from AV  Colon, rectum, biopsy - Colonic mucosa with high-grade dysplasia; see comment. COMMENT   Additional deeper levels were also examined    MRI Rectum on 06/29/2021:  7.7 cm rectosigmoid mass spanning and invading the peritoneal reflection with superior rectal lymphadenopathy. Stage: T4a N2  MRF: Clear (tumor margin >2 mm from MRF)   Sphincter involvement: No.   Suspicious extra mesorectal lymph nodes: No    CEA 25.4 on 07/14/2021    Laparoscopic loop sigmoid colostomy and liver biopsy 07/15/2021  Liver, biopsy - Adenocarcinoma, morphologically compatible with colorectal primary  NGS testing ([facility] [facility])   TMB 5Muts/Mb; No therapies or clinical trials  MSI-High Not detected  PTEN: Therapies with clinical benefit in tumor type: None  Therapies with clinical benefit in other tumor type: None    Case discussed with Dr. Connor Gardner [facility]  Systemic chemotherapy consists of FOLFOX + Avastin. Side effects reviewed. She agreed to proceed. Mediport placed  Cycle # 1 FOLFOX + Avastin was on 08/17/2021. Was in ED 08/30/2021 for hypoK+ and HypoMg but left AMA  Cycle # 2 FOLFOX + Avastin was on 09/07/2021. CEA 16.4 on 09/07/2021. Cycle # 3 FOLFOX + Avastin was on 09/21/2021. CEA 13.8 on 09/20/2021. Cycle # 4 FOLFOX + Avastin was on 10/12/2021. CEA 12.3 on 10/11/2021. Cycle # 5 FOLFOX + Avastin was on 10/26/2021. CEA 11.7 on 10/25/2021.   Cycle # 6 FOLFOX + Avastin was on 11/16/2021. CEA 11.8 on 11/08/2021  Cycle # 7 FOLFOX + Avastin was on 12/08/2021. CEA 9.2 on 12/08/2021. CT chest 01/03/2022 New mild right upper lobe and left upper lobe reticulonodular opacities and ground glass opacities, most likely infectious/inflammatory in etiology.  Consider follow-up to complete resolution. Additional nodular opacities measuring up to 7 mm, stable since 6/16/21.  This may also be evaluated at interval follow-up. Small hiatal hernia. CT abdomen/pelvis 01/03/2022 decrease in size of previous sigmoid colon mass and multifocal LN. Multifocal hepatic metastasis mildly decreased in size. Flexible sigmoidoscopy 01/11/2022: The perianal and digital rectal examinations were normal.   An infiltrative completely obstructing mass was found in the recto-sigmoid colon. The mass was circumferential. No bleeding was present. Seen by Dr. Kev Golver on 01/11/2022:   Proceed with Cycle # 8 FOLFOX 01/18/2022  MRI Rectum 01/24/2022  Posttreatment MR STAGE: T4a N1   Since 06/29/2021, post treatment primary tumor assessment:      Incomplete response (likely residual tumor). mrTRG: Grade 3 - Moderate response   Suspicious Mesorectal lymph nodes: Yes, superior rectal lymph node that has decreased in size. Suspicious Extramesorectal lymph nodes: No    Tumor Board 01/28/2022  Discussed with Dr. Kev Glover 02/01/2022; Continue systemic chemotherapy FOLFOX + Avastin  Hepatobiliary team evaluation   Cycle # 9 FOLFOX + Avastin 02/02/2022. CEA 9.9 on 01/31/2022  Seen by HBP team on 02/09/2022 (Dr. Prasad Saravia): Two stage hepatectomy with right portal vein embolization (liver first approach) followed by removal of primary rectal cancer. Obtain updated PET-MRI with eovist and updated labs including CEA levels. Cycle # 10 FOLFOX + Avastin 02/15/2022.  CEA 9.5 on 02/14/2022    RTC 2 weeks for Cycle # 11 FOLFOX + Avastin     Joseline Leong MD   02/15/2022

## 2022-02-17 ENCOUNTER — HOSPITAL ENCOUNTER (OUTPATIENT)
Dept: INFUSION THERAPY | Age: 62
Discharge: HOME | End: 2022-02-17
Payer: COMMERCIAL

## 2022-02-17 DIAGNOSIS — I87.8 POOR VENOUS ACCESS: Primary | ICD-10-CM

## 2022-02-17 DIAGNOSIS — C20 RECTAL CANCER (HCC): ICD-10-CM

## 2022-02-17 PROCEDURE — 2580000003 HC RX 258: Performed by: NURSE PRACTITIONER

## 2022-02-17 PROCEDURE — 99214 OFFICE O/P EST MOD 30 MIN: CPT

## 2022-02-17 PROCEDURE — 6360000002 HC RX W HCPCS: Performed by: NURSE PRACTITIONER

## 2022-02-17 RX ORDER — HEPARIN SODIUM (PORCINE) LOCK FLUSH IV SOLN 100 UNIT/ML 100 UNIT/ML
500 SOLUTION INTRAVENOUS PRN
OUTPATIENT
Start: 2022-02-17

## 2022-02-17 RX ORDER — HEPARIN SODIUM (PORCINE) LOCK FLUSH IV SOLN 100 UNIT/ML 100 UNIT/ML
500 SOLUTION INTRAVENOUS PRN
Status: DISCONTINUED | OUTPATIENT
Start: 2022-02-17 | End: 2022-02-18 | Stop reason: HOSPADM

## 2022-02-17 RX ORDER — SODIUM CHLORIDE 0.9 % (FLUSH) 0.9 %
5-40 SYRINGE (ML) INJECTION PRN
OUTPATIENT
Start: 2022-02-17

## 2022-02-17 RX ORDER — SODIUM CHLORIDE 9 MG/ML
25 INJECTION, SOLUTION INTRAVENOUS PRN
OUTPATIENT
Start: 2022-02-17

## 2022-02-17 RX ORDER — SODIUM CHLORIDE 0.9 % (FLUSH) 0.9 %
5-40 SYRINGE (ML) INJECTION PRN
Status: DISCONTINUED | OUTPATIENT
Start: 2022-02-17 | End: 2022-02-18 | Stop reason: HOSPADM

## 2022-02-17 RX ADMIN — Medication 500 UNITS: at 12:25

## 2022-02-17 RX ADMIN — SODIUM CHLORIDE, PRESERVATIVE FREE 10 ML: 5 INJECTION INTRAVENOUS at 12:25

## 2022-02-17 NOTE — PROGRESS NOTES
Presents to clinic for CADD pump removal. Port site appears normal. Denies problems/concerns. Received 252.3 ml of 5-FU & reservoir 7.7 of 5-FU. Port flushed with 10 ml. NSS followed by 5 ml. Heparin Rinse prior to de access. DSD to area. Tolerated well. Encouraged to call clinic with questions/concerns.

## 2022-02-28 ENCOUNTER — HOSPITAL ENCOUNTER (OUTPATIENT)
Dept: INFUSION THERAPY | Age: 62
Discharge: HOME | End: 2022-02-28

## 2022-02-28 DIAGNOSIS — C20 RECTAL CANCER (HCC): Primary | ICD-10-CM

## 2022-03-01 ENCOUNTER — HOSPITAL ENCOUNTER (OUTPATIENT)
Dept: INFUSION THERAPY | Age: 62
Discharge: HOME | End: 2022-03-01

## 2022-03-01 DIAGNOSIS — C20 RECTAL CANCER (HCC): Primary | ICD-10-CM

## 2022-03-12 DIAGNOSIS — C18.9 ADENOCARCINOMA OF COLON (HCC): ICD-10-CM

## 2022-03-14 ENCOUNTER — CLINICAL DOCUMENTATION (OUTPATIENT)
Dept: FAMILY MEDICINE CLINIC | Age: 62
End: 2022-03-14

## 2022-03-14 RX ORDER — OXYCODONE HYDROCHLORIDE 10 MG/1
10 TABLET ORAL EVERY 6 HOURS PRN
Qty: 120 TABLET | Refills: 0 | Status: SHIPPED
Start: 2022-03-14 | End: 2022-04-13 | Stop reason: SDUPTHER

## 2022-03-14 NOTE — PROGRESS NOTES
Controlled Substance Monitoring:    Acute and Chronic Pain Monitoring:   RX Monitoring 3/14/2022   Acute Pain Prescriptions -   Periodic Controlled Substance Monitoring Possible medication side effects, risk of tolerance/dependence & alternative treatments discussed. ;No signs of potential drug abuse or diversion identified. ;Assessed functional status. ;Obtaining appropriate analgesic effect of treatment.    Chronic Pain > 50 MEDD -

## 2022-03-22 ENCOUNTER — TELEPHONE (OUTPATIENT)
Dept: CASE MANAGEMENT | Age: 62
End: 2022-03-22

## 2022-03-22 NOTE — TELEPHONE ENCOUNTER
Spoke with patient for follow up after her liver surgery at [facility]. Patient states that it didn't go as planned due to metastatic disease found in her lung during surgery and she voiced great disappointment. She states that she has decided that she wants to resume chemotherapy but is concerned for her quality of life and activities that she would like to complete this summer with her /family. Provided support and encouragement. Discussed Palliative care referral for symptom management support during treatment. States that she will think about it and let Dr. Jennifer Red know if she would like the referral at her next appointment. Also inquired if she is interested in SW referral for additional support and she declined. States that she is strong in her esther with God and has good family support. Informed her that I will have the secretaries add her to Dr. Parker Valente schedule for blood work and Dr. Jennifer Red follow up/treatment appointment and contact her with the appointment. Verbalizes understanding. Denies any additional needs from NN at this time. Appreciative of call. Staff updated. Flori Shaw, KARLAW, RN, OCN  Oncology Nurse Navigator

## 2022-03-28 DIAGNOSIS — C20 RECTAL CANCER (HCC): Primary | ICD-10-CM

## 2022-03-30 ENCOUNTER — TELEPHONE (OUTPATIENT)
Dept: CASE MANAGEMENT | Age: 62
End: 2022-03-30

## 2022-03-30 NOTE — TELEPHONE ENCOUNTER
Contacted patient regarding missed appointment yesterday with Dr. Christy Barragan at [facility]. She states that she doesn't want to get anymore chemotherapy and is aware of palliative/hospice services. States that she wants quality of life and doesn't want to be sick with side effects. Listened supportively. Encouraged her to see Dr. Christy Barragan for follow up and have a discussion on her next steps with treatment or surveillance. If she would like to pursue Palliative care for support instead of chemotherapy, then Dr. Christy Barragan could refer her at that appointment. Patient agreeable to appointment for blood work/port flush at 1015 and follow up appointment at [phone] with Dr. Christy Barragan on 4/5/2022. Denies any additional needs from NN at this time. Appreciative of call. Flori Whelan, KARLAW, RN, OCN  Oncology Nurse Navigator

## 2022-04-04 ENCOUNTER — TELEPHONE (OUTPATIENT)
Dept: FAMILY MEDICINE CLINIC | Age: 62
End: 2022-04-04

## 2022-04-04 NOTE — PROGRESS NOTES
[facility] reaching out for records. Called patient to discuss. Patient states that she has decided to pursue hospice and asks that requested information be provided to [facility]. States she is not interested in any additional treatment for her metastatic rectal cancer after her aborted 2 stage hepatectomy at [facility]. Home visits have already been made and patient states she is currently already under hospice care. Called, [place], at [facility] who confirms patient self referred herself to hospice. Requested scans, labs, notes sent. Scheduled appt for Dr. Bright Overall cancelled for 4/05/2022.

## 2022-04-04 NOTE — TELEPHONE ENCOUNTER
James Ortiz from [facility] called in and is asking if you will sign to admit [facility]. If so she is asking if you want to follow or if you want to defer to their medical director. She is needing the admit order today, she is wanting a verbal. If you are ok I will call her back with you decision.     Please advise    Thank you

## 2022-04-05 ENCOUNTER — HOSPITAL ENCOUNTER (OUTPATIENT)
Dept: INFUSION THERAPY | Age: 62
End: 2022-04-05

## 2022-04-05 NOTE — TELEPHONE ENCOUNTER
I sent a message to JOHN SALCIDO that I would sign the order to admit Arabella Rivero, and that the medical director of the hospice would manage the patient.

## 2022-04-13 DIAGNOSIS — C18.9 ADENOCARCINOMA OF COLON (HCC): ICD-10-CM

## 2022-04-14 ENCOUNTER — CLINICAL DOCUMENTATION (OUTPATIENT)
Dept: FAMILY MEDICINE CLINIC | Age: 62
End: 2022-04-14

## 2022-04-14 RX ORDER — OXYCODONE HYDROCHLORIDE 10 MG/1
10 TABLET ORAL EVERY 6 HOURS PRN
Qty: 120 TABLET | Refills: 0 | Status: SHIPPED | OUTPATIENT
Start: 2022-04-14 | End: 2022-05-14

## 2022-04-14 NOTE — PROGRESS NOTES
Controlled Substance Monitoring:    Acute and Chronic Pain Monitoring:   RX Monitoring 4/14/2022   Acute Pain Prescriptions -   Periodic Controlled Substance Monitoring Possible medication side effects, risk of tolerance/dependence & alternative treatments discussed. ;No signs of potential drug abuse or diversion identified. ;Assessed functional status. ;Obtaining appropriate analgesic effect of treatment.    Chronic Pain > 50 MEDD -

## (undated) DEVICE — MARKER,SKIN,WI/RULER AND LABELS: Brand: MEDLINE

## (undated) DEVICE — GOWN,SIRUS,FABRNF,XL,20/CS: Brand: MEDLINE

## (undated) DEVICE — CATHETER HAD L24CM DIA15.5FR BASIC LNG TERM POLYUR STR

## (undated) DEVICE — NEEDLE HYPO 25GA L1.5IN BLU POLYPR HUB S STL REG BVL STR

## (undated) DEVICE — PENCIL ES L3M BTTN SWCH HOLSTER W/ BLDE ELECTRD EDGE

## (undated) DEVICE — SET SURG INSTR DISSECT

## (undated) DEVICE — GLOVE ORANGE PI 7 1/2   MSG9075

## (undated) DEVICE — INTENDED FOR TISSUE SEPARATION, AND OTHER PROCEDURES THAT REQUIRE A SHARP SURGICAL BLADE TO PUNCTURE OR CUT.: Brand: BARD-PARKER ® STAINLESS STEEL BLADES

## (undated) DEVICE — COVER,LIGHT HANDLE,FLX,1/PK: Brand: MEDLINE INDUSTRIES, INC.

## (undated) DEVICE — PACK,LAPAROTOMY,NO GOWNS: Brand: MEDLINE

## (undated) DEVICE — APPLICATOR MEDICATED 26 CC SOLUTION HI LT ORNG CHLORAPREP

## (undated) DEVICE — TOWEL,OR,DSP,ST,BLUE,STD,6/PK,12PK/CS: Brand: MEDLINE

## (undated) DEVICE — BLADE ES ELASTOMERIC COAT INSUL DURABLE BEND UPTO 90DEG

## (undated) DEVICE — SCANLAN® SUTURE BOOT™ INSTRUMENT JAW COVERS - ORIGINAL YELLOW, MINI PKG (3 PAIR/CARTRIDGE, 1 CARTRIDGE/PKG): Brand: SCANLAN® SUTURE BOOT™ INSTRUMENT JAW COVERS

## (undated) DEVICE — ELECTRODE PT RET AD L9FT HI MOIST COND ADH HYDRGEL CORDED

## (undated) DEVICE — DOUBLE BASIN SET: Brand: MEDLINE INDUSTRIES, INC.

## (undated) DEVICE — CANNULA IV 18GA L15IN BLNT FILL LUERLOCK HUB MJCT

## (undated) DEVICE — GOWN,SIRUS,NONRNF,SETINSLV,XL,20/CS: Brand: MEDLINE

## (undated) DEVICE — GAUZE,SPONGE,4"X4",16PLY,XRAY,STRL,LF: Brand: MEDLINE

## (undated) DEVICE — NDL CNTR 40CT FM MAG: Brand: MEDLINE INDUSTRIES, INC.

## (undated) DEVICE — GLOVE ORANGE PI 8   MSG9080

## (undated) DEVICE — SYRINGE MED 10ML TRNSLUC BRL PLUNG BLK MRK POLYPR CTRL